# Patient Record
Sex: MALE | Race: WHITE | NOT HISPANIC OR LATINO | Employment: FULL TIME | URBAN - METROPOLITAN AREA
[De-identification: names, ages, dates, MRNs, and addresses within clinical notes are randomized per-mention and may not be internally consistent; named-entity substitution may affect disease eponyms.]

---

## 2017-04-04 ENCOUNTER — HOSPITAL ENCOUNTER (OUTPATIENT)
Dept: RADIOLOGY | Facility: MEDICAL CENTER | Age: 57
End: 2017-04-04
Attending: ORTHOPAEDIC SURGERY
Payer: COMMERCIAL

## 2017-04-04 ENCOUNTER — HOSPITAL ENCOUNTER (OUTPATIENT)
Dept: RADIOLOGY | Facility: MEDICAL CENTER | Age: 57
End: 2017-04-04

## 2017-04-04 DIAGNOSIS — M25.551 RIGHT HIP PAIN: ICD-10-CM

## 2017-04-04 DIAGNOSIS — Z96.641 PRESENCE OF RIGHT ARTIFICIAL HIP JOINT: ICD-10-CM

## 2017-04-04 PROCEDURE — A9503 TC99M MEDRONATE: HCPCS

## 2018-06-24 ENCOUNTER — OFFICE VISIT (OUTPATIENT)
Dept: URGENT CARE | Facility: PHYSICIAN GROUP | Age: 58
End: 2018-06-24
Payer: COMMERCIAL

## 2018-06-24 VITALS
SYSTOLIC BLOOD PRESSURE: 112 MMHG | RESPIRATION RATE: 16 BRPM | TEMPERATURE: 99.4 F | HEIGHT: 74 IN | OXYGEN SATURATION: 98 % | DIASTOLIC BLOOD PRESSURE: 74 MMHG | WEIGHT: 230 LBS | HEART RATE: 79 BPM | BODY MASS INDEX: 29.52 KG/M2

## 2018-06-24 DIAGNOSIS — B96.89 ACUTE BACTERIAL SINUSITIS: ICD-10-CM

## 2018-06-24 DIAGNOSIS — J20.9 ACUTE BRONCHITIS, UNSPECIFIED ORGANISM: ICD-10-CM

## 2018-06-24 DIAGNOSIS — J01.90 ACUTE BACTERIAL SINUSITIS: ICD-10-CM

## 2018-06-24 PROCEDURE — 99203 OFFICE O/P NEW LOW 30 MIN: CPT | Performed by: NURSE PRACTITIONER

## 2018-06-24 RX ORDER — DOXYCYCLINE HYCLATE 100 MG/1
100 CAPSULE ORAL 2 TIMES DAILY
Qty: 14 CAP | Refills: 0 | Status: SHIPPED | OUTPATIENT
Start: 2018-06-24 | End: 2018-07-01

## 2018-06-24 ASSESSMENT — ENCOUNTER SYMPTOMS
SPUTUM PRODUCTION: 1
CHILLS: 0
BACK PAIN: 0
HEMOPTYSIS: 0
SHORTNESS OF BREATH: 0
DIARRHEA: 0
COUGH: 1
MYALGIAS: 0
ORTHOPNEA: 0
NAUSEA: 0
FEVER: 0
WHEEZING: 0
EYE DISCHARGE: 0
SORE THROAT: 0
HEADACHES: 0

## 2018-06-24 NOTE — PROGRESS NOTES
Subjective:      Samir Godinez is a 58 y.o. male who presents with Cough (C/o cough, chest/nasal congestion, sore throat, intermittent body aches, labored breathing x2 wks.  Sore throat feels more esophageal pain)            HPI New problem. 58 year old male with cough and congestion for 2 weeks. Denies fever, chills, myalgia, headache. He denies chest pain or back pain, or hemoptysis. He does states chest feels tight with wheezing, worse at night. No history of asthma, never smoker. Taking OTC cough and cold for this.  Patient has no known allergies.  Current Outpatient Prescriptions on File Prior to Visit   Medication Sig Dispense Refill   • IBUPROFEN PO Take  by mouth.     • TRAMADOL HCL PO Take  by mouth.       No current facility-administered medications on file prior to visit.      Social History     Social History   • Marital status: Single     Spouse name: N/A   • Number of children: N/A   • Years of education: N/A     Occupational History   • Not on file.     Social History Main Topics   • Smoking status: Never Smoker   • Smokeless tobacco: Never Used   • Alcohol use No   • Drug use: No   • Sexual activity: Not on file     Other Topics Concern   • Not on file     Social History Narrative    ** Merged History Encounter **          family history is not on file.      Review of Systems   Constitutional: Positive for malaise/fatigue. Negative for chills and fever.   HENT: Positive for congestion. Negative for sore throat.    Eyes: Negative for discharge.   Respiratory: Positive for cough and sputum production. Negative for hemoptysis, shortness of breath and wheezing.    Cardiovascular: Negative for chest pain and orthopnea.   Gastrointestinal: Negative for diarrhea and nausea.   Musculoskeletal: Negative for back pain and myalgias.   Neurological: Negative for headaches.   Endo/Heme/Allergies: Negative for environmental allergies.          Objective:     /74   Pulse 79   Temp 37.4 °C (99.4 °F)    "Resp 16   Ht 1.88 m (6' 2\")   Wt 104.3 kg (230 lb)   SpO2 98%   BMI 29.53 kg/m²      Physical Exam   Constitutional: He is oriented to person, place, and time. He appears well-developed and well-nourished. No distress.   HENT:   Head: Normocephalic and atraumatic.   Right Ear: External ear and ear canal normal. Tympanic membrane is not injected and not perforated. No middle ear effusion.   Left Ear: External ear and ear canal normal. Tympanic membrane is not injected and not perforated.  No middle ear effusion.   Nose: Mucosal edema present.   Mouth/Throat: Posterior oropharyngeal erythema present. No oropharyngeal exudate.   Eyes: Conjunctivae are normal. Right eye exhibits no discharge. Left eye exhibits no discharge.   Neck: Normal range of motion. Neck supple.   Cardiovascular: Normal rate, regular rhythm and normal heart sounds.    No murmur heard.  Pulmonary/Chest: Effort normal and breath sounds normal. No respiratory distress. He has no wheezes.   Musculoskeletal: Normal range of motion.   Normal movement of all 4 extremities.   Lymphadenopathy:     He has no cervical adenopathy.        Right: No supraclavicular adenopathy present.        Left: No supraclavicular adenopathy present.   Neurological: He is alert and oriented to person, place, and time. Gait normal.   Skin: Skin is warm and dry.   Psychiatric: He has a normal mood and affect. His behavior is normal. Thought content normal.   Nursing note and vitals reviewed.              Assessment/Plan:     1. Acute bacterial sinusitis  doxycycline (VIBRAMYCIN) 100 MG Cap   2. Acute bronchitis, unspecified organism       Doxy.  Differential diagnosis, natural history, supportive care, and indications for immediate follow-up discussed at length.       "

## 2018-09-17 ENCOUNTER — APPOINTMENT (OUTPATIENT)
Dept: LAB | Facility: MEDICAL CENTER | Age: 58
End: 2018-09-17
Attending: FAMILY MEDICINE
Payer: COMMERCIAL

## 2018-10-23 ENCOUNTER — HOSPITAL ENCOUNTER (OUTPATIENT)
Dept: LAB | Facility: MEDICAL CENTER | Age: 58
End: 2018-10-23
Attending: ORTHOPAEDIC SURGERY
Payer: COMMERCIAL

## 2018-10-23 ENCOUNTER — HOSPITAL ENCOUNTER (OUTPATIENT)
Dept: LAB | Facility: MEDICAL CENTER | Age: 58
End: 2018-10-23
Attending: FAMILY MEDICINE
Payer: COMMERCIAL

## 2018-10-23 LAB
ALBUMIN SERPL BCP-MCNC: 3.9 G/DL (ref 3.2–4.9)
ALBUMIN/GLOB SERPL: 1.4 G/DL
ALP SERPL-CCNC: 78 U/L (ref 30–99)
ALT SERPL-CCNC: 34 U/L (ref 2–50)
ANION GAP SERPL CALC-SCNC: 7 MMOL/L (ref 0–11.9)
AST SERPL-CCNC: 34 U/L (ref 12–45)
BILIRUB SERPL-MCNC: 0.6 MG/DL (ref 0.1–1.5)
BUN SERPL-MCNC: 21 MG/DL (ref 8–22)
CALCIUM SERPL-MCNC: 9.7 MG/DL (ref 8.5–10.5)
CHLORIDE SERPL-SCNC: 103 MMOL/L (ref 96–112)
CHOLEST SERPL-MCNC: 184 MG/DL (ref 100–199)
CO2 SERPL-SCNC: 27 MMOL/L (ref 20–33)
CREAT SERPL-MCNC: 0.97 MG/DL (ref 0.5–1.4)
CRP SERPL HS-MCNC: 0.62 MG/DL (ref 0–0.75)
ERYTHROCYTE [DISTWIDTH] IN BLOOD BY AUTOMATED COUNT: 41.7 FL (ref 35.9–50)
ERYTHROCYTE [SEDIMENTATION RATE] IN BLOOD BY WESTERGREN METHOD: 16 MM/HOUR (ref 0–20)
FASTING STATUS PATIENT QL REPORTED: NORMAL
GLOBULIN SER CALC-MCNC: 2.8 G/DL (ref 1.9–3.5)
GLUCOSE SERPL-MCNC: 98 MG/DL (ref 65–99)
HCT VFR BLD AUTO: 43.3 % (ref 42–52)
HDLC SERPL-MCNC: 56 MG/DL
HGB BLD-MCNC: 14.5 G/DL (ref 14–18)
LDLC SERPL CALC-MCNC: 116 MG/DL
MCH RBC QN AUTO: 29.6 PG (ref 27–33)
MCHC RBC AUTO-ENTMCNC: 33.5 G/DL (ref 33.7–35.3)
MCV RBC AUTO: 88.4 FL (ref 81.4–97.8)
PLATELET # BLD AUTO: 234 K/UL (ref 164–446)
PMV BLD AUTO: 9.3 FL (ref 9–12.9)
POTASSIUM SERPL-SCNC: 4.1 MMOL/L (ref 3.6–5.5)
PROT SERPL-MCNC: 6.7 G/DL (ref 6–8.2)
PSA SERPL-MCNC: 1.75 NG/ML (ref 0–4)
RBC # BLD AUTO: 4.9 M/UL (ref 4.7–6.1)
SODIUM SERPL-SCNC: 137 MMOL/L (ref 135–145)
TRIGL SERPL-MCNC: 60 MG/DL (ref 0–149)
WBC # BLD AUTO: 6.1 K/UL (ref 4.8–10.8)

## 2018-10-23 PROCEDURE — 80061 LIPID PANEL: CPT

## 2018-10-23 PROCEDURE — 85652 RBC SED RATE AUTOMATED: CPT

## 2018-10-23 PROCEDURE — 85027 COMPLETE CBC AUTOMATED: CPT

## 2018-10-23 PROCEDURE — 36415 COLL VENOUS BLD VENIPUNCTURE: CPT

## 2018-10-23 PROCEDURE — 86140 C-REACTIVE PROTEIN: CPT

## 2018-10-23 PROCEDURE — 84153 ASSAY OF PSA TOTAL: CPT

## 2018-10-23 PROCEDURE — 80053 COMPREHEN METABOLIC PANEL: CPT

## 2018-12-19 ENCOUNTER — HOSPITAL ENCOUNTER (OUTPATIENT)
Dept: LAB | Facility: MEDICAL CENTER | Age: 58
End: 2018-12-19
Attending: PHYSICIAN ASSISTANT
Payer: COMMERCIAL

## 2018-12-19 LAB
ALBUMIN SERPL BCP-MCNC: 4.1 G/DL (ref 3.2–4.9)
ALBUMIN/GLOB SERPL: 1.6 G/DL
ALP SERPL-CCNC: 68 U/L (ref 30–99)
ALT SERPL-CCNC: 73 U/L (ref 2–50)
ANION GAP SERPL CALC-SCNC: 7 MMOL/L (ref 0–11.9)
AST SERPL-CCNC: 50 U/L (ref 12–45)
BILIRUB SERPL-MCNC: 0.6 MG/DL (ref 0.1–1.5)
BUN SERPL-MCNC: 15 MG/DL (ref 8–22)
CALCIUM SERPL-MCNC: 9.7 MG/DL (ref 8.5–10.5)
CHLORIDE SERPL-SCNC: 104 MMOL/L (ref 96–112)
CO2 SERPL-SCNC: 25 MMOL/L (ref 20–33)
CREAT SERPL-MCNC: 0.96 MG/DL (ref 0.5–1.4)
FASTING STATUS PATIENT QL REPORTED: NORMAL
GLOBULIN SER CALC-MCNC: 2.6 G/DL (ref 1.9–3.5)
GLUCOSE SERPL-MCNC: 136 MG/DL (ref 65–99)
POTASSIUM SERPL-SCNC: 3.7 MMOL/L (ref 3.6–5.5)
PROT SERPL-MCNC: 6.7 G/DL (ref 6–8.2)
SODIUM SERPL-SCNC: 136 MMOL/L (ref 135–145)

## 2018-12-19 PROCEDURE — 80053 COMPREHEN METABOLIC PANEL: CPT

## 2018-12-19 PROCEDURE — 36415 COLL VENOUS BLD VENIPUNCTURE: CPT

## 2019-09-12 DIAGNOSIS — Z01.812 PRE-OPERATIVE LABORATORY EXAMINATION: ICD-10-CM

## 2019-09-12 LAB
ANION GAP SERPL CALC-SCNC: 8 MMOL/L (ref 0–11.9)
BUN SERPL-MCNC: 17 MG/DL (ref 8–22)
CALCIUM SERPL-MCNC: 9.5 MG/DL (ref 8.5–10.5)
CHLORIDE SERPL-SCNC: 104 MMOL/L (ref 96–112)
CO2 SERPL-SCNC: 26 MMOL/L (ref 20–33)
CREAT SERPL-MCNC: 0.95 MG/DL (ref 0.5–1.4)
ERYTHROCYTE [DISTWIDTH] IN BLOOD BY AUTOMATED COUNT: 42.4 FL (ref 35.9–50)
GLUCOSE SERPL-MCNC: 104 MG/DL (ref 65–99)
HCT VFR BLD AUTO: 43.3 % (ref 42–52)
HGB BLD-MCNC: 14.5 G/DL (ref 14–18)
MCH RBC QN AUTO: 30 PG (ref 27–33)
MCHC RBC AUTO-ENTMCNC: 33.5 G/DL (ref 33.7–35.3)
MCV RBC AUTO: 89.5 FL (ref 81.4–97.8)
PLATELET # BLD AUTO: 194 K/UL (ref 164–446)
PMV BLD AUTO: 9.6 FL (ref 9–12.9)
POTASSIUM SERPL-SCNC: 4.1 MMOL/L (ref 3.6–5.5)
RBC # BLD AUTO: 4.84 M/UL (ref 4.7–6.1)
SCCMEC + MECA PNL NOSE NAA+PROBE: NEGATIVE
SCCMEC + MECA PNL NOSE NAA+PROBE: NEGATIVE
SODIUM SERPL-SCNC: 138 MMOL/L (ref 135–145)
WBC # BLD AUTO: 6.5 K/UL (ref 4.8–10.8)

## 2019-09-12 PROCEDURE — 85027 COMPLETE CBC AUTOMATED: CPT

## 2019-09-12 PROCEDURE — 80048 BASIC METABOLIC PNL TOTAL CA: CPT

## 2019-09-12 PROCEDURE — 87641 MR-STAPH DNA AMP PROBE: CPT

## 2019-09-12 PROCEDURE — 36415 COLL VENOUS BLD VENIPUNCTURE: CPT

## 2019-09-12 PROCEDURE — 87640 STAPH A DNA AMP PROBE: CPT

## 2019-09-12 RX ORDER — ALPRAZOLAM 0.5 MG/1
0.5 TABLET ORAL NIGHTLY PRN
Status: ON HOLD | COMMUNITY
End: 2019-09-25

## 2019-09-25 ENCOUNTER — APPOINTMENT (OUTPATIENT)
Dept: RADIOLOGY | Facility: MEDICAL CENTER | Age: 59
DRG: 483 | End: 2019-09-25
Attending: SURGERY
Payer: COMMERCIAL

## 2019-09-25 ENCOUNTER — ANESTHESIA EVENT (OUTPATIENT)
Dept: SURGERY | Facility: MEDICAL CENTER | Age: 59
DRG: 483 | End: 2019-09-25
Payer: COMMERCIAL

## 2019-09-25 ENCOUNTER — ANESTHESIA (OUTPATIENT)
Dept: SURGERY | Facility: MEDICAL CENTER | Age: 59
DRG: 483 | End: 2019-09-25
Payer: COMMERCIAL

## 2019-09-25 ENCOUNTER — HOSPITAL ENCOUNTER (INPATIENT)
Facility: MEDICAL CENTER | Age: 59
LOS: 2 days | DRG: 483 | End: 2019-09-27
Attending: SURGERY | Admitting: SURGERY
Payer: COMMERCIAL

## 2019-09-25 DIAGNOSIS — G89.18 ACUTE POST-OPERATIVE PAIN: ICD-10-CM

## 2019-09-25 DIAGNOSIS — Z96.612 STATUS POST TOTAL SHOULDER REPLACEMENT, LEFT: ICD-10-CM

## 2019-09-25 PROCEDURE — 160036 HCHG PACU - EA ADDL 30 MINS PHASE I: Performed by: SURGERY

## 2019-09-25 PROCEDURE — 160022 HCHG BLOCK: Performed by: SURGERY

## 2019-09-25 PROCEDURE — 160002 HCHG RECOVERY MINUTES (STAT): Performed by: SURGERY

## 2019-09-25 PROCEDURE — 700111 HCHG RX REV CODE 636 W/ 250 OVERRIDE (IP)

## 2019-09-25 PROCEDURE — 502578 HCHG PACK, TOTAL HIP: Performed by: SURGERY

## 2019-09-25 PROCEDURE — A9270 NON-COVERED ITEM OR SERVICE: HCPCS | Performed by: ANESTHESIOLOGY

## 2019-09-25 PROCEDURE — 501838 HCHG SUTURE GENERAL: Performed by: SURGERY

## 2019-09-25 PROCEDURE — 73020 X-RAY EXAM OF SHOULDER: CPT | Mod: LT

## 2019-09-25 PROCEDURE — 700101 HCHG RX REV CODE 250: Performed by: ANESTHESIOLOGY

## 2019-09-25 PROCEDURE — A6223 GAUZE >16<=48 NO W/SAL W/O B: HCPCS | Performed by: SURGERY

## 2019-09-25 PROCEDURE — A4565 SLINGS: HCPCS | Performed by: SURGERY

## 2019-09-25 PROCEDURE — 700102 HCHG RX REV CODE 250 W/ 637 OVERRIDE(OP): Performed by: ANESTHESIOLOGY

## 2019-09-25 PROCEDURE — 700111 HCHG RX REV CODE 636 W/ 250 OVERRIDE (IP): Performed by: ANESTHESIOLOGY

## 2019-09-25 PROCEDURE — 700111 HCHG RX REV CODE 636 W/ 250 OVERRIDE (IP): Performed by: SURGERY

## 2019-09-25 PROCEDURE — 700102 HCHG RX REV CODE 250 W/ 637 OVERRIDE(OP): Performed by: SURGERY

## 2019-09-25 PROCEDURE — 160009 HCHG ANES TIME/MIN: Performed by: SURGERY

## 2019-09-25 PROCEDURE — 160041 HCHG SURGERY MINUTES - EA ADDL 1 MIN LEVEL 4: Performed by: SURGERY

## 2019-09-25 PROCEDURE — A9270 NON-COVERED ITEM OR SERVICE: HCPCS | Performed by: SURGERY

## 2019-09-25 PROCEDURE — 160029 HCHG SURGERY MINUTES - 1ST 30 MINS LEVEL 4: Performed by: SURGERY

## 2019-09-25 PROCEDURE — 160048 HCHG OR STATISTICAL LEVEL 1-5: Performed by: SURGERY

## 2019-09-25 PROCEDURE — 700105 HCHG RX REV CODE 258: Performed by: SURGERY

## 2019-09-25 PROCEDURE — 3E0T3BZ INTRODUCTION OF ANESTHETIC AGENT INTO PERIPHERAL NERVES AND PLEXI, PERCUTANEOUS APPROACH: ICD-10-PCS | Performed by: ANESTHESIOLOGY

## 2019-09-25 PROCEDURE — 0LS40ZZ REPOSITION LEFT UPPER ARM TENDON, OPEN APPROACH: ICD-10-PCS | Performed by: SURGERY

## 2019-09-25 PROCEDURE — 500367 HCHG DRAIN KIT, HEMOVAC: Performed by: SURGERY

## 2019-09-25 PROCEDURE — 160035 HCHG PACU - 1ST 60 MINS PHASE I: Performed by: SURGERY

## 2019-09-25 PROCEDURE — 0RRK0JZ REPLACEMENT OF LEFT SHOULDER JOINT WITH SYNTHETIC SUBSTITUTE, OPEN APPROACH: ICD-10-PCS | Performed by: SURGERY

## 2019-09-25 PROCEDURE — L8699 PROSTHETIC IMPLANT NOS: HCPCS | Performed by: SURGERY

## 2019-09-25 PROCEDURE — 770001 HCHG ROOM/CARE - MED/SURG/GYN PRIV*

## 2019-09-25 PROCEDURE — 502000 HCHG MISC OR IMPLANTS RC 0278: Performed by: SURGERY

## 2019-09-25 PROCEDURE — 700105 HCHG RX REV CODE 258

## 2019-09-25 DEVICE — IMPLANTABLE DEVICE: Type: IMPLANTABLE DEVICE | Site: SHOULDER | Status: FUNCTIONAL

## 2019-09-25 DEVICE — BONE CEMENT SIMPLEX FULL DOSE - (10EA/PK): Type: IMPLANTABLE DEVICE | Site: SHOULDER | Status: FUNCTIONAL

## 2019-09-25 RX ORDER — VANCOMYCIN HYDROCHLORIDE 1 G/20ML
INJECTION, POWDER, LYOPHILIZED, FOR SOLUTION INTRAVENOUS
Status: COMPLETED | OUTPATIENT
Start: 2019-09-25 | End: 2019-09-25

## 2019-09-25 RX ORDER — SODIUM CHLORIDE 9 MG/ML
INJECTION, SOLUTION INTRAVENOUS
Status: COMPLETED
Start: 2019-09-25 | End: 2019-09-25

## 2019-09-25 RX ORDER — ENEMA 19; 7 G/133ML; G/133ML
1 ENEMA RECTAL
Status: DISCONTINUED | OUTPATIENT
Start: 2019-09-25 | End: 2019-09-27 | Stop reason: HOSPADM

## 2019-09-25 RX ORDER — HYDROMORPHONE HYDROCHLORIDE 1 MG/ML
0.2 INJECTION, SOLUTION INTRAMUSCULAR; INTRAVENOUS; SUBCUTANEOUS
Status: DISCONTINUED | OUTPATIENT
Start: 2019-09-25 | End: 2019-09-25 | Stop reason: HOSPADM

## 2019-09-25 RX ORDER — ONDANSETRON 2 MG/ML
4 INJECTION INTRAMUSCULAR; INTRAVENOUS
Status: DISCONTINUED | OUTPATIENT
Start: 2019-09-25 | End: 2019-09-25 | Stop reason: HOSPADM

## 2019-09-25 RX ORDER — ONDANSETRON 2 MG/ML
4 INJECTION INTRAMUSCULAR; INTRAVENOUS EVERY 4 HOURS PRN
Status: DISCONTINUED | OUTPATIENT
Start: 2019-09-25 | End: 2019-09-27 | Stop reason: HOSPADM

## 2019-09-25 RX ORDER — OXYCODONE HYDROCHLORIDE 10 MG/1
10 TABLET ORAL
Status: DISCONTINUED | OUTPATIENT
Start: 2019-09-25 | End: 2019-09-26

## 2019-09-25 RX ORDER — HYDROMORPHONE HYDROCHLORIDE 1 MG/ML
0.1 INJECTION, SOLUTION INTRAMUSCULAR; INTRAVENOUS; SUBCUTANEOUS
Status: DISCONTINUED | OUTPATIENT
Start: 2019-09-25 | End: 2019-09-25 | Stop reason: HOSPADM

## 2019-09-25 RX ORDER — MIDAZOLAM HYDROCHLORIDE 1 MG/ML
0.5 INJECTION INTRAMUSCULAR; INTRAVENOUS
Status: DISCONTINUED | OUTPATIENT
Start: 2019-09-25 | End: 2019-09-25 | Stop reason: HOSPADM

## 2019-09-25 RX ORDER — HYDROMORPHONE HYDROCHLORIDE 1 MG/ML
0.4 INJECTION, SOLUTION INTRAMUSCULAR; INTRAVENOUS; SUBCUTANEOUS
Status: DISCONTINUED | OUTPATIENT
Start: 2019-09-25 | End: 2019-09-25 | Stop reason: HOSPADM

## 2019-09-25 RX ORDER — CELECOXIB 200 MG/1
200 CAPSULE ORAL 2 TIMES DAILY
Status: DISCONTINUED | OUTPATIENT
Start: 2019-09-25 | End: 2019-09-27 | Stop reason: HOSPADM

## 2019-09-25 RX ORDER — ONDANSETRON 2 MG/ML
INJECTION INTRAMUSCULAR; INTRAVENOUS PRN
Status: DISCONTINUED | OUTPATIENT
Start: 2019-09-25 | End: 2019-09-25 | Stop reason: SURG

## 2019-09-25 RX ORDER — DOCUSATE SODIUM 100 MG/1
100 CAPSULE, LIQUID FILLED ORAL 2 TIMES DAILY
Status: DISCONTINUED | OUTPATIENT
Start: 2019-09-25 | End: 2019-09-27 | Stop reason: HOSPADM

## 2019-09-25 RX ORDER — SCOLOPAMINE TRANSDERMAL SYSTEM 1 MG/1
1 PATCH, EXTENDED RELEASE TRANSDERMAL
Status: DISCONTINUED | OUTPATIENT
Start: 2019-09-25 | End: 2019-09-27 | Stop reason: HOSPADM

## 2019-09-25 RX ORDER — AMOXICILLIN 250 MG
1 CAPSULE ORAL
Status: DISCONTINUED | OUTPATIENT
Start: 2019-09-25 | End: 2019-09-27 | Stop reason: HOSPADM

## 2019-09-25 RX ORDER — SODIUM CHLORIDE, SODIUM LACTATE, POTASSIUM CHLORIDE, CALCIUM CHLORIDE 600; 310; 30; 20 MG/100ML; MG/100ML; MG/100ML; MG/100ML
INJECTION, SOLUTION INTRAVENOUS CONTINUOUS
Status: DISCONTINUED | OUTPATIENT
Start: 2019-09-25 | End: 2019-09-25 | Stop reason: HOSPADM

## 2019-09-25 RX ORDER — CEFAZOLIN SODIUM 1 G/3ML
INJECTION, POWDER, FOR SOLUTION INTRAMUSCULAR; INTRAVENOUS PRN
Status: DISCONTINUED | OUTPATIENT
Start: 2019-09-25 | End: 2019-09-25 | Stop reason: SURG

## 2019-09-25 RX ORDER — SODIUM CHLORIDE, SODIUM LACTATE, POTASSIUM CHLORIDE, CALCIUM CHLORIDE 600; 310; 30; 20 MG/100ML; MG/100ML; MG/100ML; MG/100ML
INJECTION, SOLUTION INTRAVENOUS CONTINUOUS
Status: ACTIVE | OUTPATIENT
Start: 2019-09-25 | End: 2019-09-26

## 2019-09-25 RX ORDER — OXYCODONE HYDROCHLORIDE 5 MG/1
5 TABLET ORAL
Status: DISCONTINUED | OUTPATIENT
Start: 2019-09-25 | End: 2019-09-26

## 2019-09-25 RX ORDER — DEXAMETHASONE SODIUM PHOSPHATE 4 MG/ML
4 INJECTION, SOLUTION INTRA-ARTICULAR; INTRALESIONAL; INTRAMUSCULAR; INTRAVENOUS; SOFT TISSUE
Status: DISCONTINUED | OUTPATIENT
Start: 2019-09-25 | End: 2019-09-27 | Stop reason: HOSPADM

## 2019-09-25 RX ORDER — ACETAMINOPHEN 500 MG
1000 TABLET ORAL EVERY 6 HOURS
Status: DISCONTINUED | OUTPATIENT
Start: 2019-09-25 | End: 2019-09-27 | Stop reason: HOSPADM

## 2019-09-25 RX ORDER — POLYETHYLENE GLYCOL 3350 17 G/17G
1 POWDER, FOR SOLUTION ORAL 2 TIMES DAILY PRN
Status: DISCONTINUED | OUTPATIENT
Start: 2019-09-25 | End: 2019-09-27 | Stop reason: HOSPADM

## 2019-09-25 RX ORDER — DIPHENHYDRAMINE HYDROCHLORIDE 50 MG/ML
25 INJECTION INTRAMUSCULAR; INTRAVENOUS EVERY 6 HOURS PRN
Status: DISCONTINUED | OUTPATIENT
Start: 2019-09-25 | End: 2019-09-27 | Stop reason: HOSPADM

## 2019-09-25 RX ORDER — ROPIVACAINE HYDROCHLORIDE 5 MG/ML
INJECTION, SOLUTION EPIDURAL; INFILTRATION; PERINEURAL PRN
Status: DISCONTINUED | OUTPATIENT
Start: 2019-09-25 | End: 2019-09-25 | Stop reason: SURG

## 2019-09-25 RX ORDER — AMOXICILLIN 250 MG
1 CAPSULE ORAL NIGHTLY
Status: DISCONTINUED | OUTPATIENT
Start: 2019-09-25 | End: 2019-09-27 | Stop reason: HOSPADM

## 2019-09-25 RX ORDER — CEFAZOLIN SODIUM 2 G/100ML
2 INJECTION, SOLUTION INTRAVENOUS EVERY 8 HOURS
Status: COMPLETED | OUTPATIENT
Start: 2019-09-25 | End: 2019-09-26

## 2019-09-25 RX ORDER — TRANEXAMIC ACID 100 MG/ML
INJECTION, SOLUTION INTRAVENOUS PRN
Status: DISCONTINUED | OUTPATIENT
Start: 2019-09-25 | End: 2019-09-25 | Stop reason: SURG

## 2019-09-25 RX ORDER — BISACODYL 10 MG
10 SUPPOSITORY, RECTAL RECTAL
Status: DISCONTINUED | OUTPATIENT
Start: 2019-09-25 | End: 2019-09-27 | Stop reason: HOSPADM

## 2019-09-25 RX ORDER — MEPERIDINE HYDROCHLORIDE 25 MG/ML
25 INJECTION INTRAMUSCULAR; INTRAVENOUS; SUBCUTANEOUS
Status: DISCONTINUED | OUTPATIENT
Start: 2019-09-25 | End: 2019-09-25 | Stop reason: HOSPADM

## 2019-09-25 RX ORDER — OXYCODONE HCL 5 MG/5 ML
5 SOLUTION, ORAL ORAL
Status: COMPLETED | OUTPATIENT
Start: 2019-09-25 | End: 2019-09-25

## 2019-09-25 RX ORDER — DEXAMETHASONE SODIUM PHOSPHATE 4 MG/ML
INJECTION, SOLUTION INTRA-ARTICULAR; INTRALESIONAL; INTRAMUSCULAR; INTRAVENOUS; SOFT TISSUE PRN
Status: DISCONTINUED | OUTPATIENT
Start: 2019-09-25 | End: 2019-09-25 | Stop reason: SURG

## 2019-09-25 RX ORDER — OXYCODONE HCL 5 MG/5 ML
10 SOLUTION, ORAL ORAL
Status: COMPLETED | OUTPATIENT
Start: 2019-09-25 | End: 2019-09-25

## 2019-09-25 RX ORDER — LIDOCAINE HYDROCHLORIDE 10 MG/ML
INJECTION, SOLUTION EPIDURAL; INFILTRATION; INTRACAUDAL; PERINEURAL
Status: COMPLETED
Start: 2019-09-25 | End: 2019-09-25

## 2019-09-25 RX ORDER — HALOPERIDOL 5 MG/ML
1 INJECTION INTRAMUSCULAR
Status: DISCONTINUED | OUTPATIENT
Start: 2019-09-25 | End: 2019-09-25 | Stop reason: HOSPADM

## 2019-09-25 RX ORDER — HALOPERIDOL 5 MG/ML
1 INJECTION INTRAMUSCULAR EVERY 6 HOURS PRN
Status: DISCONTINUED | OUTPATIENT
Start: 2019-09-25 | End: 2019-09-27 | Stop reason: HOSPADM

## 2019-09-25 RX ORDER — LIDOCAINE HYDROCHLORIDE 40 MG/ML
SOLUTION TOPICAL PRN
Status: DISCONTINUED | OUTPATIENT
Start: 2019-09-25 | End: 2019-09-25 | Stop reason: SURG

## 2019-09-25 RX ADMIN — FENTANYL CITRATE 100 MCG: 50 INJECTION, SOLUTION INTRAMUSCULAR; INTRAVENOUS at 15:06

## 2019-09-25 RX ADMIN — VANCOMYCIN HYDROCHLORIDE 2 G: 1 INJECTION, POWDER, LYOPHILIZED, FOR SOLUTION INTRAVENOUS at 15:20

## 2019-09-25 RX ADMIN — SODIUM CHLORIDE: 9 INJECTION, SOLUTION INTRAVENOUS at 23:30

## 2019-09-25 RX ADMIN — SENNOSIDES, DOCUSATE SODIUM 1 TABLET: 50; 8.6 TABLET, FILM COATED ORAL at 21:35

## 2019-09-25 RX ADMIN — CEFAZOLIN SODIUM 2 G: 2 INJECTION, SOLUTION INTRAVENOUS at 23:32

## 2019-09-25 RX ADMIN — MIDAZOLAM HYDROCHLORIDE 1 MG: 1 INJECTION, SOLUTION INTRAMUSCULAR; INTRAVENOUS at 14:55

## 2019-09-25 RX ADMIN — SUCCINYLCHOLINE CHLORIDE 100 MG: 20 INJECTION, SOLUTION INTRAMUSCULAR; INTRAVENOUS at 15:08

## 2019-09-25 RX ADMIN — DEXAMETHASONE SODIUM PHOSPHATE 8 MG: 4 INJECTION, SOLUTION INTRA-ARTICULAR; INTRALESIONAL; INTRAMUSCULAR; INTRAVENOUS; SOFT TISSUE at 15:20

## 2019-09-25 RX ADMIN — OXYCODONE HYDROCHLORIDE 10 MG: 5 SOLUTION ORAL at 17:30

## 2019-09-25 RX ADMIN — EPHEDRINE SULFATE 50 MG: 50 INJECTION INTRAMUSCULAR; INTRAVENOUS; SUBCUTANEOUS at 15:30

## 2019-09-25 RX ADMIN — EPHEDRINE SULFATE 50 MG: 50 INJECTION INTRAMUSCULAR; INTRAVENOUS; SUBCUTANEOUS at 16:29

## 2019-09-25 RX ADMIN — ONDANSETRON 4 MG: 2 INJECTION INTRAMUSCULAR; INTRAVENOUS at 15:20

## 2019-09-25 RX ADMIN — LIDOCAINE HYDROCHLORIDE 0.5 ML: 10 INJECTION, SOLUTION EPIDURAL; INFILTRATION; INTRACAUDAL at 13:10

## 2019-09-25 RX ADMIN — FENTANYL CITRATE 50 MCG: 50 INJECTION INTRAMUSCULAR; INTRAVENOUS at 17:48

## 2019-09-25 RX ADMIN — SODIUM CHLORIDE, POTASSIUM CHLORIDE, SODIUM LACTATE AND CALCIUM CHLORIDE: 600; 310; 30; 20 INJECTION, SOLUTION INTRAVENOUS at 13:10

## 2019-09-25 RX ADMIN — OXYCODONE HYDROCHLORIDE 10 MG: 10 TABLET ORAL at 21:35

## 2019-09-25 RX ADMIN — Medication 0.5 ML: at 13:10

## 2019-09-25 RX ADMIN — TRANEXAMIC ACID 1000 MG: 100 INJECTION, SOLUTION INTRAVENOUS at 15:20

## 2019-09-25 RX ADMIN — LIDOCAINE HYDROCHLORIDE 4 ML: 40 SOLUTION TOPICAL at 15:09

## 2019-09-25 RX ADMIN — CELECOXIB 200 MG: 200 CAPSULE ORAL at 21:35

## 2019-09-25 RX ADMIN — FENTANYL CITRATE 50 MCG: 50 INJECTION, SOLUTION INTRAMUSCULAR; INTRAVENOUS at 14:55

## 2019-09-25 RX ADMIN — ROCURONIUM BROMIDE 10 MG: 10 INJECTION, SOLUTION INTRAVENOUS at 15:08

## 2019-09-25 RX ADMIN — ACETAMINOPHEN 1000 MG: 500 TABLET ORAL at 23:35

## 2019-09-25 RX ADMIN — ROPIVACAINE HYDROCHLORIDE 30 ML: 5 INJECTION, SOLUTION EPIDURAL; INFILTRATION; PERINEURAL at 15:05

## 2019-09-25 RX ADMIN — LIDOCAINE HYDROCHLORIDE 50 MG: 20 INJECTION, SOLUTION INTRAVENOUS at 15:08

## 2019-09-25 RX ADMIN — FENTANYL CITRATE 100 MCG: 50 INJECTION, SOLUTION INTRAMUSCULAR; INTRAVENOUS at 17:13

## 2019-09-25 RX ADMIN — CEFAZOLIN 2 G: 330 INJECTION, POWDER, FOR SOLUTION INTRAMUSCULAR; INTRAVENOUS at 15:20

## 2019-09-25 RX ADMIN — PROPOFOL 200 MG: 10 INJECTION, EMULSION INTRAVENOUS at 15:08

## 2019-09-25 RX ADMIN — FENTANYL CITRATE 50 MCG: 50 INJECTION INTRAMUSCULAR; INTRAVENOUS at 17:30

## 2019-09-25 ASSESSMENT — PAIN SCALES - GENERAL: PAIN_LEVEL: 0

## 2019-09-25 NOTE — PROGRESS NOTES
Med Rec Updated and Complete per Pt at bedside  Allergies Reviewed  No PO ABX last 14 days.    Pt denies RX or OTC medication at this time.

## 2019-09-25 NOTE — ANESTHESIA PREPROCEDURE EVALUATION
Relevant Problems   Other   (+) Synovitis of hip       Physical Exam    Airway   Mallampati: II  TM distance: >3 FB  Neck ROM: full       Cardiovascular - normal exam  Rhythm: regular  Rate: normal  (-) murmur     Dental - normal exam         Pulmonary - normal exam  Breath sounds clear to auscultation     Abdominal   (+) obese     Neurological - normal exam                 Anesthesia Plan    ASA 2       Plan - general and peripheral nerve block     Peripheral nerve block will be post-op pain control  Airway plan will be ETT        Induction: intravenous    Postoperative Plan: Postoperative administration of opioids is intended.    Pertinent diagnostic labs and testing reviewed    Informed Consent:    Anesthetic plan and risks discussed with patient.    Use of blood products discussed with: patient whom consented to blood products.

## 2019-09-25 NOTE — ANESTHESIA PROCEDURE NOTES
Peripheral Block  Date/Time: 9/25/2019 3:00 PM  Performed by: Dillon Nance M.D.  Authorized by: Dillon Nance M.D.     Patient Location:  OR  Start Time:  9/25/2019 3:00 PM  End Time:  9/25/2019 3:05 PM  Reason for Block: at surgeon's request, post-op pain management and procedure for pain    patient identified, IV checked, site marked, risks and benefits discussed, surgical consent, monitors and equipment checked, pre-op evaluation and timeout performed    Patient Position:  Supine  Prep: ChloraPrep    Monitoring:  Heart rate, continuous pulse ox and cardiac monitor  Block Region:  Upper Extremity  Upper Extremity - Block Type:  BRACHIAL PLEXUS block, Interscalene approach    Laterality:  Left  Procedures: ultrasound guided  Image captured, interpreted and electronically stored.  Local Infiltration:  Lidocaine  Strength:  2 %  Dose:  2 ml  Block Type:  Single-shot  Needle Length:  100mm  Needle Gauge:  21 G  Needle Localization:  Ultrasound guidance and anatomical landmarks  Injection Assessment:  Negative aspiration for heme, no paresthesia on injection, incremental injection and local visualized surrounding nerve on ultrasound  Evidence of intravascular injection: No

## 2019-09-25 NOTE — ANESTHESIA PROCEDURE NOTES
Airway  Date/Time: 9/25/2019 3:09 PM  Performed by: Dillon Nance M.D.  Authorized by: Dillon Nance M.D.     Location:  OR  Urgency:  Elective  Difficult Airway: No    Indications for Airway Management:  Anesthesia  Spontaneous Ventilation: absent    Sedation Level:  Deep  Preoxygenated: Yes    Patient Position:  Sniffing  Mask Difficulty Assessment:  0 - not attempted  Final Airway Type:  Endotracheal airway  Final Endotracheal Airway:  ETT  Cuffed: Yes    Technique Used for Successful ETT Placement:  Direct laryngoscopy  Insertion Site:  Oral  Blade Type:  Umberto  Laryngoscope Blade/Videolaryngoscope Blade Size:  4  ETT Size (mm):  6.5  Measured from:  Teeth  ETT to Teeth (cm):  21  Placement Verified by: auscultation and capnometry    Cormack-Lehane Classification:  Grade IIa - partial view of glottis  Number of Attempts at Approach:  1  Number of Other Approaches Attempted:  0

## 2019-09-26 PROCEDURE — A9270 NON-COVERED ITEM OR SERVICE: HCPCS | Performed by: SURGERY

## 2019-09-26 PROCEDURE — 700102 HCHG RX REV CODE 250 W/ 637 OVERRIDE(OP): Performed by: PHYSICIAN ASSISTANT

## 2019-09-26 PROCEDURE — 770001 HCHG ROOM/CARE - MED/SURG/GYN PRIV*

## 2019-09-26 PROCEDURE — 700112 HCHG RX REV CODE 229: Performed by: SURGERY

## 2019-09-26 PROCEDURE — A9270 NON-COVERED ITEM OR SERVICE: HCPCS | Performed by: PHYSICIAN ASSISTANT

## 2019-09-26 PROCEDURE — 700111 HCHG RX REV CODE 636 W/ 250 OVERRIDE (IP): Performed by: SURGERY

## 2019-09-26 PROCEDURE — 700102 HCHG RX REV CODE 250 W/ 637 OVERRIDE(OP): Performed by: SURGERY

## 2019-09-26 RX ORDER — OXYCODONE HYDROCHLORIDE 5 MG/1
5-15 TABLET ORAL
Status: DISCONTINUED | OUTPATIENT
Start: 2019-09-26 | End: 2019-09-27 | Stop reason: HOSPADM

## 2019-09-26 RX ADMIN — SENNOSIDES, DOCUSATE SODIUM 1 TABLET: 50; 8.6 TABLET, FILM COATED ORAL at 20:07

## 2019-09-26 RX ADMIN — OXYCODONE HYDROCHLORIDE 15 MG: 5 TABLET ORAL at 20:07

## 2019-09-26 RX ADMIN — ACETAMINOPHEN 1000 MG: 500 TABLET ORAL at 06:00

## 2019-09-26 RX ADMIN — CELECOXIB 200 MG: 200 CAPSULE ORAL at 17:31

## 2019-09-26 RX ADMIN — OXYCODONE HYDROCHLORIDE 10 MG: 10 TABLET ORAL at 12:45

## 2019-09-26 RX ADMIN — CELECOXIB 200 MG: 200 CAPSULE ORAL at 06:00

## 2019-09-26 RX ADMIN — DOCUSATE SODIUM 100 MG: 100 CAPSULE, LIQUID FILLED ORAL at 17:31

## 2019-09-26 RX ADMIN — ACETAMINOPHEN 1000 MG: 500 TABLET ORAL at 23:34

## 2019-09-26 RX ADMIN — ACETAMINOPHEN 1000 MG: 500 TABLET ORAL at 17:31

## 2019-09-26 RX ADMIN — CEFAZOLIN SODIUM 2 G: 2 INJECTION, SOLUTION INTRAVENOUS at 06:25

## 2019-09-26 RX ADMIN — OXYCODONE HYDROCHLORIDE 15 MG: 5 TABLET ORAL at 23:34

## 2019-09-26 RX ADMIN — ACETAMINOPHEN 1000 MG: 500 TABLET ORAL at 12:45

## 2019-09-26 RX ADMIN — OXYCODONE HYDROCHLORIDE 15 MG: 5 TABLET ORAL at 16:14

## 2019-09-26 RX ADMIN — OXYCODONE HYDROCHLORIDE 10 MG: 10 TABLET ORAL at 08:47

## 2019-09-26 ASSESSMENT — COGNITIVE AND FUNCTIONAL STATUS - GENERAL
TOILETING: A LOT
MOVING FROM LYING ON BACK TO SITTING ON SIDE OF FLAT BED: A LITTLE
WALKING IN HOSPITAL ROOM: A LITTLE
SUGGESTED CMS G CODE MODIFIER MOBILITY: CK
CLIMB 3 TO 5 STEPS WITH RAILING: A LITTLE
DAILY ACTIVITIY SCORE: 15
STANDING UP FROM CHAIR USING ARMS: A LITTLE
DRESSING REGULAR UPPER BODY CLOTHING: A LOT
MOVING TO AND FROM BED TO CHAIR: A LOT
TURNING FROM BACK TO SIDE WHILE IN FLAT BAD: A LOT
DRESSING REGULAR LOWER BODY CLOTHING: TOTAL
SUGGESTED CMS G CODE MODIFIER DAILY ACTIVITY: CK
MOBILITY SCORE: 16
HELP NEEDED FOR BATHING: A LOT

## 2019-09-26 ASSESSMENT — LIFESTYLE VARIABLES
CONSUMPTION TOTAL: NEGATIVE
ON A TYPICAL DAY WHEN YOU DRINK ALCOHOL HOW MANY DRINKS DO YOU HAVE: 0
TOTAL SCORE: 0
ALCOHOL_USE: NO
HOW MANY TIMES IN THE PAST YEAR HAVE YOU HAD 5 OR MORE DRINKS IN A DAY: 0
EVER HAD A DRINK FIRST THING IN THE MORNING TO STEADY YOUR NERVES TO GET RID OF A HANGOVER: NO
DOES PATIENT WANT TO STOP DRINKING: NO
HAVE YOU EVER FELT YOU SHOULD CUT DOWN ON YOUR DRINKING: NO
TOTAL SCORE: 0
EVER_SMOKED: NEVER
HAVE PEOPLE ANNOYED YOU BY CRITICIZING YOUR DRINKING: NO
TOTAL SCORE: 0
AVERAGE NUMBER OF DAYS PER WEEK YOU HAVE A DRINK CONTAINING ALCOHOL: 0
EVER FELT BAD OR GUILTY ABOUT YOUR DRINKING: NO

## 2019-09-26 ASSESSMENT — PATIENT HEALTH QUESTIONNAIRE - PHQ9
2. FEELING DOWN, DEPRESSED, IRRITABLE, OR HOPELESS: NOT AT ALL
1. LITTLE INTEREST OR PLEASURE IN DOING THINGS: NOT AT ALL
SUM OF ALL RESPONSES TO PHQ9 QUESTIONS 1 AND 2: 0

## 2019-09-26 NOTE — OP REPORT
DATE OF SERVICE:  09/25/2019    SURGEON:  Denzel Veliz MD    ASSISTANT:  Sean Reed PA-C    PREOPERATIVE DIAGNOSIS:  Left shoulder osteoarthritis.    POSTOPERATIVE DIAGNOSES:  1.  Left shoulder osteoarthritis.  2.  Left biceps tendinopathy.    PROCEDURES:  1.  Left anatomic total shoulder arthroplasty.  2.  Left open biceps tenodesis.    ANESTHESIOLOGIST:  Dillon Nance MD    ANESTHESIA:  General with upper extremity nerve block for pain control.    IMPLANTS:  Integra total shoulder arthroplasty system, size 12 pressfit stem,   standard body size 50x19 mm, eccentric head and a medium cemented   polyethylene.    COMPLICATIONS:  None noted.    DRAINS:  Hemovac x1.    SPECIMENS:  None.    ESTIMATED BLOOD LOSS:  150 mL.    INDICATIONS:  The patient is a 59-year-old gentleman well known to me through   my outpatient clinic for the above-mentioned diagnosis.  He believes and   agrees he has failed conservative treatment and is a candidate for the   above-mentioned procedure.  Prior to the procedure, he understood the risks,   benefits and alternatives to surgery.  He understood the risks to include, but   not limited to the risk of infection requiring repeat surgery, bleeding   requiring blood transfusion, nerve, blood vessel, tendon injury requiring   repair, chronic pain, periprosthetic fracture, subsidence, loosening or   dislocation, stiffness or dissatisfaction requiring revision surgery, DVT,   pulmonary embolism, heart attack, stroke and even death.  The patient states   despite these risks he wished to proceed with surgery.    DESCRIPTION OF PROCEDURE:  The patient was met in the preoperative holding   area.  His left shoulder was initialed as correct operative site.  He had an   opportunity to ask questions.  All questions were answered and informed   consent was obtained.  The patient was brought to the operating room and laid   supine on the operating table.  All bony and dependent prominences  were well   padded.  Upper extremity nerve block and general anesthesia were induced   without complication.  Ancef was administered for infection prophylaxis.  The   patient was placed in well-padded beachchair position.  Left upper extremity   was prepped and draped in the usual sterile fashion.  A formal timeout was   performed in which all parties agreed upon the correct patient, procedure and   operative site.  I began the procedure by while on the beachchair position   making an oblique incision in the anterior aspect of the shoulder for   deltopectoral approach.  I identified cephalic vein, retracted medially,   cauterized lateral branches, elevated the deltoid off the proximal humerus   where there was significant amount of adhesions, released the upper third   pectoralis major tendon to help mobilize the shoulder.  Deep to this, I   identified a flattened frayed and a very degenerative tendinopathic biceps   tendon to treat this pain and to get out of the prosthesis.  I performed an   open biceps tenodesis at resting tension, tenodesed into the upper half of   pectoralis major tendon with 0 Ethibond suture ____.  I then followed into the   rotator interval, released off the top of the glenoid while palpating and   protecting the axillary nerve and made a longitudinal tenotomy in   subscapularis tendon tagging the medial side for later repair.  I then   dislocated the shoulder, removed osteophytes and made an anatomic neck cut at   30 degrees retroversion, placed the cup protector, placed glenoid retractors   while palpating and protecting the axillary nerve and circumferentially   excised the labrum, removed osteophytes from the glenoid.  I then placed the   glenoid guide, reamed it just slightly preferentially anterior as he had very   slow just slight posterior wear.  Then, I drilled for the pegs for the glenoid   and cemented in a size medium polyethylene, which had excellent fixation and   good  bone-on-bone contact between the implant and the glenoid.  I turned my   attention back to the humerus, broached up to a size 12, which had excellent   pressfit, placed this with standard body, trialed multiple head.  The size   50x19 eccentric had the best coverage of the head with good range of motion   and 50% posterior translation.  I placed the final component, copiously   irrigated the joint, mobilized subscapularis tendon and repaired the tenotomy   with #5 Tycron suture.  I then copiously irrigated the wound with normal   saline, placed 1 deep Hemovac drain, closed the skin with 2-0 Monocryl suture,   skin stapler, all covered with Xeroform, 4x4s, Ioban and sling.  The patient   awoke from anesthesia without complication, was transferred in a stable   condition to the PACU where there were no immediate postoperative complaints.    POSTOPERATIVE PLAN:  Patient will be admitted overnight for pain control,   likely discharged home on postoperative day #1.       ____________________________________     MD RAHEEL Bull / CHRISTEN    DD:  09/25/2019 17:45:49  DT:  09/25/2019 21:49:59    D#:  [id number]  Job#:  [id number]

## 2019-09-26 NOTE — OR NURSING
Patient off unit with transport. Belongings bag on gurney with phone in bag. VSS. On room air. States all needs met at this time.

## 2019-09-26 NOTE — DISCHARGE INSTRUCTIONS
Discharge Instructions    Discharged to home by car with relative. Discharged via walking, hospital escort: Yes.  Special equipment needed: Sling    Be sure to schedule a follow-up appointment with your primary care doctor or any specialists as instructed.     Discharge Plan:   Influenza Vaccine Indication: Patient Refuses    I understand that a diet low in cholesterol, fat, and sodium is recommended for good health. Unless I have been given specific instructions below for another diet, I accept this instruction as my diet prescription.   Other diet: Regular    Special Instructions: Discharge instructions for the Orthopedic Patient    Follow up with Primary Care Physician within 2 weeks of discharge to home, regarding:  Review of medications and diagnostic testing.  Surveillance for medical complications.  Workup and treatment of osteoporosis, if appropriate.     -Is this a Joint Replacement patient? No    -Is this patient being discharged with medication to prevent blood clots?  No    · Is patient discharged on Warfarin / Coumadin?   No     Shoulder Joint Replacement, Care After  Refer to this sheet in the next few weeks. These instructions provide you with information on caring for yourself after your procedure. Your health care provider may also give you more specific instructions. Your treatment has been planned according to current medical practices, but problems sometimes occur. Call your health care provider if you have any problems or questions after your procedure.  WHAT TO EXPECT AFTER THE PROCEDURE  After your procedure, your arm and shoulder will typically be stiff and bruised. This will improve over time.  HOME CARE INSTRUCTIONS   · You may resume your normal diet and activities as directed by your surgeon.  · You should regain full use of your shoulder in 6 weeks.  · Your arm will be in a sling. You will need to wear this for 4-6 weeks after surgery.  · Wear the sling every night for at least the first  month, or as instructed by your surgeon.  · Do not use your arm to push yourself up in bed or from a chair. This requires too much muscle.  · Follow the program of home exercises suggested. Do the exercises 4-5 times a day for a month or as directed.  · Try not to overuse your shoulder. Overusing the shoulder is easy to do if this is the first time you have been pain free in a long time. Early overuse of the shoulder may result in later problems.  · Do not lift anything heavier than a cup of coffee for the first 6 weeks after surgery.  · Ask for help. Your health care provider may be able to suggest a clinic or agency for this if you do not have home support.  · Do not participate in contact sports or do any heavy lifting (more than 10 lb [4.5 kg]) for at least 6 months, or as directed.  · Apply ice to the injured area for the first 2 days after surgery:  ¨ Put ice in a plastic bag.  ¨ Place a towel between your skin and the bag.  ¨ Leave the ice on for 20 minutes, 2-3 times a day.  · Change dressings if necessary or as directed.  · Only take over-the-counter or prescription medicines for pain, discomfort, or fever as directed by your health care provider.  · Keep all follow-up appointments as directed.  SEEK MEDICAL CARE IF:  · You have redness, swelling, or increasing pain in the wound.  · You see pus coming from the wound.  · You have a fever.  · You notice a bad smell coming from the wound or dressing.  · The edges of the wound break open after sutures or staples have been removed.  · You have increasing pain with movement of the shoulder.  SEEK IMMEDIATE MEDICAL CARE IF:   · You develop a rash.  · You have chest pain or shortness of breath.  · You have any reaction or side effects to medicine given.  MAKE SURE YOU:  · Understand these instructions.  · Will watch your condition.  · Will get help right away if you are not doing well or get worse.     This information is not intended to replace advice given to you  by your health care provider. Make sure you discuss any questions you have with your health care provider.     Document Released: 07/07/2006 Document Revised: 12/23/2014 Document Reviewed: 07/17/2014  [employer] Interactive Patient Education ©2016 [employer]      Oxycodone tablets or capsules  What is this medicine?  OXYCODONE (ox i KOE done) is a pain reliever. It is used to treat moderate to severe pain.  This medicine may be used for other purposes; ask your health care provider or pharmacist if you have questions.  COMMON BRAND NAME(S): Dazidox, Endocodone, Oxaydo, OXECTA, OxyIR, Percolone, Roxicodone, ROXYBOND  What should I tell my health care provider before I take this medicine?  They need to know if you have any of these conditions:  -Coos's disease  -brain tumor  -head injury  -heart disease  -history of drug or alcohol abuse problem  -if you often drink alcohol  -kidney disease  -liver disease  -lung or breathing disease, like asthma  -mental illness  -pancreatic disease  -seizures  -thyroid disease  -an unusual or allergic reaction to oxycodone, codeine, hydrocodone, morphine, other medicines, foods, dyes, or preservatives  -pregnant or trying to get pregnant  -breast-feeding  How should I use this medicine?  Take this medicine by mouth with a glass of water. Follow the directions on the prescription label. You can take it with or without food. If it upsets your stomach, take it with food. Take your medicine at regular intervals. Do not take it more often than directed. Do not stop taking except on your doctor's advice.  Some brands of this medicine, like Oxecta, have special instructions. Ask your doctor or pharmacist if these directions are for you: Do not cut, crush or chew this medicine. Swallow only one tablet at a time. Do not wet, soak, or lick the tablet before you take it.  A special MedGuide will be given to you by the pharmacist with each prescription and refill. Be sure to read this  information carefully each time.  Talk to your pediatrician regarding the use of this medicine in children. Special care may be needed.  Overdosage: If you think you have taken too much of this medicine contact a poison control center or emergency room at once.  NOTE: This medicine is only for you. Do not share this medicine with others.  What if I miss a dose?  If you miss a dose, take it as soon as you can. If it is almost time for your next dose, take only that dose. Do not take double or extra doses.  What may interact with this medicine?  This medicine may interact with the following medications:  -alcohol  -antihistamines for allergy, cough and cold  -antiviral medicines for HIV or AIDS  -atropine  -certain antibiotics like clarithromycin, erythromycin, linezolid, rifampin  -certain medicines for anxiety or sleep  -certain medicines for bladder problems like oxybutynin, tolterodine  -certain medicines for depression like amitriptyline, fluoxetine, sertraline  -certain medicines for fungal infections like ketoconazole, itraconazole, voriconazole  -certain medicines for migraine headache like almotriptan, eletriptan, frovatriptan, naratriptan, rizatriptan, sumatriptan, zolmitriptan  -certain medicines for nausea or vomiting like dolasetron, ondansetron, palonosetron  -certain medicines for Parkinson's disease like benztropine, trihexyphenidyl  -certain medicines for seizures like phenobarbital, phenytoin, primidone  -certain medicines for stomach problems like dicyclomine, hyoscyamine  -certain medicines for travel sickness like scopolamine  -diuretics  -general anesthetics like halothane, isoflurane, methoxyflurane, propofol  -ipratropium  -local anesthetics like lidocaine, pramoxine, tetracaine  -MAOIs like Carbex, Eldepryl, Marplan, Nardil, and Parnate  -medicines that relax muscles for surgery  -methylene blue  -nilotinib  -other narcotic medicines for pain or cough  -phenothiazines like chlorpromazine,  mesoridazine, prochlorperazine, thioridazine  This list may not describe all possible interactions. Give your health care provider a list of all the medicines, herbs, non-prescription drugs, or dietary supplements you use. Also tell them if you smoke, drink alcohol, or use illegal drugs. Some items may interact with your medicine.  What should I watch for while using this medicine?  Tell your doctor or health care professional if your pain does not go away, if it gets worse, or if you have new or a different type of pain. You may develop tolerance to the medicine. Tolerance means that you will need a higher dose of the medicine for pain relief. Tolerance is normal and is expected if you take this medicine for a long time.  Do not suddenly stop taking your medicine because you may develop a severe reaction. Your body becomes used to the medicine. This does NOT mean you are addicted. Addiction is a behavior related to getting and using a drug for a non-medical reason. If you have pain, you have a medical reason to take pain medicine. Your doctor will tell you how much medicine to take. If your doctor wants you to stop the medicine, the dose will be slowly lowered over time to avoid any side effects.  There are different types of narcotic medicines (opiates). If you take more than one type at the same time or if you are taking another medicine that also causes drowsiness, you may have more side effects. Give your health care provider a list of all medicines you use. Your doctor will tell you how much medicine to take. Do not take more medicine than directed. Call emergency for help if you have problems breathing or unusual sleepiness.  You may get drowsy or dizzy. Do not drive, use machinery, or do anything that needs mental alertness until you know how the medicine affects you. Do not stand or sit up quickly, especially if you are an older patient. This reduces the risk of dizzy or fainting spells. Alcohol may  interfere with the effect of this medicine. Avoid alcoholic drinks.  This medicine will cause constipation. Try to have a bowel movement at least every 2 to 3 days. If you do not have a bowel movement for 3 days, call your doctor or health care professional.  Your mouth may get dry. Chewing sugarless gum or sucking hard candy, and drinking plenty of water may help. Contact your doctor if the problem does not go away or is severe.  What side effects may I notice from receiving this medicine?  Side effects that you should report to your doctor or health care professional as soon as possible:  -allergic reactions like skin rash, itching or hives, swelling of the face, lips, or tongue  -breathing problems  -confusion  -signs and symptoms of low blood pressure like dizziness; feeling faint or lightheaded, falls; unusually weak or tired  -trouble passing urine or change in the amount of urine  -trouble swallowing  Side effects that usually do not require medical attention (report to your doctor or health care professional if they continue or are bothersome):  -constipation  -dry mouth  -nausea, vomiting  -tiredness  This list may not describe all possible side effects. Call your doctor for medical advice about side effects. You may report side effects to FDA at 0-514-FDA-1675.  Where should I keep my medicine?  Keep out of the reach of children. This medicine can be abused. Keep your medicine in a safe place to protect it from theft. Do not share this medicine with anyone. Selling or giving away this medicine is dangerous and against the law.  Store at room temperature between 15 and 30 degrees C (59 and 86 degrees F). Protect from light. Keep container tightly closed.  This medicine may cause accidental overdose and death if it is taken by other adults, children, or pets. Flush any unused medicine down the toilet to reduce the chance of harm. Do not use the medicine after the expiration date.  NOTE: This sheet is a  summary. It may not cover all possible information. If you have questions about this medicine, talk to your doctor, pharmacist, or health care provider.  © 2018 Elsevier/Gold Standard (2016-11-01 16:55:57)        Depression / Suicide Risk    As you are discharged from this [facility] facility, it is important to learn how to keep safe from harming yourself.    Recognize the warning signs:  · Abrupt changes in personality, positive or negative- including increase in energy   · Giving away possessions  · Change in eating patterns- significant weight changes-  positive or negative  · Change in sleeping patterns- unable to sleep or sleeping all the time   · Unwillingness or inability to communicate  · Depression  · Unusual sadness, discouragement and loneliness  · Talk of wanting to die  · Neglect of personal appearance   · Rebelliousness- reckless behavior  · Withdrawal from people/activities they love  · Confusion- inability to concentrate     If you or a loved one observes any of these behaviors or has concerns about self-harm, here's what you can do:  · Talk about it- your feelings and reasons for harming yourself  · Remove any means that you might use to hurt yourself (examples: pills, rope, extension cords, firearm)  · Get professional help from the community (Mental Health, Substance Abuse, psychological counseling)  · Do not be alone:Call your Safe Contact- someone whom you trust who will be there for you.  · Call your local CRISIS HOTLINE 495-6528 or 266-482-2872  · Call your local Children's Mobile Crisis Response Team [place] (211) 673-9965 or www.Every1Mobile  · Call the toll free National Suicide Prevention Hotlines   · National Suicide Prevention Lifeline 746-597-WLMS (2149)  · National Hope Line Network 800-SUICIDE (990-3285)

## 2019-09-26 NOTE — PROGRESS NOTES
2 RN Skin Check    2 RN skin check complete.   Devices in place: SCDs.  Skin assessed under devices: yes.  Confirmed pressure ulcers found on: none.  New potential pressure ulcers noted on N/A. Wound consult placed No.  The following interventions in place Pillows.    Skin intact. Surgical dressing with hemovac to left shoulder CDI.

## 2019-09-26 NOTE — DIETARY
Nutrition Services: Day 1 of admit.  Samir Godinez is a 59 y.o. male with admitting DX of Left shoulder pain  Consult received for Wt Loss Unsure on Nutrition Admit Screen (MST 2)    Pt states his appetite is good and was good prior to admit as well. Pt denies wt loss and reports he wish he could lose wt. Pt states his UBW is 225 lbs (102.3 kg). Discussed snack options, added fruit once a day per pt preference.     Assessment:  Ht: 188 cm, Wt 9/25: 112.7 kg via stand up scale, BMI: 31.9 - Class I Obesity  Diet/Intake: Regular - Per chart pt PO % 1 meal documented      Evaluation:   1. Pt appears well nourished.   2. Per chart review pt's wt 6/24/18: 104.3 kg. Current wt increased from previous wt and pt's reported UBW. No wt loss noted.   3. Meds: ancef, celebrex, colace, pericolace  4. Last BM: 9/25    Malnutrition Risk: No criteria noted at this time.     Recommendations/Plan:  1. Encourage intake of meals  2. Document intake of all meals as % taken in ADL's to provide interdisciplinary communication across all shifts.   3. Monitor weight.  4. Nutrition rep will continue to see patient for ongoing meal and snack preferences.  5. Obtain supplement order per RD as needed.    RD available prn

## 2019-09-26 NOTE — ANESTHESIA TIME REPORT
Anesthesia Start and Stop Event Times     Date Time Event    9/25/2019 1337 Ready for Procedure     1446 Anesthesia Start        Responsible Staff  09/25/19    Name Role Begin End    Dillon Nance M.D. Anesth 1446         Preop Diagnosis (Free Text):  Pre-op Diagnosis     SHOULDER PAIN LEFT        Preop Diagnosis (Codes):    Post op Diagnosis  Arthropathy of left shoulder      Premium Reason  A. 3PM - 7AM    Comments:

## 2019-09-26 NOTE — OR NURSING
Report called to Dax MAYES. Plan of care discussed. Patient reports tolerable aching in left rib cage. Patient expresses desire to proceed to hospital room. VSS. On room air. Belongings bag at bedside.

## 2019-09-26 NOTE — ANESTHESIA POSTPROCEDURE EVALUATION
Patient: Samir Godinez    Procedure Summary     Date:  09/25/19 Room / Location:  [place] [address] / SURGERY [facility]    Anesthesia Start:  1446 Anesthesia Stop:      Procedure:  ARTHROPLASTY, SHOULDER, TOTAL- ANATOMIC (Left Shoulder) Diagnosis:  (SHOULDER PAIN LEFT)    Surgeon:  Denzel Veliz M.D. Responsible Provider:  Dillon Nance M.D.    Anesthesia Type:  general, peripheral nerve block ASA Status:  2          Final Anesthesia Type: general, peripheral nerve block  Last vitals  BP   Blood Pressure: 150/75    Temp   36.4 °C (97.5 °F)    Pulse   Pulse: 71   Resp   16    SpO2   97 %      Anesthesia Post Evaluation    Patient location during evaluation: PACU  Patient participation: complete - patient participated  Level of consciousness: awake and alert  Pain score: 0    Airway patency: patent  Anesthetic complications: no  Cardiovascular status: hemodynamically stable  Respiratory status: acceptable  Hydration status: euvolemic    PONV: none           Nurse Pain Score: 5 (NPRS)

## 2019-09-26 NOTE — PROGRESS NOTES
Received report from PACU. Arrived to floor without signs of distress. Ambulated from stretcher in hallway to bed in room. No dizziness or lightheadedness reported. VS stable. HR slightly elevated. Tolerating PO fluids without nausea.

## 2019-09-26 NOTE — PROGRESS NOTES
Patient sitting up in bed, sling in place. Reports tingling and numbness to left thumb with nerve block in place. Patient reports tolerable aching/soreness in left rib cage. Repositioned and medicated for comfort. Ice pack in place. Patient's s/o called and provided with update and room number. Patient states all other needs are met at this time. VSS

## 2019-09-27 VITALS
DIASTOLIC BLOOD PRESSURE: 72 MMHG | HEIGHT: 74 IN | WEIGHT: 248.46 LBS | TEMPERATURE: 97.7 F | HEART RATE: 77 BPM | OXYGEN SATURATION: 94 % | SYSTOLIC BLOOD PRESSURE: 137 MMHG | BODY MASS INDEX: 31.89 KG/M2 | RESPIRATION RATE: 18 BRPM

## 2019-09-27 PROCEDURE — A9270 NON-COVERED ITEM OR SERVICE: HCPCS | Performed by: SURGERY

## 2019-09-27 PROCEDURE — 700102 HCHG RX REV CODE 250 W/ 637 OVERRIDE(OP): Performed by: PHYSICIAN ASSISTANT

## 2019-09-27 PROCEDURE — 700112 HCHG RX REV CODE 229: Performed by: SURGERY

## 2019-09-27 PROCEDURE — A9270 NON-COVERED ITEM OR SERVICE: HCPCS | Performed by: PHYSICIAN ASSISTANT

## 2019-09-27 PROCEDURE — 700102 HCHG RX REV CODE 250 W/ 637 OVERRIDE(OP): Performed by: SURGERY

## 2019-09-27 RX ORDER — OXYCODONE AND ACETAMINOPHEN 10; 325 MG/1; MG/1
1 TABLET ORAL EVERY 4 HOURS PRN
Qty: 80 TAB | Refills: 0 | Status: SHIPPED | OUTPATIENT
Start: 2019-09-27 | End: 2019-10-12

## 2019-09-27 RX ORDER — DOCUSATE SODIUM 100 MG/1
100 CAPSULE, LIQUID FILLED ORAL 2 TIMES DAILY
Qty: 60 CAP | Refills: 0 | Status: SHIPPED | OUTPATIENT
Start: 2019-09-27 | End: 2019-10-13

## 2019-09-27 RX ADMIN — ACETAMINOPHEN 1000 MG: 500 TABLET ORAL at 05:54

## 2019-09-27 RX ADMIN — DOCUSATE SODIUM 100 MG: 100 CAPSULE, LIQUID FILLED ORAL at 05:54

## 2019-09-27 RX ADMIN — OXYCODONE HYDROCHLORIDE 15 MG: 5 TABLET ORAL at 03:04

## 2019-09-27 RX ADMIN — OXYCODONE HYDROCHLORIDE 15 MG: 5 TABLET ORAL at 08:48

## 2019-09-27 RX ADMIN — CELECOXIB 200 MG: 200 CAPSULE ORAL at 05:54

## 2019-09-27 NOTE — CARE PLAN
Problem: Communication  Goal: The ability to communicate needs accurately and effectively will improve  Outcome: PROGRESSING AS EXPECTED     Problem: Discharge Barriers/Planning  Goal: Patient's continuum of care needs will be met  Outcome: PROGRESSING AS EXPECTED

## 2019-09-27 NOTE — PROGRESS NOTES
"   Orthopaedic Progress Note    Interval changes:  Patient with some pain control issues -sharyn increased to 5-15mg po Q3  DC tomorrow am as long as pain controlled  Hv with 60cc output over last 24 hours    ROS - Patient denies any new issues except per above.       /63   Pulse 70   Temp 37.3 °C (99.1 °F) (Temporal)   Resp 18   Ht 1.88 m (6' 2\")   Wt 112.7 kg (248 lb 7.3 oz)   SpO2 93%       Patient seen and examined  No acute distress  Breathing non labored  RRR  LUE dressing in place HV with sanguinous exudate noted, DNVI, moves all fingers, cap refill <2 sec.        Assessment/Plan:  DC home tomorrow if pain controlled - sharyn increased to 15mg po Q3   POD#1 S/P:  1.  Left anatomic total shoulder arthroplasty.  2.  Left open biceps tenodesis  Wt bearing status - NWB LUE  Wound care/Drains - dressing left in place  Future Procedures - none planned  Sutures/Staples out- 10-14 days post operatively  PT/OT-initiated  Antibiotics: completed  DVT Prophylaxis- TEDS/SCDs/Foot pumps  [place]-none  Case Coordination for Discharge Planning - Disposition home tomorrow     "

## 2019-09-27 NOTE — PROGRESS NOTES
Charge RN reviewed discharge instructions and prescriptions with patient, patient discharged, refused escort.

## 2019-09-27 NOTE — DISCHARGE SUMMARY
DISCHARGE SUMMARY    PATIENTS NAME: Samir Godinez    MRN: [id number]    CSN: [id number]    ADMIT DATE:  9/25/2019    DISCHARGE DATE: 9/27/2019    ADMIT MD: Denzel Veliz M.D.    DISCHARGE MD: Denzel Veliz MD    REASON FOR ADMIT: Left shoulder failed conservative management    PRINCIPLE DIAGNOSIS: Left shoulder osteoarthritis.  Left biceps tendinopathy    SECONDARY DIAGNOSIS:none    PROCEDURES: 9/25/19  Denzel Veliz MD Left anatomic total shoulder arthroplasty with open biceps tenodesis     CONSULTATIONS: Denzel Veliz MD     HOSPITAL COURSE: Patient is a 59 year old male who has had failed conservative management of his left shoulder pain. Dr Veliz felt that the only option left was surgical intervention.  After explaining the indications, risks, benefits, and alternatives the patient wished to proceed with surgical intervention.  The patient was taken to the OR for the above mentioned procedure.  He had no complications and minimal blood loss. He has done well with mobilization and his pain has been well controlled with oral medications. He is now ready for DC at this time.     DISCHARGE LOCATION:home    DVT PROPHYLAXSIS:ambulatory    ANTIBIOTICS:completed    WEIGHT BEARING:non weight bearing left upper extremity    FOLLOW UP: 10-14 days post operatively with TERRENCE Bermudez.*    DISCHARGE DIAGNOSIS:status post left anatomic total shoulder arthroplasty with open biceps tenodesis     MEDICATIONS:   Current Outpatient Medications   Medication Sig Dispense Refill   • oxyCODONE-acetaminophen (PERCOCET-10)  MG Tab Take 1 Tab by mouth every four hours as needed for Severe Pain for up to 15 days. 80 Tab 0   • docusate sodium (COLACE) 100 MG Cap Take 1 Cap by mouth 2 times a day. 60 Cap 0

## 2019-10-06 ENCOUNTER — HOSPITAL ENCOUNTER (EMERGENCY)
Facility: MEDICAL CENTER | Age: 59
End: 2019-10-06
Payer: COMMERCIAL

## 2019-10-06 VITALS
WEIGHT: 250.66 LBS | OXYGEN SATURATION: 98 % | TEMPERATURE: 97.9 F | HEIGHT: 74 IN | DIASTOLIC BLOOD PRESSURE: 73 MMHG | RESPIRATION RATE: 14 BRPM | SYSTOLIC BLOOD PRESSURE: 124 MMHG | HEART RATE: 82 BPM | BODY MASS INDEX: 32.17 KG/M2

## 2019-10-06 PROCEDURE — 302449 STATCHG TRIAGE ONLY (STATISTIC)

## 2019-10-07 NOTE — ED TRIAGE NOTES
Pt stating shoulder replacement approx 1 week ago to his left arm and can not tolerate the pain. Pt also reporting dizziness

## 2019-10-13 ENCOUNTER — HOSPITAL ENCOUNTER (OUTPATIENT)
Facility: MEDICAL CENTER | Age: 59
End: 2019-10-14
Attending: EMERGENCY MEDICINE | Admitting: INTERNAL MEDICINE
Payer: COMMERCIAL

## 2019-10-13 ENCOUNTER — APPOINTMENT (OUTPATIENT)
Dept: RADIOLOGY | Facility: MEDICAL CENTER | Age: 59
End: 2019-10-13
Attending: EMERGENCY MEDICINE
Payer: COMMERCIAL

## 2019-10-13 DIAGNOSIS — G89.18 POST-OP PAIN: ICD-10-CM

## 2019-10-13 DIAGNOSIS — G93.41 ACUTE METABOLIC ENCEPHALOPATHY: ICD-10-CM

## 2019-10-13 DIAGNOSIS — S09.90XA CLOSED HEAD INJURY, INITIAL ENCOUNTER: ICD-10-CM

## 2019-10-13 DIAGNOSIS — S20.219A CONTUSION OF CHEST WALL, UNSPECIFIED LATERALITY, INITIAL ENCOUNTER: ICD-10-CM

## 2019-10-13 DIAGNOSIS — S22.31XA CLOSED FRACTURE OF ONE RIB OF RIGHT SIDE, INITIAL ENCOUNTER: ICD-10-CM

## 2019-10-13 DIAGNOSIS — S01.81XA FACIAL LACERATION, INITIAL ENCOUNTER: ICD-10-CM

## 2019-10-13 PROBLEM — G47.00 INSOMNIA: Status: ACTIVE | Noted: 2019-10-13

## 2019-10-13 PROBLEM — R07.81 RIB PAIN ON LEFT SIDE: Status: ACTIVE | Noted: 2019-10-13

## 2019-10-13 PROBLEM — Z96.612 STATUS POST TOTAL SHOULDER ARTHROPLASTY, LEFT: Status: ACTIVE | Noted: 2019-10-13

## 2019-10-13 PROBLEM — R91.8 PULMONARY NODULES: Status: ACTIVE | Noted: 2019-10-13

## 2019-10-13 LAB
ALBUMIN SERPL BCP-MCNC: 4 G/DL (ref 3.2–4.9)
ALBUMIN/GLOB SERPL: 1.4 G/DL
ALP SERPL-CCNC: 94 U/L (ref 30–99)
ALT SERPL-CCNC: 16 U/L (ref 2–50)
ANION GAP SERPL CALC-SCNC: 12 MMOL/L (ref 0–11.9)
APTT PPP: 26 SEC (ref 24.7–36)
AST SERPL-CCNC: 19 U/L (ref 12–45)
BASOPHILS # BLD AUTO: 0.6 % (ref 0–1.8)
BASOPHILS # BLD: 0.06 K/UL (ref 0–0.12)
BILIRUB SERPL-MCNC: 0.3 MG/DL (ref 0.1–1.5)
BUN SERPL-MCNC: 20 MG/DL (ref 8–22)
CALCIUM SERPL-MCNC: 9.3 MG/DL (ref 8.4–10.2)
CHLORIDE SERPL-SCNC: 105 MMOL/L (ref 96–112)
CO2 SERPL-SCNC: 20 MMOL/L (ref 20–33)
CREAT SERPL-MCNC: 0.8 MG/DL (ref 0.5–1.4)
EKG IMPRESSION: NORMAL
EOSINOPHIL # BLD AUTO: 0.01 K/UL (ref 0–0.51)
EOSINOPHIL NFR BLD: 0.1 % (ref 0–6.9)
ERYTHROCYTE [DISTWIDTH] IN BLOOD BY AUTOMATED COUNT: 40.9 FL (ref 35.9–50)
GLOBULIN SER CALC-MCNC: 2.8 G/DL (ref 1.9–3.5)
GLUCOSE SERPL-MCNC: 131 MG/DL (ref 65–99)
HCT VFR BLD AUTO: 37.2 % (ref 42–52)
HGB BLD-MCNC: 12.6 G/DL (ref 14–18)
IMM GRANULOCYTES # BLD AUTO: 0.05 K/UL (ref 0–0.11)
IMM GRANULOCYTES NFR BLD AUTO: 0.5 % (ref 0–0.9)
INR PPP: 0.97 (ref 0.87–1.13)
LIPASE SERPL-CCNC: 21 U/L (ref 7–58)
LYMPHOCYTES # BLD AUTO: 1.15 K/UL (ref 1–4.8)
LYMPHOCYTES NFR BLD: 12 % (ref 22–41)
MCH RBC QN AUTO: 30.1 PG (ref 27–33)
MCHC RBC AUTO-ENTMCNC: 33.9 G/DL (ref 33.7–35.3)
MCV RBC AUTO: 88.8 FL (ref 81.4–97.8)
MONOCYTES # BLD AUTO: 0.64 K/UL (ref 0–0.85)
MONOCYTES NFR BLD AUTO: 6.7 % (ref 0–13.4)
NEUTROPHILS # BLD AUTO: 7.65 K/UL (ref 1.82–7.42)
NEUTROPHILS NFR BLD: 80.1 % (ref 44–72)
NRBC # BLD AUTO: 0 K/UL
NRBC BLD-RTO: 0 /100 WBC
PLATELET # BLD AUTO: 372 K/UL (ref 164–446)
PMV BLD AUTO: 9 FL (ref 9–12.9)
POTASSIUM SERPL-SCNC: 4.1 MMOL/L (ref 3.6–5.5)
PROT SERPL-MCNC: 6.8 G/DL (ref 6–8.2)
PROTHROMBIN TIME: 13 SEC (ref 12–14.6)
RBC # BLD AUTO: 4.19 M/UL (ref 4.7–6.1)
SODIUM SERPL-SCNC: 137 MMOL/L (ref 135–145)
TSH SERPL DL<=0.005 MIU/L-ACNC: 1.34 UIU/ML (ref 0.38–5.33)
WBC # BLD AUTO: 9.6 K/UL (ref 4.8–10.8)

## 2019-10-13 PROCEDURE — A9270 NON-COVERED ITEM OR SERVICE: HCPCS | Performed by: INTERNAL MEDICINE

## 2019-10-13 PROCEDURE — 99285 EMERGENCY DEPT VISIT HI MDM: CPT

## 2019-10-13 PROCEDURE — 99220 PR INITIAL OBSERVATION CARE,LEVL III: CPT | Performed by: INTERNAL MEDICINE

## 2019-10-13 PROCEDURE — 36415 COLL VENOUS BLD VENIPUNCTURE: CPT

## 2019-10-13 PROCEDURE — 700105 HCHG RX REV CODE 258: Performed by: INTERNAL MEDICINE

## 2019-10-13 PROCEDURE — G0378 HOSPITAL OBSERVATION PER HR: HCPCS

## 2019-10-13 PROCEDURE — 72125 CT NECK SPINE W/O DYE: CPT

## 2019-10-13 PROCEDURE — 700102 HCHG RX REV CODE 250 W/ 637 OVERRIDE(OP): Performed by: INTERNAL MEDICINE

## 2019-10-13 PROCEDURE — 700111 HCHG RX REV CODE 636 W/ 250 OVERRIDE (IP): Performed by: INTERNAL MEDICINE

## 2019-10-13 PROCEDURE — 96374 THER/PROPH/DIAG INJ IV PUSH: CPT

## 2019-10-13 PROCEDURE — 71260 CT THORAX DX C+: CPT

## 2019-10-13 PROCEDURE — 84443 ASSAY THYROID STIM HORMONE: CPT

## 2019-10-13 PROCEDURE — 85025 COMPLETE CBC W/AUTO DIFF WBC: CPT

## 2019-10-13 PROCEDURE — 96375 TX/PRO/DX INJ NEW DRUG ADDON: CPT

## 2019-10-13 PROCEDURE — 700117 HCHG RX CONTRAST REV CODE 255: Performed by: EMERGENCY MEDICINE

## 2019-10-13 PROCEDURE — 93005 ELECTROCARDIOGRAM TRACING: CPT | Performed by: EMERGENCY MEDICINE

## 2019-10-13 PROCEDURE — 700111 HCHG RX REV CODE 636 W/ 250 OVERRIDE (IP): Performed by: EMERGENCY MEDICINE

## 2019-10-13 PROCEDURE — 80053 COMPREHEN METABOLIC PANEL: CPT

## 2019-10-13 PROCEDURE — 70450 CT HEAD/BRAIN W/O DYE: CPT

## 2019-10-13 PROCEDURE — 85730 THROMBOPLASTIN TIME PARTIAL: CPT

## 2019-10-13 PROCEDURE — 83690 ASSAY OF LIPASE: CPT

## 2019-10-13 PROCEDURE — 96372 THER/PROPH/DIAG INJ SC/IM: CPT

## 2019-10-13 PROCEDURE — 85610 PROTHROMBIN TIME: CPT

## 2019-10-13 PROCEDURE — 96376 TX/PRO/DX INJ SAME DRUG ADON: CPT

## 2019-10-13 RX ORDER — ACETAMINOPHEN 325 MG/1
650 TABLET ORAL EVERY 6 HOURS PRN
Status: DISCONTINUED | OUTPATIENT
Start: 2019-10-13 | End: 2019-10-14 | Stop reason: HOSPADM

## 2019-10-13 RX ORDER — PROMETHAZINE HYDROCHLORIDE 25 MG/1
12.5-25 SUPPOSITORY RECTAL EVERY 4 HOURS PRN
Status: DISCONTINUED | OUTPATIENT
Start: 2019-10-13 | End: 2019-10-14 | Stop reason: HOSPADM

## 2019-10-13 RX ORDER — AMOXICILLIN 250 MG
2 CAPSULE ORAL 2 TIMES DAILY
Status: DISCONTINUED | OUTPATIENT
Start: 2019-10-13 | End: 2019-10-14 | Stop reason: HOSPADM

## 2019-10-13 RX ORDER — MORPHINE SULFATE 4 MG/ML
2-5 INJECTION, SOLUTION INTRAMUSCULAR; INTRAVENOUS EVERY 4 HOURS PRN
Status: DISCONTINUED | OUTPATIENT
Start: 2019-10-13 | End: 2019-10-14

## 2019-10-13 RX ORDER — PROMETHAZINE HYDROCHLORIDE 25 MG/1
12.5-25 TABLET ORAL EVERY 4 HOURS PRN
Status: DISCONTINUED | OUTPATIENT
Start: 2019-10-13 | End: 2019-10-14 | Stop reason: HOSPADM

## 2019-10-13 RX ORDER — IBUPROFEN 600 MG/1
1200 TABLET ORAL EVERY 6 HOURS PRN
COMMUNITY
End: 2020-10-13

## 2019-10-13 RX ORDER — PROCHLORPERAZINE EDISYLATE 5 MG/ML
5-10 INJECTION INTRAMUSCULAR; INTRAVENOUS EVERY 4 HOURS PRN
Status: DISCONTINUED | OUTPATIENT
Start: 2019-10-13 | End: 2019-10-14 | Stop reason: HOSPADM

## 2019-10-13 RX ORDER — OXYCODONE HYDROCHLORIDE 5 MG/1
5 TABLET ORAL EVERY 4 HOURS PRN
Status: ON HOLD | COMMUNITY
End: 2019-10-14

## 2019-10-13 RX ORDER — ALPRAZOLAM 0.5 MG/1
0.5 TABLET ORAL
Status: ON HOLD | COMMUNITY
End: 2019-10-14 | Stop reason: SDUPTHER

## 2019-10-13 RX ORDER — SODIUM CHLORIDE 9 MG/ML
INJECTION, SOLUTION INTRAVENOUS CONTINUOUS
Status: DISCONTINUED | OUTPATIENT
Start: 2019-10-13 | End: 2019-10-14 | Stop reason: HOSPADM

## 2019-10-13 RX ORDER — ONDANSETRON 4 MG/1
4 TABLET, ORALLY DISINTEGRATING ORAL EVERY 4 HOURS PRN
Status: DISCONTINUED | OUTPATIENT
Start: 2019-10-13 | End: 2019-10-14 | Stop reason: HOSPADM

## 2019-10-13 RX ORDER — ONDANSETRON 2 MG/ML
4 INJECTION INTRAMUSCULAR; INTRAVENOUS EVERY 4 HOURS PRN
Status: DISCONTINUED | OUTPATIENT
Start: 2019-10-13 | End: 2019-10-14 | Stop reason: HOSPADM

## 2019-10-13 RX ORDER — OXYCODONE HYDROCHLORIDE 5 MG/1
5 TABLET ORAL EVERY 4 HOURS PRN
Status: DISCONTINUED | OUTPATIENT
Start: 2019-10-13 | End: 2019-10-14

## 2019-10-13 RX ORDER — ONDANSETRON 2 MG/ML
4 INJECTION INTRAMUSCULAR; INTRAVENOUS ONCE
Status: COMPLETED | OUTPATIENT
Start: 2019-10-13 | End: 2019-10-13

## 2019-10-13 RX ORDER — POLYETHYLENE GLYCOL 3350 17 G/17G
1 POWDER, FOR SOLUTION ORAL
Status: DISCONTINUED | OUTPATIENT
Start: 2019-10-13 | End: 2019-10-14 | Stop reason: HOSPADM

## 2019-10-13 RX ORDER — BISACODYL 10 MG
10 SUPPOSITORY, RECTAL RECTAL
Status: DISCONTINUED | OUTPATIENT
Start: 2019-10-13 | End: 2019-10-14 | Stop reason: HOSPADM

## 2019-10-13 RX ORDER — MORPHINE SULFATE 4 MG/ML
4 INJECTION, SOLUTION INTRAMUSCULAR; INTRAVENOUS ONCE
Status: COMPLETED | OUTPATIENT
Start: 2019-10-13 | End: 2019-10-13

## 2019-10-13 RX ORDER — OXYCODONE AND ACETAMINOPHEN 10; 325 MG/1; MG/1
1-2 TABLET ORAL EVERY 4 HOURS PRN
Status: ON HOLD | COMMUNITY
End: 2019-10-14

## 2019-10-13 RX ADMIN — ENOXAPARIN SODIUM 40 MG: 100 INJECTION SUBCUTANEOUS at 18:08

## 2019-10-13 RX ADMIN — OXYCODONE HYDROCHLORIDE 5 MG: 5 TABLET ORAL at 21:28

## 2019-10-13 RX ADMIN — IOHEXOL 100 ML: 350 INJECTION, SOLUTION INTRAVENOUS at 13:35

## 2019-10-13 RX ADMIN — MORPHINE SULFATE 4 MG: 4 INJECTION INTRAVENOUS at 22:11

## 2019-10-13 RX ADMIN — MORPHINE SULFATE 4 MG: 4 INJECTION INTRAVENOUS at 18:12

## 2019-10-13 RX ADMIN — ONDANSETRON 4 MG: 2 INJECTION INTRAMUSCULAR; INTRAVENOUS at 13:40

## 2019-10-13 RX ADMIN — SODIUM CHLORIDE: 9 INJECTION, SOLUTION INTRAVENOUS at 18:14

## 2019-10-13 RX ADMIN — MORPHINE SULFATE 4 MG: 4 INJECTION INTRAVENOUS at 13:40

## 2019-10-13 RX ADMIN — SENNOSIDES, DOCUSATE SODIUM 2 TABLET: 50; 8.6 TABLET, FILM COATED ORAL at 18:11

## 2019-10-13 ASSESSMENT — ENCOUNTER SYMPTOMS
FALLS: 1
HEADACHES: 0
FEVER: 0
DIARRHEA: 0
MEMORY LOSS: 1
BACK PAIN: 0
FOCAL WEAKNESS: 0
SPUTUM PRODUCTION: 0
HEARTBURN: 0
DIZZINESS: 0
COUGH: 0
CHILLS: 0
HALLUCINATIONS: 0
DEPRESSION: 0
ABDOMINAL PAIN: 0
SPEECH CHANGE: 1
SHORTNESS OF BREATH: 1
CLAUDICATION: 0
WEAKNESS: 1
NAUSEA: 0
BLOOD IN STOOL: 0
VOMITING: 0
MYALGIAS: 0
PALPITATIONS: 0
SORE THROAT: 0

## 2019-10-13 ASSESSMENT — LIFESTYLE VARIABLES
TOTAL SCORE: 0
EVER_SMOKED: NEVER
CONSUMPTION TOTAL: NEGATIVE
HAVE PEOPLE ANNOYED YOU BY CRITICIZING YOUR DRINKING: NO
ON A TYPICAL DAY WHEN YOU DRINK ALCOHOL HOW MANY DRINKS DO YOU HAVE: 0
EVER_SMOKED: NEVER
TOTAL SCORE: 0
AVERAGE NUMBER OF DAYS PER WEEK YOU HAVE A DRINK CONTAINING ALCOHOL: 0
HAVE YOU EVER FELT YOU SHOULD CUT DOWN ON YOUR DRINKING: NO
HOW MANY TIMES IN THE PAST YEAR HAVE YOU HAD 5 OR MORE DRINKS IN A DAY: 0
EVER HAD A DRINK FIRST THING IN THE MORNING TO STEADY YOUR NERVES TO GET RID OF A HANGOVER: NO
EVER FELT BAD OR GUILTY ABOUT YOUR DRINKING: NO
ALCOHOL_USE: NO
TOTAL SCORE: 0

## 2019-10-13 ASSESSMENT — COGNITIVE AND FUNCTIONAL STATUS - GENERAL
SUGGESTED CMS G CODE MODIFIER DAILY ACTIVITY: CI
SUGGESTED CMS G CODE MODIFIER MOBILITY: CI
CLIMB 3 TO 5 STEPS WITH RAILING: A LITTLE
DRESSING REGULAR UPPER BODY CLOTHING: A LITTLE
MOBILITY SCORE: 23
DAILY ACTIVITIY SCORE: 23

## 2019-10-13 ASSESSMENT — PATIENT HEALTH QUESTIONNAIRE - PHQ9
2. FEELING DOWN, DEPRESSED, IRRITABLE, OR HOPELESS: NOT AT ALL
SUM OF ALL RESPONSES TO PHQ9 QUESTIONS 1 AND 2: 0
1. LITTLE INTEREST OR PLEASURE IN DOING THINGS: NOT AT ALL

## 2019-10-13 ASSESSMENT — PAIN SCALES - WONG BAKER: WONGBAKER_NUMERICALRESPONSE: HURTS AS MUCH AS POSSIBLE

## 2019-10-13 NOTE — ED PROVIDER NOTES
ED Provider Note    CHIEF COMPLAINT  Chief Complaint   Patient presents with   • GLF     pt is recovering from L shoulder s/p 10 days. pt fell last night walking into the wall. pt sustained injury to L side of forehead, L chest wall and R flank pain. pt denies taking blood thinners. not sure if LOC.    • Head Laceration       HPI  Samir Godinez is a 59 y.o. male who presents to the emergency department for evaluation of frequent falls.  Patient about 2 weeks status post left total shoulder replacement.  He has been having a very difficult postoperative course he has not been taking pain medicines been taking Xanax at night.  He has been having hard time sleeping and gets up frequently to get up and walk around.  He has had this gradual and steady decline associate with not sleeping is not doing very well.  States last night he is out of bed and he fell multiple times.  He hit his head.  He injured his neck and he injured his left chest wall.  He has significant pain in his left chest wall with taking a deep breath.  He denies any other acute concerns or complaints.  Pain is severe.  States is been very unstable on his feet but denies any focal numbness tingling or weakness.  Unsure how long this is been going on.    REVIEW OF SYSTEMS  See HPI for further details. All other systems are negative.    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 No pertinent family history.    SOCIAL HISTORY  Social History     Socioeconomic History   • Marital status: Single     Spouse name: Not on file   • Number of children: Not on file   • Years of education: Not on file   • Highest education level: Not on file   Occupational History   • Not on file   Social Needs   • Financial resource strain: Not on file   • Food insecurity:      Worry: Not on file     Inability: Not on file   • Transportation needs:     Medical: Not on file     Non-medical: Not on file   Tobacco Use   • Smoking status: Never Smoker   • Smokeless tobacco: Never Used   Substance and Sexual Activity   • Alcohol use: No   • Drug use: No   • Sexual activity: Not on file   Lifestyle   • Physical activity:     Days per week: Not on file     Minutes per session: Not on file   • Stress: Not on file   Relationships   • Social connections:     Talks on phone: Not on file     Gets together: Not on file     Attends Yazidism service: Not on file     Active member of club or organization: Not on file     Attends meetings of clubs or organizations: Not on file     Relationship status: Not on file   • Intimate partner violence:     Fear of current or ex partner: Not on file     Emotionally abused: Not on file     Physically abused: Not on file     Forced sexual activity: Not on file   Other Topics Concern   • Not on file   Social History Narrative    ** Merged History Encounter **            SURGICAL HISTORY  Past Surgical History:   Procedure Laterality Date   • PB RECONSTR TOTAL SHOULDER IMPLANT Left 9/25/2019    Procedure: ARTHROPLASTY, SHOULDER, TOTAL- ANATOMIC;  Surgeon: Denzel Veliz M.D.;  Location: SURGERY [facility];  Service: Orthopedics   • HIP ARTHROPLASTY MIS TOTAL  11/2014    right   • HIP REPLACEMENT, TOTAL  2008   • OTHER ORTHOPEDIC SURGERY      knee surgery left       CURRENT MEDICATIONS  Home Medications     Reviewed by Deanna Cadena (Pharmacy Tech) on 10/13/19 at 1222  Med List Status: Complete   Medication Last Dose Status   ibuprofen (MOTRIN) 600 MG Tab 10/13/2019 Active   oxyCODONE immediate-release (ROXICODONE) 5 MG Tab > 3 days Active   oxyCODONE-acetaminophen (PERCOCET-10)  MG Tab > 4 days Active                ALLERGIES  No Known Allergies    PHYSICAL EXAM  VITAL SIGNS: /74   Pulse 74   Temp 36.5 °C (97.7 °F) (Temporal)   Resp  "18   Ht 1.88 m (6' 2\")   Wt 114 kg (251 lb 5.2 oz)   SpO2 98%   BMI 32.27 kg/m²    Constitutional: Awake mildly ill-appearing.  Somewhat slow to respond..   HENT: Normocephalic, large deep abrasion less of the forehead., Bilateral external ears normal, Oropharynx moist, No oral exudates, Nose normal.   Eyes: PERRL, EOMI, Conjunctiva normal,  Neck: Normal range of motion, No tenderness, Supple, No stridor.   Cardiovascular: Normal heart rate, Normal rhythm, No murmurs, No rubs, No gallops.   Thorax & Lungs: Normal breath sounds, No respiratory distress, No wheezing, left chest wall is tender and crepitant.  Abrasion on the the posterior aspect of the right chest.  This is tenderness as well  Abdomen: Bowel sounds normal, Soft, No tenderness  Skin: Warm, Dry, No erythema, No rash.   Back: No tenderness, No CVA tenderness.  Musculoskeletal: Good range of motion in all major joints.  Left shoulder has a well-healed surgical incision.  Is mildly hot to the touch his pain with range of motion.  States it is improving.  Normal neurovascular exam  Neurologic: Alert, No focal deficits noted.  Cranial nerves are intact.  He has normal strength and sensation both lower extremities normal coordination.  Normal strength and sensation in the right upper extremity.  No pronator drift.  Normal coordination.  Left upper extremity has normal grasp and sensation.  Unable to perform more detailed neurologic exam because of postsurgical pain from his shoulder replacement   Psychiatric: Affect normal,    Results for orders placed or performed during the hospital encounter of 10/13/19   CBC WITH DIFFERENTIAL   Result Value Ref Range    WBC 9.6 4.8 - 10.8 K/uL    RBC 4.19 (L) 4.70 - 6.10 M/uL    Hemoglobin 12.6 (L) 14.0 - 18.0 g/dL    Hematocrit 37.2 (L) 42.0 - 52.0 %    MCV 88.8 81.4 - 97.8 fL    MCH 30.1 27.0 - 33.0 pg    MCHC 33.9 33.7 - 35.3 g/dL    RDW 40.9 35.9 - 50.0 fL    Platelet Count 372 164 - 446 K/uL    MPV 9.0 9.0 - 12.9 " fL    Neutrophils-Polys 80.10 (H) 44.00 - 72.00 %    Lymphocytes 12.00 (L) 22.00 - 41.00 %    Monocytes 6.70 0.00 - 13.40 %    Eosinophils 0.10 0.00 - 6.90 %    Basophils 0.60 0.00 - 1.80 %    Immature Granulocytes 0.50 0.00 - 0.90 %    Nucleated RBC 0.00 /100 WBC    Neutrophils (Absolute) 7.65 (H) 1.82 - 7.42 K/uL    Lymphs (Absolute) 1.15 1.00 - 4.80 K/uL    Monos (Absolute) 0.64 0.00 - 0.85 K/uL    Eos (Absolute) 0.01 0.00 - 0.51 K/uL    Baso (Absolute) 0.06 0.00 - 0.12 K/uL    Immature Granulocytes (abs) 0.05 0.00 - 0.11 K/uL    NRBC (Absolute) 0.00 K/uL   COMP METABOLIC PANEL   Result Value Ref Range    Sodium 137 135 - 145 mmol/L    Potassium 4.1 3.6 - 5.5 mmol/L    Chloride 105 96 - 112 mmol/L    Co2 20 20 - 33 mmol/L    Anion Gap 12.0 (H) 0.0 - 11.9    Glucose 131 (H) 65 - 99 mg/dL    Bun 20 8 - 22 mg/dL    Creatinine 0.80 0.50 - 1.40 mg/dL    Calcium 9.3 8.4 - 10.2 mg/dL    AST(SGOT) 19 12 - 45 U/L    ALT(SGPT) 16 2 - 50 U/L    Alkaline Phosphatase 94 30 - 99 U/L    Total Bilirubin 0.3 0.1 - 1.5 mg/dL    Albumin 4.0 3.2 - 4.9 g/dL    Total Protein 6.8 6.0 - 8.2 g/dL    Globulin 2.8 1.9 - 3.5 g/dL    A-G Ratio 1.4 g/dL   LIPASE   Result Value Ref Range    Lipase 21 7 - 58 U/L   PROTHROMBIN TIME   Result Value Ref Range    PT 13.0 12.0 - 14.6 sec    INR 0.97 0.87 - 1.13   APTT   Result Value Ref Range    APTT 26.0 24.7 - 36.0 sec   ESTIMATED GFR   Result Value Ref Range    GFR If African American >60 >60 mL/min/1.73 m 2    GFR If Non African American >60 >60 mL/min/1.73 m 2   EKG (NOW)   Result Value Ref Range    Report       [facility] Emergency [facility].    Test Date:  2019-10-13  Pt Name:    AROLDO WRIGHT            Department: [facility]  MRN:        [id number]                      [facility]  Gender:     Male                         Technician: REEMA  :        1960                   Requested By:KARUNA LEON  Order #:    [id number]                    Reading MD: KARUNA JOHNSON  CAROLYN. AMD    Measurements  Intervals                                Axis  Rate:       72                           P:          44  GA:         160                          QRS:        -4  QRSD:       98                           T:          7  QT:         400  QTc:        438    Interpretive Statements  SINUS RHYTHM  Compared to ECG 10/18/2009 20:19:29  T-wave abnormality no longer present    Electronically Signed On 10- 14:10:03 PDT by KARUNA LEON. [facility]            RADIOLOGY/PROCEDURES  CT-CHEST,ABDOMEN,PELVIS WITH   Final Result      Fracture of the posterior 10th rib on the right.      No pulmonary contusion or pneumothorax is identified.      No solid organ or vascular injury is identified.      There may be an 8 mm cystic lesion within the pancreatic head. Follow-up pancreatic protocol MRI is recommended.      Colonic diverticulosis.      Small hiatal hernia.      Bilateral atelectasis.      Pulmonary nodules for which follow-up CT chest is recommended.      Low Risk: CT at 6-12 months, then consider CT at 18-24 months      High Risk: CT at 6-12 months, then CT at 18-24 months      Low Risk - Minimal or absent history of smoking and of other known risk factors.      High Risk - History of smoking or of other known risk factors.      Note: These recommendations do not apply to lung cancer screening, patients with immunosuppression, or patients with known primary cancer.      Fleischner Society 2017 Guidelines for Management of Incidentally Detected Pulmonary Nodules in Adults      CT-CSPINE WITHOUT PLUS RECONS   Final Result      No acute fracture or dislocation seen in the CT scan of the cervical spine.      CT-HEAD W/O   Final Result      No acute intracranial abnormality is identified.      Fat density suprasellar lesion containing calcifications is not significantly changed in size compared to the prior examination in 2009.      Small amount of fluid in the mastoid air cells on the right.                COURSE & MEDICAL DECISION MAKING  Pertinent Labs & Imaging studies reviewed. (See chart for details)    Patient presents emergency department after sustaining multiple falls last night.  He is about 2 weeks out from a shoulder procedure where to complete shoulder replacement.  He has been having increasing pain.  He has not been taking his pain medications.   Because his pain is poorly controlled he has not been sleeping well.  Last night he might have taken a little too much Xanax.  When he got up he multiple falls.  He injured his head, and injured his chest.    Patient seems confused on my initial evaluation is obvious signs of head neck injury and some tenderness throughout his torso as well as some obvious abrasions.  He has clicking in the chest wall with palpation, and auscultation.    CT of the head was obtained because of his head injury.  This is negative.  CT the neck was obtained because not clear by Nexus criteria.  CT of the torso was obtained because he has tenderness of the chest wall and abdomen concerning for intrathoracic intra-abdominal injury.      The vision is found to have a rib fracture and some chronic abnormalities in his brain CT.    Patient is reassessed he is able to walk with a slow shuffling broad wide-based gait.  I have considered stroke in the differential diagnosis and certainly this remains a differential diagnosis.  He would not be a candidate for alteplase because the time of onset is outside the window for alteplase ministration.  Does not have a stroke score was suggested benefit from clot retrieval.  Additionally, his symptoms would be quite atypical for stroke.    I suspect his frequent falls related to polypharmacy.  Certainly a broad official diagnosis remains but this is the most likely.  He is pain is poorly controlled even after IV morphine.  He will be admitted for pain control, observation and continued treatment.      These frequent falls and gait  instability related to polypharmacy no need to metabolize some of the benzodiazepines he has been taking.  The patient will be admitted to the hospital for further work-up and treatment.  Care is transferred to Dr. Barroso.            FINAL IMPRESSION  1. Closed head injury, initial encounter     2. Facial laceration, initial encounter     3. Contusion of chest wall, unspecified laterality, initial encounter     4. Closed fracture of one rib of right side, initial encounter     5. Post-op pain         2.   3.         Electronically signed by: Neeraj Brady, 10/13/2019 12:34 PM'

## 2019-10-13 NOTE — ED TRIAGE NOTES
Chief Complaint   Patient presents with   • GLF     pt is recovering from L shoulder s/p 10 days. pt fell last night walking into the wall. pt sustained injury to L side of forehead, L chest wall and R flank pain. pt denies taking blood thinners. not sure if LOC.    • Head Laceration

## 2019-10-14 ENCOUNTER — PATIENT OUTREACH (OUTPATIENT)
Dept: HEALTH INFORMATION MANAGEMENT | Facility: OTHER | Age: 59
End: 2019-10-14

## 2019-10-14 VITALS
WEIGHT: 251.32 LBS | SYSTOLIC BLOOD PRESSURE: 151 MMHG | HEIGHT: 74 IN | OXYGEN SATURATION: 91 % | DIASTOLIC BLOOD PRESSURE: 84 MMHG | BODY MASS INDEX: 32.25 KG/M2 | RESPIRATION RATE: 18 BRPM | TEMPERATURE: 98.7 F | HEART RATE: 65 BPM

## 2019-10-14 PROBLEM — G93.41 ACUTE METABOLIC ENCEPHALOPATHY: Status: RESOLVED | Noted: 2019-10-13 | Resolved: 2019-10-14

## 2019-10-14 LAB
ALBUMIN SERPL BCP-MCNC: 3.8 G/DL (ref 3.2–4.9)
BASOPHILS # BLD AUTO: 0.6 % (ref 0–1.8)
BASOPHILS # BLD: 0.05 K/UL (ref 0–0.12)
BUN SERPL-MCNC: 17 MG/DL (ref 8–22)
CALCIUM SERPL-MCNC: 9 MG/DL (ref 8.4–10.2)
CHLORIDE SERPL-SCNC: 103 MMOL/L (ref 96–112)
CO2 SERPL-SCNC: 18 MMOL/L (ref 20–33)
CREAT SERPL-MCNC: 0.74 MG/DL (ref 0.5–1.4)
EOSINOPHIL # BLD AUTO: 0.2 K/UL (ref 0–0.51)
EOSINOPHIL NFR BLD: 2.4 % (ref 0–6.9)
ERYTHROCYTE [DISTWIDTH] IN BLOOD BY AUTOMATED COUNT: 42.7 FL (ref 35.9–50)
GLUCOSE SERPL-MCNC: 117 MG/DL (ref 65–99)
HCT VFR BLD AUTO: 38.2 % (ref 42–52)
HGB BLD-MCNC: 12.6 G/DL (ref 14–18)
IMM GRANULOCYTES # BLD AUTO: 0.05 K/UL (ref 0–0.11)
IMM GRANULOCYTES NFR BLD AUTO: 0.6 % (ref 0–0.9)
LYMPHOCYTES # BLD AUTO: 1.7 K/UL (ref 1–4.8)
LYMPHOCYTES NFR BLD: 20.7 % (ref 22–41)
MCH RBC QN AUTO: 29.8 PG (ref 27–33)
MCHC RBC AUTO-ENTMCNC: 33 G/DL (ref 33.7–35.3)
MCV RBC AUTO: 90.3 FL (ref 81.4–97.8)
MONOCYTES # BLD AUTO: 0.73 K/UL (ref 0–0.85)
MONOCYTES NFR BLD AUTO: 8.9 % (ref 0–13.4)
NEUTROPHILS # BLD AUTO: 5.49 K/UL (ref 1.82–7.42)
NEUTROPHILS NFR BLD: 66.8 % (ref 44–72)
NRBC # BLD AUTO: 0 K/UL
NRBC BLD-RTO: 0 /100 WBC
PHOSPHATE SERPL-MCNC: 3.5 MG/DL (ref 2.5–4.5)
PLATELET # BLD AUTO: 387 K/UL (ref 164–446)
PMV BLD AUTO: 9.6 FL (ref 9–12.9)
POTASSIUM SERPL-SCNC: 4.1 MMOL/L (ref 3.6–5.5)
RBC # BLD AUTO: 4.23 M/UL (ref 4.7–6.1)
SODIUM SERPL-SCNC: 138 MMOL/L (ref 135–145)
WBC # BLD AUTO: 8.2 K/UL (ref 4.8–10.8)

## 2019-10-14 PROCEDURE — 700102 HCHG RX REV CODE 250 W/ 637 OVERRIDE(OP): Performed by: INTERNAL MEDICINE

## 2019-10-14 PROCEDURE — 80069 RENAL FUNCTION PANEL: CPT

## 2019-10-14 PROCEDURE — G0378 HOSPITAL OBSERVATION PER HR: HCPCS

## 2019-10-14 PROCEDURE — A9270 NON-COVERED ITEM OR SERVICE: HCPCS | Performed by: INTERNAL MEDICINE

## 2019-10-14 PROCEDURE — 36415 COLL VENOUS BLD VENIPUNCTURE: CPT

## 2019-10-14 PROCEDURE — 99217 PR OBSERVATION CARE DISCHARGE: CPT | Performed by: INTERNAL MEDICINE

## 2019-10-14 PROCEDURE — 97535 SELF CARE MNGMENT TRAINING: CPT

## 2019-10-14 PROCEDURE — 96376 TX/PRO/DX INJ SAME DRUG ADON: CPT

## 2019-10-14 PROCEDURE — 85025 COMPLETE CBC W/AUTO DIFF WBC: CPT

## 2019-10-14 PROCEDURE — 700111 HCHG RX REV CODE 636 W/ 250 OVERRIDE (IP): Performed by: INTERNAL MEDICINE

## 2019-10-14 PROCEDURE — 700105 HCHG RX REV CODE 258: Performed by: INTERNAL MEDICINE

## 2019-10-14 RX ORDER — ACETAMINOPHEN 325 MG/1
650 TABLET ORAL EVERY 6 HOURS PRN
Qty: 30 TAB | Refills: 0 | Status: SHIPPED | OUTPATIENT
Start: 2019-10-14 | End: 2020-10-13

## 2019-10-14 RX ORDER — ALPRAZOLAM 0.5 MG/1
0.5 TABLET ORAL NIGHTLY PRN
Qty: 2 TAB | Refills: 0
Start: 2019-10-14 | End: 2019-10-14

## 2019-10-14 RX ORDER — HYDROCODONE BITARTRATE AND ACETAMINOPHEN 7.5; 325 MG/1; MG/1
1 TABLET ORAL EVERY 6 HOURS PRN
Qty: 8 TAB | Refills: 0 | Status: SHIPPED | OUTPATIENT
Start: 2019-10-14 | End: 2019-10-16

## 2019-10-14 RX ORDER — HYDROCODONE BITARTRATE AND ACETAMINOPHEN 7.5; 325 MG/1; MG/1
1 TABLET ORAL EVERY 6 HOURS PRN
Status: DISCONTINUED | OUTPATIENT
Start: 2019-10-14 | End: 2019-10-14 | Stop reason: HOSPADM

## 2019-10-14 RX ADMIN — MORPHINE SULFATE 4 MG: 4 INJECTION INTRAVENOUS at 02:07

## 2019-10-14 RX ADMIN — MORPHINE SULFATE 4 MG: 4 INJECTION INTRAVENOUS at 06:12

## 2019-10-14 RX ADMIN — HYDROCODONE BITARTRATE AND ACETAMINOPHEN 1 TABLET: 7.5; 325 TABLET ORAL at 09:46

## 2019-10-14 RX ADMIN — SENNOSIDES, DOCUSATE SODIUM 2 TABLET: 50; 8.6 TABLET, FILM COATED ORAL at 06:13

## 2019-10-14 RX ADMIN — SODIUM CHLORIDE: 9 INJECTION, SOLUTION INTRAVENOUS at 06:11

## 2019-10-14 NOTE — PROGRESS NOTES
Received report from day shift nurse.   Assumed pt care    Pt is A&Ox4, resting comfortably in bed.   Pt on r.a.. No signs of SOB/respiratory distress.   Labs noted, VSS.   Pt c/o ribcage pain; prn Oxycodone 5mg  and Morphine 4mg given per MAR  S/p L total shoulder; however he does not wear any sling or immobilizer. Per pt, his ortho MD does not require him to wear any sling/immobilizer and no hyperextension exercise/external rotation .   Assessment completed.   Fall precautions in place.   Call light and personal belongings within reach.   Continue to monitor

## 2019-10-14 NOTE — ASSESSMENT & PLAN NOTE
He complains of pain in his left rib cage although his 10th rib fracture was on the right.  Repeat chest x-ray in the morning  Monitor O2 saturations

## 2019-10-14 NOTE — H&P
"Hospital Medicine History & Physical Note    Date of Service  10/13/2019    Primary Care Physician  Rafael Forman M.D.    Consultants  None    Code Status  Full    Chief Complaint  Confusion, falls    History of Presenting Illness  59 y.o. male with a history of osteoarthritis and a recent Left anatomic total shoulder arthroplasty who presented 10/13/2019 with confusion and falls.    Patient had a non-complicated left total shoulder arthroplasty done on 9/25/2019.  He was discharged on 9/27/2019 and was given a prescription for Percocet.  He has been taking this medication daily for pain relief however still states that he has had a very hard time sleeping due to discomfort.  He has had very inadequate sleep and has felt increasingly fatigued over the last few weeks.  He ran out of Percocet 3 days ago.  He has been taking Xanax nightly for sleep which he has a prescription for.  He has been having some gait instability and has had a few falls.  He lives at home with his son who reports that he had to \"send him back to bed\" multiple times throughout the night.  This morning, the patient woke up with a bump over his left eyebrow and with rib pain bilaterally.  Does not remember how he suffered these injuries however states it feels like \"someone came in his house and beat him up\".  He went to his significant other's house today and she felt he looked extremely unwell and confused therefore she brought him to the ER for evaluation.    He denies any fevers or chills.  He is having difficulty taking a deep breath due to rib pain but otherwise denies any shortness of breath.  He is having moderate discomfort in his left shoulder, pain in bilateral sides of his rib cage and he feels very tired.  He denies any changes in urinary habits.  Denies any diarrhea or constipation.  He has not had any leg swelling.    ER course: CT of the chest abdomen pelvis reveals a pancreatic cyst as well as pulmonary nodules.  He also " demonstrated a right posterior 10th rib fracture but no other acute abnormalities.  He will be admitted for supportive care and pain control.    Review of Systems  Review of Systems   Constitutional: Positive for malaise/fatigue. Negative for chills and fever.   HENT: Negative for sore throat.    Respiratory: Positive for shortness of breath. Negative for cough and sputum production.    Cardiovascular: Positive for chest pain (Chest wall). Negative for palpitations and claudication.   Gastrointestinal: Negative for abdominal pain, blood in stool, diarrhea, heartburn, nausea and vomiting.   Genitourinary: Negative for dysuria and frequency.   Musculoskeletal: Positive for falls. Negative for back pain and myalgias.   Neurological: Positive for speech change (For his significant other, this is a result) and weakness. Negative for dizziness, focal weakness and headaches.   Psychiatric/Behavioral: Positive for memory loss. Negative for depression and hallucinations.   All other systems reviewed and are negative.      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    Surgical History   has a past surgical history that includes hip replacement, total (2008); other orthopedic surgery; hip arthroplasty mis total (11/2014); and pr reconstr total shoulder implant (Left, 9/25/2019).     Family History  Denies family history of blood clotting disorders, heart attack or stroke that he is aware of    Social History   reports that he has never smoked. He has never used smokeless tobacco. He reports that he does not drink alcohol or use drugs.    Allergies  No Known Allergies    Medications  Prior to Admission Medications   Prescriptions Last Dose Informant Patient Reported? Taking?   ALPRAZolam (XANAX) 0.5 MG Tab 10/12/2019 at 2000 Patient Yes Yes   Sig: Take 0.5 mg by mouth every bedtime.   ibuprofen (MOTRIN) 600 MG  Tab 10/13/2019 at 1000 Patient Yes Yes   Sig: Take 1,800 mg by mouth every 6 hours as needed for Moderate Pain.   oxyCODONE immediate-release (ROXICODONE) 5 MG Tab > 3 days at Unknown Patient Yes Yes   Sig: Take 5 mg by mouth every four hours as needed for Severe Pain.   oxyCODONE-acetaminophen (PERCOCET-10)  MG Tab > 4 days at Unknown Patient Yes Yes   Sig: Take 1-2 Tabs by mouth every four hours as needed for Severe Pain.      Facility-Administered Medications: None       Physical Exam  Temp:  [36.5 °C (97.7 °F)-36.7 °C (98 °F)] 36.7 °C (98 °F)  Pulse:  [61-78] 61  Resp:  [13-20] 16  BP: (129-195)/(69-96) 150/69  SpO2:  [95 %-98 %] 97 %    Physical Exam   Constitutional: He is oriented to person, place, and time. He appears well-developed and well-nourished. No distress.   Ecchymosis and edema over left eyebrow.  Superficial laceration over his left temple   HENT:   Head: Normocephalic.   Mouth/Throat: No oropharyngeal exudate.   Eyes: Pupils are equal, round, and reactive to light. Conjunctivae are normal.   Neck: Normal range of motion. No tracheal deviation present.   Cardiovascular: Normal rate and regular rhythm.   No murmur heard.  Pulmonary/Chest: Effort normal. No respiratory distress. He has no wheezes. He exhibits no tenderness.   Abdominal: Soft. He exhibits distension. There is no tenderness. There is no guarding.   Musculoskeletal: He exhibits no edema or tenderness.   Decreased range of motion left shoulder   Lymphadenopathy:     He has no cervical adenopathy.   Neurological: He is alert and oriented to person, place, and time. No cranial nerve deficit.   Skin: Skin is warm and dry. No rash noted.   Well-healed and well approximated left shoulder surgical incision   Psychiatric: He has a normal mood and affect. His behavior is normal.   Nursing note and vitals reviewed.      Laboratory:  Recent Labs     10/13/19  1241   WBC 9.6   RBC 4.19*   HEMOGLOBIN 12.6*   HEMATOCRIT 37.2*   MCV 88.8   MCH  30.1   MCHC 33.9   RDW 40.9   PLATELETCT 372   MPV 9.0     Recent Labs     10/13/19  1241   SODIUM 137   POTASSIUM 4.1   CHLORIDE 105   CO2 20   GLUCOSE 131*   BUN 20   CREATININE 0.80   CALCIUM 9.3     Recent Labs     10/13/19  1241   ALTSGPT 16   ASTSGOT 19   ALKPHOSPHAT 94   TBILIRUBIN 0.3   LIPASE 21   GLUCOSE 131*     Recent Labs     10/13/19  1241   APTT 26.0   INR 0.97     No results for input(s): NTPROBNP in the last 72 hours.      No results for input(s): TROPONINT in the last 72 hours.    Urinalysis:    No results found     Imaging:  CT-CHEST,ABDOMEN,PELVIS WITH   Final Result      Fracture of the posterior 10th rib on the right.      No pulmonary contusion or pneumothorax is identified.      No solid organ or vascular injury is identified.      There may be an 8 mm cystic lesion within the pancreatic head. Follow-up pancreatic protocol MRI is recommended.      Colonic diverticulosis.      Small hiatal hernia.      Bilateral atelectasis.      Pulmonary nodules for which follow-up CT chest is recommended.      Low Risk: CT at 6-12 months, then consider CT at 18-24 months      High Risk: CT at 6-12 months, then CT at 18-24 months      Low Risk - Minimal or absent history of smoking and of other known risk factors.      High Risk - History of smoking or of other known risk factors.      Note: These recommendations do not apply to lung cancer screening, patients with immunosuppression, or patients with known primary cancer.      Fleischner Society 2017 Guidelines for Management of Incidentally Detected Pulmonary Nodules in Adults      CT-CSPINE WITHOUT PLUS RECONS   Final Result      No acute fracture or dislocation seen in the CT scan of the cervical spine.      CT-HEAD W/O   Final Result      No acute intracranial abnormality is identified.      Fat density suprasellar lesion containing calcifications is not significantly changed in size compared to the prior examination in 2009.      Small amount of fluid  in the mastoid air cells on the right.               Assessment/Plan:  I anticipate this patient is appropriate for observation status at this time.    * Status post total shoulder arthroplasty, left  Assessment & Plan  His surgery was done on 10/2.  Upon discharge home he was given Percocet which he has been taking however he is still not been able to get good sleep due to discomfort at night.  He has been out of Percocet for 3 days  Shoulder incision is well approximated, no erythema  Activity: No external rotation at the left shoulder joint  PT/OT  Pain control, oxy, morphine, tylenol    Pulmonary nodules  Assessment & Plan  I discussed this with the patient  He has no smoking history however he does need a repeat CT scan done in 6 months, April 2020 and in 18 months, April 2021    Right rib fracture  Assessment & Plan  Seen on CT scan, 10th posterior rib    Insomnia  Assessment & Plan  For now I will hold off on any benzodiazepines as I suspect they may have been contributing to confusion and gait instability  Pain control    Acute metabolic encephalopathy  Assessment & Plan  He was brought in by his significant other saying he had slurred speech and confusion.  This has improved with IV fluids.  Head CT is negative  Monitor on telemetry  I suspect this may be related to sleep deprivation and taking Xanax  If he should have persistent symptoms, consider MRI however I do not think that is indicated at this time  He has no evidence of infection  Repeat chest x-ray in the morning to ensure he is not developing pneumonia  Check UA  Check TSH    Rib pain on left side  Assessment & Plan  He complains of pain in his left rib cage although his 10th rib fracture was on the right.  Repeat chest x-ray in the morning  Monitor O2 saturations        VTE prophylaxis: Lovenox

## 2019-10-14 NOTE — CARE PLAN
Admitted to room 215 from ER status post Total Lt shoulder replacement 2 weeks ago with frequent falls since surgery at home, abrasion noted to Lt Forehead. Pt states he does not have a sling post op and he is to remain NWB to LUE, good neuro vasc status to LUE. Wife present at time of admission and she states she has not been assisting pt at home because she has flu and does not wish for him to catch flu. Will provide pt safety with bed alarm and assist when OOB and give Morphine prn .

## 2019-10-14 NOTE — ASSESSMENT & PLAN NOTE
I discussed this with the patient  He has no smoking history however he does need a repeat CT scan done in 6 months, April 2020 and in 18 months, April 2021

## 2019-10-14 NOTE — DISCHARGE SUMMARY
Discharge Summary    CHIEF COMPLAINT ON ADMISSION  Chief Complaint   Patient presents with   • GLF     pt is recovering from L shoulder s/p 10 days. pt fell last night walking into the wall. pt sustained injury to L side of forehead, L chest wall and R flank pain. pt denies taking blood thinners. not sure if LOC.    • Head Laceration       Reason for Admission  GLF,Head Injury     Admission Date  10/13/2019    CODE STATUS  Full Code    HPI & HOSPITAL COURSE  This is a 59 y.o. male here with history of chronic pain with multiple narcotic and anxiolytic prescriptions given over the last two years. He was previously seen by Dr. Leigh for his pain management but stopped seeing him so he could get pain medication from his surgeon for his shoulder surgery.  He has a history of osteoarthritis and a recent Left anatomic total shoulder arthroplasty who presented 10/13/2019 with confusion and falls after taking two xanax and amitriptylline for sleep. He took 80 percocet and 5 oxycodone in the last 17 days and ran out of his pain medication. CT of the chest abdomen pelvis reveals a pancreatic cyst as well as pulmonary nodules.  He also demonstrated a right posterior 10th rib fracture. He was admitted for pain control and his mental status improved to his baseline and he was ambulating steadily.  I spent extra time with the patient educating him about narcotic and anxiolytic use and the dangers of these medications.        Therefore, he is discharged in good and stable condition to home with close outpatient follow-up.        Discharge Date  10/14/2019    FOLLOW UP ITEMS POST DISCHARGE  Orthopedic surgery Dr. Veliz as scheduled Monday October 21, 2019  Primary care provider for repeat CT of the chest in 6-12 months for monitoring of his pulmonary nodules  Pain management Dr. Leigh as soon as possible for pain control    DISCHARGE DIAGNOSES  Principal Problem:    Status post total shoulder arthroplasty, left POA:  Yes  Active Problems:    S/P total hip arthroplasty POA: Yes    Rib pain on left side POA: Yes    Insomnia POA: Yes    Right rib fracture POA: Yes    Pulmonary nodules POA: Yes  Resolved Problems:    Acute metabolic encephalopathy POA: Yes      FOLLOW UP  No future appointments.  Rafael Forman M.D.  [address] [facility] [address]  [phone]          Denzel Veliz M.D.  [address]  [facility] [address]  [phone]            MEDICATIONS ON DISCHARGE     Medication List      START taking these medications      Instructions   acetaminophen 325 MG Tabs  Commonly known as:  TYLENOL   Take 2 Tabs by mouth every 6 hours as needed (Mild Pain; (Pain scale 1-3); Temp greater than 100.5 F).  Dose:  650 mg     HYDROcodone-acetaminophen 7.5-325 MG per tablet  Commonly known as:  NORCO   Take 1 Tab by mouth every 6 hours as needed for up to 2 days.  Dose:  1 Tab        CONTINUE taking these medications      Instructions   ibuprofen 600 MG Tabs  Commonly known as:  MOTRIN   Take 1,800 mg by mouth every 6 hours as needed for Moderate Pain.  Dose:  1,800 mg        STOP taking these medications    ALPRAZolam 0.5 MG Tabs  Commonly known as:  XANAX     oxyCODONE immediate-release 5 MG Tabs  Commonly known as:  ROXICODONE     oxyCODONE-acetaminophen  MG Tabs  Commonly known as:  PERCOCET-10            Allergies  No Known Allergies    DIET  Orders Placed This Encounter   Procedures   • Diet Order Regular     Standing Status:   Standing     Number of Occurrences:   1     Order Specific Question:   Diet:     Answer:   Regular [1]       ACTIVITY  As tolerated.  Weight bearing as tolerated    CONSULTATIONS  none    PROCEDURES  none    LABORATORY  Lab Results   Component Value Date    SODIUM 138 10/14/2019    POTASSIUM 4.1 10/14/2019    CHLORIDE 103 10/14/2019    CO2 18 (L) 10/14/2019    GLUCOSE 117 (H) 10/14/2019    BUN 17 10/14/2019    CREATININE 0.74 10/14/2019        Lab Results   Component Value Date     WBC 8.2 10/14/2019    HEMOGLOBIN 12.6 (L) 10/14/2019    HEMATOCRIT 38.2 (L) 10/14/2019    PLATELETCT 387 10/14/2019        Total time of the discharge process exceeds 38 minutes.

## 2019-10-14 NOTE — ASSESSMENT & PLAN NOTE
He was brought in by his significant other saying he had slurred speech and confusion.  This has improved with IV fluids.  Head CT is negative  Monitor on telemetry  I suspect this may be related to sleep deprivation and taking Xanax  If he should have persistent symptoms, consider MRI however I do not think that is indicated at this time  He has no evidence of infection  Repeat chest x-ray in the morning to ensure he is not developing pneumonia  Check UA  Check TSH

## 2019-10-14 NOTE — ASSESSMENT & PLAN NOTE
His surgery was done on 10/2.  Upon discharge home he was given Percocet which he has been taking however he is still not been able to get good sleep due to discomfort at night.  He has been out of Percocet for 3 days  Shoulder incision is well approximated, no erythema  Activity: No external rotation at the left shoulder joint  PT/OT  Pain control, oxy, morphine, tylenol

## 2019-10-14 NOTE — CARE PLAN
Problem: Safety  Goal: Will remain free from injury  Outcome: PROGRESSING AS EXPECTED  Goal: Will remain free from falls  Outcome: PROGRESSING AS EXPECTED     Problem: Communication  Goal: The ability to communicate needs accurately and effectively will improve  Outcome: MET     Problem: Infection  Goal: Will remain free from infection  Outcome: MET

## 2019-10-14 NOTE — DISCHARGE INSTRUCTIONS
"  Discharge Instructions per Tanja Miner M.D.    Follow up with primary care provider as needed  Follow up with orthopedic surgery as scheduled    DIET: regular    ACTIVITY:  No lifting with left arm    DIAGNOSIS: confusion and fall after taking oral pain medication    Return to ER if oversedation or increased confusion recur    Discharge Instructions    Discharged to home by car with relative. Discharged via wheelchair, hospital escort: Yes.  Special equipment needed: Not Applicable    Be sure to schedule a follow-up appointment with your primary care doctor or any specialists as instructed.     Discharge Plan:   Diet Plan: Discussed  Activity Level: Discussed  Confirmed Follow up Appointment: Appointment Scheduled  Confirmed Symptoms Management: Discussed  Medication Reconciliation Updated: Yes  Influenza Vaccine Indication: Not indicated: Previously immunized this influenza season and > 8 years of age    I understand that a diet low in cholesterol, fat, and sodium is recommended for good health. Unless I have been given specific instructions below for another diet, I accept this instruction as my diet prescription.   Other diet: regular    Special Instructions: Prevent Falls in Your Home    \"Falling once doubles your chance of falling again\"        -Center for Disease Control and Prevention    Falls in the home can lead to serious injury (fractures, brain injuries), hospitalizations, increased medical costs, and could even be fatal.  The good news is, there are many precautions you can take to avoid falls in your home and help keep you safe:     · If prescribed an assistive device (walker, crutches), use as instructed by the healthcare provider\"   · Remove any tripping hazards from your home, including loose cords, throw rugs and clutter  · Keep a nightlight on in dark (hallways, bathrooms, etc)   · Get up slowly, to make sure you feel okay before getting up  · Be aware of any side effects of your " medications: some medications may make you dizzy  · Place a non-skid rubber mat in your shower or tub-consider a shower bench or chair if unsteady on your feet  · Wear supportive shoes or non-skid socks when moving around  · Start an exercise program once approved by your provider.  If you are feeling weak following a hospital stay, talk to your doctor about home health or outpatient therapy programs designed to help rebuild your strength and endurance      · Is patient discharged on Warfarin / Coumadin?   No     Depression / Suicide Risk    As you are discharged from this [facility] facility, it is important to learn how to keep safe from harming yourself.    Recognize the warning signs:  · Abrupt changes in personality, positive or negative- including increase in energy   · Giving away possessions  · Change in eating patterns- significant weight changes-  positive or negative  · Change in sleeping patterns- unable to sleep or sleeping all the time   · Unwillingness or inability to communicate  · Depression  · Unusual sadness, discouragement and loneliness  · Talk of wanting to die  · Neglect of personal appearance   · Rebelliousness- reckless behavior  · Withdrawal from people/activities they love  · Confusion- inability to concentrate     If you or a loved one observes any of these behaviors or has concerns about self-harm, here's what you can do:  · Talk about it- your feelings and reasons for harming yourself  · Remove any means that you might use to hurt yourself (examples: pills, rope, extension cords, firearm)  · Get professional help from the community (Mental Health, Substance Abuse, psychological counseling)  · Do not be alone:Call your Safe Contact- someone whom you trust who will be there for you.  · Call your local CRISIS HOTLINE 866-1360 or 935-510-5893  · Call your local Children's Mobile Crisis Response Team [place] (336) 732-0577 or www.DNsolution  · Call the toll free MobileHandshake  Suicide Prevention Hotlines   · National Suicide Prevention Lifeline 469-164-NMDQ (6399)  · National Hope Line Network 800-SUICIDE (935-5169)

## 2019-10-14 NOTE — PROGRESS NOTES
Pt resting in bed. VSS. No signs of distress noted. Discussed POC for the day. C/O rib pain, medicated.

## 2019-10-14 NOTE — ASSESSMENT & PLAN NOTE
For now I will hold off on any benzodiazepines as I suspect they may have been contributing to confusion and gait instability  Pain control

## 2019-10-14 NOTE — PROGRESS NOTES
Pt discharged per order. Discussed discharge meds, activity, and follow up. Pt expressed understanding. Escorted to private transportation by staff.

## 2019-11-14 DIAGNOSIS — Z01.812 PRE-OPERATIVE LABORATORY EXAMINATION: ICD-10-CM

## 2019-11-14 LAB
ANION GAP SERPL CALC-SCNC: 7 MMOL/L (ref 0–11.9)
BUN SERPL-MCNC: 20 MG/DL (ref 8–22)
CALCIUM SERPL-MCNC: 9 MG/DL (ref 8.5–10.5)
CHLORIDE SERPL-SCNC: 107 MMOL/L (ref 96–112)
CO2 SERPL-SCNC: 25 MMOL/L (ref 20–33)
CREAT SERPL-MCNC: 0.83 MG/DL (ref 0.5–1.4)
ERYTHROCYTE [DISTWIDTH] IN BLOOD BY AUTOMATED COUNT: 43.6 FL (ref 35.9–50)
GLUCOSE SERPL-MCNC: 94 MG/DL (ref 65–99)
HCT VFR BLD AUTO: 40.8 % (ref 42–52)
HGB BLD-MCNC: 13.4 G/DL (ref 14–18)
MCH RBC QN AUTO: 30 PG (ref 27–33)
MCHC RBC AUTO-ENTMCNC: 32.8 G/DL (ref 33.7–35.3)
MCV RBC AUTO: 91.3 FL (ref 81.4–97.8)
PLATELET # BLD AUTO: 200 K/UL (ref 164–446)
PMV BLD AUTO: 9.9 FL (ref 9–12.9)
POTASSIUM SERPL-SCNC: 4 MMOL/L (ref 3.6–5.5)
RBC # BLD AUTO: 4.47 M/UL (ref 4.7–6.1)
SODIUM SERPL-SCNC: 139 MMOL/L (ref 135–145)
WBC # BLD AUTO: 5.5 K/UL (ref 4.8–10.8)

## 2019-11-14 PROCEDURE — 85027 COMPLETE CBC AUTOMATED: CPT

## 2019-11-14 PROCEDURE — 80048 BASIC METABOLIC PNL TOTAL CA: CPT

## 2019-11-14 PROCEDURE — 36415 COLL VENOUS BLD VENIPUNCTURE: CPT

## 2019-11-27 ENCOUNTER — ANESTHESIA (OUTPATIENT)
Dept: SURGERY | Facility: MEDICAL CENTER | Age: 59
DRG: 483 | End: 2019-11-27
Payer: COMMERCIAL

## 2019-11-27 ENCOUNTER — HOSPITAL ENCOUNTER (INPATIENT)
Facility: MEDICAL CENTER | Age: 59
LOS: 2 days | DRG: 483 | End: 2019-11-29
Attending: SURGERY | Admitting: SURGERY
Payer: COMMERCIAL

## 2019-11-27 ENCOUNTER — ANESTHESIA EVENT (OUTPATIENT)
Dept: SURGERY | Facility: MEDICAL CENTER | Age: 59
DRG: 483 | End: 2019-11-27
Payer: COMMERCIAL

## 2019-11-27 ENCOUNTER — APPOINTMENT (OUTPATIENT)
Dept: RADIOLOGY | Facility: MEDICAL CENTER | Age: 59
DRG: 483 | End: 2019-11-27
Attending: SURGERY
Payer: COMMERCIAL

## 2019-11-27 DIAGNOSIS — M19.011 ARTHRITIS OF RIGHT SHOULDER REGION: ICD-10-CM

## 2019-11-27 PROCEDURE — 700111 HCHG RX REV CODE 636 W/ 250 OVERRIDE (IP): Performed by: ANESTHESIOLOGY

## 2019-11-27 PROCEDURE — 160048 HCHG OR STATISTICAL LEVEL 1-5: Performed by: SURGERY

## 2019-11-27 PROCEDURE — 502000 HCHG MISC OR IMPLANTS RC 0278: Performed by: SURGERY

## 2019-11-27 PROCEDURE — 700111 HCHG RX REV CODE 636 W/ 250 OVERRIDE (IP): Performed by: SURGERY

## 2019-11-27 PROCEDURE — 0RRJ0JZ REPLACEMENT OF RIGHT SHOULDER JOINT WITH SYNTHETIC SUBSTITUTE, OPEN APPROACH: ICD-10-PCS | Performed by: SURGERY

## 2019-11-27 PROCEDURE — 500367 HCHG DRAIN KIT, HEMOVAC: Performed by: SURGERY

## 2019-11-27 PROCEDURE — 700102 HCHG RX REV CODE 250 W/ 637 OVERRIDE(OP): Performed by: ANESTHESIOLOGY

## 2019-11-27 PROCEDURE — 160029 HCHG SURGERY MINUTES - 1ST 30 MINS LEVEL 4: Performed by: SURGERY

## 2019-11-27 PROCEDURE — 160036 HCHG PACU - EA ADDL 30 MINS PHASE I: Performed by: SURGERY

## 2019-11-27 PROCEDURE — 502578 HCHG PACK, TOTAL HIP: Performed by: SURGERY

## 2019-11-27 PROCEDURE — 160009 HCHG ANES TIME/MIN: Performed by: SURGERY

## 2019-11-27 PROCEDURE — 73020 X-RAY EXAM OF SHOULDER: CPT | Mod: RT

## 2019-11-27 PROCEDURE — 700105 HCHG RX REV CODE 258: Performed by: SURGERY

## 2019-11-27 PROCEDURE — 501838 HCHG SUTURE GENERAL: Performed by: SURGERY

## 2019-11-27 PROCEDURE — A6223 GAUZE >16<=48 NO W/SAL W/O B: HCPCS | Performed by: SURGERY

## 2019-11-27 PROCEDURE — A9270 NON-COVERED ITEM OR SERVICE: HCPCS | Performed by: SURGERY

## 2019-11-27 PROCEDURE — 700102 HCHG RX REV CODE 250 W/ 637 OVERRIDE(OP): Performed by: SURGERY

## 2019-11-27 PROCEDURE — 0LS30ZZ REPOSITION RIGHT UPPER ARM TENDON, OPEN APPROACH: ICD-10-PCS | Performed by: SURGERY

## 2019-11-27 PROCEDURE — 3E0T3BZ INTRODUCTION OF ANESTHETIC AGENT INTO PERIPHERAL NERVES AND PLEXI, PERCUTANEOUS APPROACH: ICD-10-PCS | Performed by: ANESTHESIOLOGY

## 2019-11-27 PROCEDURE — 01N50ZZ RELEASE MEDIAN NERVE, OPEN APPROACH: ICD-10-PCS | Performed by: SURGERY

## 2019-11-27 PROCEDURE — 160041 HCHG SURGERY MINUTES - EA ADDL 1 MIN LEVEL 4: Performed by: SURGERY

## 2019-11-27 PROCEDURE — 700111 HCHG RX REV CODE 636 W/ 250 OVERRIDE (IP)

## 2019-11-27 PROCEDURE — 160035 HCHG PACU - 1ST 60 MINS PHASE I: Performed by: SURGERY

## 2019-11-27 PROCEDURE — 700101 HCHG RX REV CODE 250: Performed by: ANESTHESIOLOGY

## 2019-11-27 PROCEDURE — 700112 HCHG RX REV CODE 229: Performed by: SURGERY

## 2019-11-27 PROCEDURE — A4565 SLINGS: HCPCS | Performed by: SURGERY

## 2019-11-27 PROCEDURE — 501445 HCHG STAPLER, SKIN DISP: Performed by: SURGERY

## 2019-11-27 PROCEDURE — 770001 HCHG ROOM/CARE - MED/SURG/GYN PRIV*

## 2019-11-27 PROCEDURE — A6222 GAUZE <=16 IN NO W/SAL W/O B: HCPCS | Performed by: SURGERY

## 2019-11-27 PROCEDURE — 160022 HCHG BLOCK: Performed by: SURGERY

## 2019-11-27 PROCEDURE — A9270 NON-COVERED ITEM OR SERVICE: HCPCS | Performed by: ANESTHESIOLOGY

## 2019-11-27 PROCEDURE — 160002 HCHG RECOVERY MINUTES (STAT): Performed by: SURGERY

## 2019-11-27 DEVICE — IMPLANTABLE DEVICE: Type: IMPLANTABLE DEVICE | Site: SHOULDER | Status: FUNCTIONAL

## 2019-11-27 RX ORDER — GABAPENTIN 300 MG/1
300 CAPSULE ORAL ONCE
Status: COMPLETED | OUTPATIENT
Start: 2019-11-27 | End: 2019-11-27

## 2019-11-27 RX ORDER — BISACODYL 10 MG
10 SUPPOSITORY, RECTAL RECTAL
Status: DISCONTINUED | OUTPATIENT
Start: 2019-11-27 | End: 2019-11-29 | Stop reason: HOSPADM

## 2019-11-27 RX ORDER — TRANEXAMIC ACID 100 MG/ML
INJECTION, SOLUTION INTRAVENOUS PRN
Status: DISCONTINUED | OUTPATIENT
Start: 2019-11-27 | End: 2019-11-27 | Stop reason: SURG

## 2019-11-27 RX ORDER — SODIUM CHLORIDE, SODIUM LACTATE, POTASSIUM CHLORIDE, CALCIUM CHLORIDE 600; 310; 30; 20 MG/100ML; MG/100ML; MG/100ML; MG/100ML
INJECTION, SOLUTION INTRAVENOUS CONTINUOUS
Status: DISCONTINUED | OUTPATIENT
Start: 2019-11-27 | End: 2019-11-27 | Stop reason: HOSPADM

## 2019-11-27 RX ORDER — CEFAZOLIN SODIUM 2 G/100ML
2 INJECTION, SOLUTION INTRAVENOUS EVERY 8 HOURS
Status: COMPLETED | OUTPATIENT
Start: 2019-11-27 | End: 2019-11-28

## 2019-11-27 RX ORDER — HYDROMORPHONE HYDROCHLORIDE 2 MG/ML
INJECTION, SOLUTION INTRAMUSCULAR; INTRAVENOUS; SUBCUTANEOUS PRN
Status: DISCONTINUED | OUTPATIENT
Start: 2019-11-27 | End: 2019-11-27 | Stop reason: SURG

## 2019-11-27 RX ORDER — CEFAZOLIN SODIUM 1 G/3ML
INJECTION, POWDER, FOR SOLUTION INTRAMUSCULAR; INTRAVENOUS PRN
Status: DISCONTINUED | OUTPATIENT
Start: 2019-11-27 | End: 2019-11-27 | Stop reason: SURG

## 2019-11-27 RX ORDER — AMOXICILLIN 250 MG
1 CAPSULE ORAL
Status: DISCONTINUED | OUTPATIENT
Start: 2019-11-27 | End: 2019-11-29 | Stop reason: HOSPADM

## 2019-11-27 RX ORDER — CELECOXIB 200 MG/1
200 CAPSULE ORAL ONCE
Status: COMPLETED | OUTPATIENT
Start: 2019-11-27 | End: 2019-11-27

## 2019-11-27 RX ORDER — OXYCODONE HYDROCHLORIDE 5 MG/1
5 TABLET ORAL EVERY 4 HOURS PRN
Qty: 30 TAB | Refills: 0 | Status: SHIPPED | OUTPATIENT
Start: 2019-11-27 | End: 2019-12-04

## 2019-11-27 RX ORDER — MIDAZOLAM HYDROCHLORIDE 1 MG/ML
INJECTION INTRAMUSCULAR; INTRAVENOUS PRN
Status: DISCONTINUED | OUTPATIENT
Start: 2019-11-27 | End: 2019-11-27 | Stop reason: SURG

## 2019-11-27 RX ORDER — HYDRALAZINE HYDROCHLORIDE 20 MG/ML
5 INJECTION INTRAMUSCULAR; INTRAVENOUS
Status: DISCONTINUED | OUTPATIENT
Start: 2019-11-27 | End: 2019-11-27 | Stop reason: HOSPADM

## 2019-11-27 RX ORDER — HALOPERIDOL 5 MG/ML
1 INJECTION INTRAMUSCULAR
Status: DISCONTINUED | OUTPATIENT
Start: 2019-11-27 | End: 2019-11-27 | Stop reason: HOSPADM

## 2019-11-27 RX ORDER — HYDROMORPHONE HYDROCHLORIDE 1 MG/ML
0.5 INJECTION, SOLUTION INTRAMUSCULAR; INTRAVENOUS; SUBCUTANEOUS
Status: DISCONTINUED | OUTPATIENT
Start: 2019-11-27 | End: 2019-11-27 | Stop reason: HOSPADM

## 2019-11-27 RX ORDER — ROCURONIUM BROMIDE 10 MG/ML
INJECTION, SOLUTION INTRAVENOUS PRN
Status: DISCONTINUED | OUTPATIENT
Start: 2019-11-27 | End: 2019-11-27 | Stop reason: SURG

## 2019-11-27 RX ORDER — ACETAMINOPHEN 500 MG
1000 TABLET ORAL ONCE
Status: COMPLETED | OUTPATIENT
Start: 2019-11-27 | End: 2019-11-27

## 2019-11-27 RX ORDER — POLYETHYLENE GLYCOL 3350 17 G/17G
1 POWDER, FOR SOLUTION ORAL 2 TIMES DAILY PRN
Status: DISCONTINUED | OUTPATIENT
Start: 2019-11-27 | End: 2019-11-29 | Stop reason: HOSPADM

## 2019-11-27 RX ORDER — CLINDAMYCIN PHOSPHATE 150 MG/ML
INJECTION, SOLUTION INTRAVENOUS PRN
Status: DISCONTINUED | OUTPATIENT
Start: 2019-11-27 | End: 2019-11-27 | Stop reason: SURG

## 2019-11-27 RX ORDER — DIPHENHYDRAMINE HYDROCHLORIDE 50 MG/ML
12.5 INJECTION INTRAMUSCULAR; INTRAVENOUS
Status: DISCONTINUED | OUTPATIENT
Start: 2019-11-27 | End: 2019-11-27 | Stop reason: HOSPADM

## 2019-11-27 RX ORDER — MEPERIDINE HYDROCHLORIDE 25 MG/ML
12.5 INJECTION INTRAMUSCULAR; INTRAVENOUS; SUBCUTANEOUS
Status: DISCONTINUED | OUTPATIENT
Start: 2019-11-27 | End: 2019-11-27 | Stop reason: HOSPADM

## 2019-11-27 RX ORDER — OXYCODONE HCL 5 MG/5 ML
10 SOLUTION, ORAL ORAL
Status: COMPLETED | OUTPATIENT
Start: 2019-11-27 | End: 2019-11-27

## 2019-11-27 RX ORDER — HYDROMORPHONE HYDROCHLORIDE 1 MG/ML
0.4 INJECTION, SOLUTION INTRAMUSCULAR; INTRAVENOUS; SUBCUTANEOUS
Status: DISCONTINUED | OUTPATIENT
Start: 2019-11-27 | End: 2019-11-27 | Stop reason: HOSPADM

## 2019-11-27 RX ORDER — SCOLOPAMINE TRANSDERMAL SYSTEM 1 MG/1
1 PATCH, EXTENDED RELEASE TRANSDERMAL
Status: DISCONTINUED | OUTPATIENT
Start: 2019-11-27 | End: 2019-11-29 | Stop reason: HOSPADM

## 2019-11-27 RX ORDER — ONDANSETRON 2 MG/ML
INJECTION INTRAMUSCULAR; INTRAVENOUS PRN
Status: DISCONTINUED | OUTPATIENT
Start: 2019-11-27 | End: 2019-11-27 | Stop reason: SURG

## 2019-11-27 RX ORDER — LIDOCAINE HYDROCHLORIDE 10 MG/ML
INJECTION, SOLUTION EPIDURAL; INFILTRATION; INTRACAUDAL; PERINEURAL
Status: COMPLETED
Start: 2019-11-27 | End: 2019-11-27

## 2019-11-27 RX ORDER — DEXAMETHASONE SODIUM PHOSPHATE 4 MG/ML
4 INJECTION, SOLUTION INTRA-ARTICULAR; INTRALESIONAL; INTRAMUSCULAR; INTRAVENOUS; SOFT TISSUE
Status: DISCONTINUED | OUTPATIENT
Start: 2019-11-27 | End: 2019-11-29 | Stop reason: HOSPADM

## 2019-11-27 RX ORDER — OXYCODONE HYDROCHLORIDE 10 MG/1
10 TABLET ORAL
Status: DISCONTINUED | OUTPATIENT
Start: 2019-11-27 | End: 2019-11-29 | Stop reason: HOSPADM

## 2019-11-27 RX ORDER — LIDOCAINE HYDROCHLORIDE 20 MG/ML
INJECTION, SOLUTION EPIDURAL; INFILTRATION; INTRACAUDAL; PERINEURAL PRN
Status: DISCONTINUED | OUTPATIENT
Start: 2019-11-27 | End: 2019-11-27 | Stop reason: SURG

## 2019-11-27 RX ORDER — DEXAMETHASONE SODIUM PHOSPHATE 4 MG/ML
INJECTION, SOLUTION INTRA-ARTICULAR; INTRALESIONAL; INTRAMUSCULAR; INTRAVENOUS; SOFT TISSUE PRN
Status: DISCONTINUED | OUTPATIENT
Start: 2019-11-27 | End: 2019-11-27 | Stop reason: SURG

## 2019-11-27 RX ORDER — HALOPERIDOL 5 MG/ML
1 INJECTION INTRAMUSCULAR EVERY 6 HOURS PRN
Status: DISCONTINUED | OUTPATIENT
Start: 2019-11-27 | End: 2019-11-29 | Stop reason: HOSPADM

## 2019-11-27 RX ORDER — CELECOXIB 200 MG/1
200 CAPSULE ORAL 2 TIMES DAILY
Status: DISCONTINUED | OUTPATIENT
Start: 2019-11-27 | End: 2019-11-29 | Stop reason: HOSPADM

## 2019-11-27 RX ORDER — ENEMA 19; 7 G/133ML; G/133ML
1 ENEMA RECTAL
Status: DISCONTINUED | OUTPATIENT
Start: 2019-11-27 | End: 2019-11-29 | Stop reason: HOSPADM

## 2019-11-27 RX ORDER — OXYCODONE HCL 5 MG/5 ML
5 SOLUTION, ORAL ORAL
Status: COMPLETED | OUTPATIENT
Start: 2019-11-27 | End: 2019-11-27

## 2019-11-27 RX ORDER — OXYCODONE HYDROCHLORIDE 5 MG/1
5 TABLET ORAL
Status: DISCONTINUED | OUTPATIENT
Start: 2019-11-27 | End: 2019-11-29 | Stop reason: HOSPADM

## 2019-11-27 RX ORDER — DOCUSATE SODIUM 100 MG/1
100 CAPSULE, LIQUID FILLED ORAL 2 TIMES DAILY
Status: DISCONTINUED | OUTPATIENT
Start: 2019-11-27 | End: 2019-11-29 | Stop reason: HOSPADM

## 2019-11-27 RX ORDER — DIPHENHYDRAMINE HYDROCHLORIDE 50 MG/ML
25 INJECTION INTRAMUSCULAR; INTRAVENOUS EVERY 6 HOURS PRN
Status: DISCONTINUED | OUTPATIENT
Start: 2019-11-27 | End: 2019-11-29 | Stop reason: HOSPADM

## 2019-11-27 RX ORDER — AMOXICILLIN 250 MG
1 CAPSULE ORAL NIGHTLY
Status: DISCONTINUED | OUTPATIENT
Start: 2019-11-27 | End: 2019-11-29 | Stop reason: HOSPADM

## 2019-11-27 RX ORDER — ONDANSETRON 2 MG/ML
4 INJECTION INTRAMUSCULAR; INTRAVENOUS EVERY 4 HOURS PRN
Status: DISCONTINUED | OUTPATIENT
Start: 2019-11-27 | End: 2019-11-29 | Stop reason: HOSPADM

## 2019-11-27 RX ORDER — ACETAMINOPHEN 500 MG
1000 TABLET ORAL EVERY 6 HOURS
Status: DISCONTINUED | OUTPATIENT
Start: 2019-11-27 | End: 2019-11-29 | Stop reason: HOSPADM

## 2019-11-27 RX ORDER — ONDANSETRON 2 MG/ML
4 INJECTION INTRAMUSCULAR; INTRAVENOUS
Status: DISCONTINUED | OUTPATIENT
Start: 2019-11-27 | End: 2019-11-27 | Stop reason: HOSPADM

## 2019-11-27 RX ORDER — HYDROMORPHONE HYDROCHLORIDE 1 MG/ML
0.2 INJECTION, SOLUTION INTRAMUSCULAR; INTRAVENOUS; SUBCUTANEOUS
Status: DISCONTINUED | OUTPATIENT
Start: 2019-11-27 | End: 2019-11-27 | Stop reason: HOSPADM

## 2019-11-27 RX ORDER — SODIUM CHLORIDE, SODIUM LACTATE, POTASSIUM CHLORIDE, CALCIUM CHLORIDE 600; 310; 30; 20 MG/100ML; MG/100ML; MG/100ML; MG/100ML
INJECTION, SOLUTION INTRAVENOUS CONTINUOUS
Status: ACTIVE | OUTPATIENT
Start: 2019-11-27 | End: 2019-11-28

## 2019-11-27 RX ORDER — BUPIVACAINE HYDROCHLORIDE 5 MG/ML
INJECTION, SOLUTION EPIDURAL; INTRACAUDAL PRN
Status: DISCONTINUED | OUTPATIENT
Start: 2019-11-27 | End: 2019-11-27 | Stop reason: SURG

## 2019-11-27 RX ADMIN — SODIUM CHLORIDE, POTASSIUM CHLORIDE, SODIUM LACTATE AND CALCIUM CHLORIDE: 600; 310; 30; 20 INJECTION, SOLUTION INTRAVENOUS at 15:40

## 2019-11-27 RX ADMIN — CELECOXIB 200 MG: 200 CAPSULE ORAL at 18:36

## 2019-11-27 RX ADMIN — GABAPENTIN 300 MG: 300 CAPSULE ORAL at 12:13

## 2019-11-27 RX ADMIN — CEFAZOLIN 2 G: 330 INJECTION, POWDER, FOR SOLUTION INTRAMUSCULAR; INTRAVENOUS at 14:30

## 2019-11-27 RX ADMIN — EPHEDRINE SULFATE 10 MG: 50 INJECTION, SOLUTION INTRAVENOUS at 15:13

## 2019-11-27 RX ADMIN — TRANEXAMIC ACID 1000 MG: 100 INJECTION, SOLUTION INTRAVENOUS at 14:30

## 2019-11-27 RX ADMIN — SUGAMMADEX 200 MG: 100 INJECTION, SOLUTION INTRAVENOUS at 16:02

## 2019-11-27 RX ADMIN — DEXAMETHASONE SODIUM PHOSPHATE 2 MG: 4 INJECTION, SOLUTION INTRA-ARTICULAR; INTRALESIONAL; INTRAMUSCULAR; INTRAVENOUS; SOFT TISSUE at 14:14

## 2019-11-27 RX ADMIN — BUPIVACAINE HYDROCHLORIDE 18 ML: 5 INJECTION, SOLUTION EPIDURAL; INTRACAUDAL; PERINEURAL at 14:14

## 2019-11-27 RX ADMIN — OXYCODONE HYDROCHLORIDE 10 MG: 10 TABLET ORAL at 19:47

## 2019-11-27 RX ADMIN — SODIUM CHLORIDE, POTASSIUM CHLORIDE, SODIUM LACTATE AND CALCIUM CHLORIDE: 600; 310; 30; 20 INJECTION, SOLUTION INTRAVENOUS at 12:28

## 2019-11-27 RX ADMIN — DEXAMETHASONE SODIUM PHOSPHATE 8 MG: 4 INJECTION, SOLUTION INTRA-ARTICULAR; INTRALESIONAL; INTRAMUSCULAR; INTRAVENOUS; SOFT TISSUE at 14:35

## 2019-11-27 RX ADMIN — CLINDAMYCIN PHOSPHATE 900 MG: 150 INJECTION, SOLUTION INTRAMUSCULAR; INTRAVENOUS at 14:30

## 2019-11-27 RX ADMIN — HYDROMORPHONE HYDROCHLORIDE 1 MG: 2 INJECTION, SOLUTION INTRAMUSCULAR; INTRAVENOUS; SUBCUTANEOUS at 14:30

## 2019-11-27 RX ADMIN — ONDANSETRON 4 MG: 2 INJECTION INTRAMUSCULAR; INTRAVENOUS at 15:59

## 2019-11-27 RX ADMIN — EPHEDRINE SULFATE 10 MG: 50 INJECTION, SOLUTION INTRAVENOUS at 15:57

## 2019-11-27 RX ADMIN — FENTANYL CITRATE 50 MCG: 0.05 INJECTION, SOLUTION INTRAMUSCULAR; INTRAVENOUS at 17:13

## 2019-11-27 RX ADMIN — OXYCODONE HYDROCHLORIDE 10 MG: 5 SOLUTION ORAL at 16:39

## 2019-11-27 RX ADMIN — LIDOCAINE HYDROCHLORIDE 0.5 ML: 10 INJECTION, SOLUTION EPIDURAL; INFILTRATION; INTRACAUDAL at 12:14

## 2019-11-27 RX ADMIN — CEFAZOLIN SODIUM 2 G: 2 INJECTION, SOLUTION INTRAVENOUS at 21:26

## 2019-11-27 RX ADMIN — DOCUSATE SODIUM 100 MG: 100 CAPSULE, LIQUID FILLED ORAL at 18:36

## 2019-11-27 RX ADMIN — PROPOFOL 200 MG: 10 INJECTION, EMULSION INTRAVENOUS at 14:30

## 2019-11-27 RX ADMIN — ACETAMINOPHEN 1000 MG: 500 TABLET ORAL at 22:58

## 2019-11-27 RX ADMIN — OXYCODONE HYDROCHLORIDE 10 MG: 10 TABLET ORAL at 22:58

## 2019-11-27 RX ADMIN — HYDROMORPHONE HYDROCHLORIDE 1 MG: 2 INJECTION, SOLUTION INTRAMUSCULAR; INTRAVENOUS; SUBCUTANEOUS at 15:06

## 2019-11-27 RX ADMIN — CELECOXIB 200 MG: 200 CAPSULE ORAL at 12:14

## 2019-11-27 RX ADMIN — MIDAZOLAM 2 MG: 1 INJECTION INTRAMUSCULAR; INTRAVENOUS at 14:12

## 2019-11-27 RX ADMIN — ACETAMINOPHEN 1000 MG: 500 TABLET ORAL at 18:36

## 2019-11-27 RX ADMIN — FENTANYL CITRATE 50 MCG: 0.05 INJECTION, SOLUTION INTRAMUSCULAR; INTRAVENOUS at 17:08

## 2019-11-27 RX ADMIN — ROCURONIUM BROMIDE 80 MG: 10 INJECTION, SOLUTION INTRAVENOUS at 14:30

## 2019-11-27 RX ADMIN — ACETAMINOPHEN 1000 MG: 500 TABLET ORAL at 12:13

## 2019-11-27 RX ADMIN — EPHEDRINE SULFATE 10 MG: 50 INJECTION, SOLUTION INTRAVENOUS at 15:29

## 2019-11-27 RX ADMIN — LIDOCAINE HYDROCHLORIDE 80 MG: 20 INJECTION, SOLUTION EPIDURAL; INFILTRATION; INTRACAUDAL at 14:30

## 2019-11-27 ASSESSMENT — LIFESTYLE VARIABLES
HAVE YOU EVER FELT YOU SHOULD CUT DOWN ON YOUR DRINKING: NO
HOW MANY TIMES IN THE PAST YEAR HAVE YOU HAD 5 OR MORE DRINKS IN A DAY: 0
EVER_SMOKED: NEVER
TOTAL SCORE: 0
ALCOHOL_USE: YES
AVERAGE NUMBER OF DAYS PER WEEK YOU HAVE A DRINK CONTAINING ALCOHOL: 3
EVER HAD A DRINK FIRST THING IN THE MORNING TO STEADY YOUR NERVES TO GET RID OF A HANGOVER: NO
ON A TYPICAL DAY WHEN YOU DRINK ALCOHOL HOW MANY DRINKS DO YOU HAVE: 2
CONSUMPTION TOTAL: NEGATIVE
HAVE PEOPLE ANNOYED YOU BY CRITICIZING YOUR DRINKING: NO
TOTAL SCORE: 0
TOTAL SCORE: 0
EVER FELT BAD OR GUILTY ABOUT YOUR DRINKING: NO

## 2019-11-27 ASSESSMENT — PAIN SCALES - GENERAL: PAIN_LEVEL: 0

## 2019-11-27 ASSESSMENT — COGNITIVE AND FUNCTIONAL STATUS - GENERAL
PERSONAL GROOMING: A LITTLE
SUGGESTED CMS G CODE MODIFIER MOBILITY: CI
DAILY ACTIVITIY SCORE: 18
TOILETING: A LITTLE
DRESSING REGULAR UPPER BODY CLOTHING: A LITTLE
DRESSING REGULAR LOWER BODY CLOTHING: A LITTLE
HELP NEEDED FOR BATHING: A LITTLE
SUGGESTED CMS G CODE MODIFIER DAILY ACTIVITY: CK
MOBILITY SCORE: 23
EATING MEALS: A LITTLE
TURNING FROM BACK TO SIDE WHILE IN FLAT BAD: A LITTLE

## 2019-11-27 NOTE — ANESTHESIA PROCEDURE NOTES
Airway  Date/Time: 11/27/2019 2:31 PM  Performed by: Andrea Hyde M.D.  Authorized by: Andrea Hyde M.D.     Location:  OR  Urgency:  Elective  Difficult Airway: No    Indications for Airway Management:  Anesthesia  Spontaneous Ventilation: absent    Sedation Level:  Deep  Preoxygenated: Yes    Patient Position:  Sniffing  Mask Difficulty Assessment:  1 - vent by mask  Final Airway Type:  Endotracheal airway  Final Endotracheal Airway:  ETT  Cuffed: Yes    Technique Used for Successful ETT Placement:  Direct laryngoscopy  Insertion Site:  Oral  Blade Type:  Umberto  Laryngoscope Blade/Videolaryngoscope Blade Size:  4  ETT Size (mm):  7.0  Measured from:  Lips  ETT to Lips (cm):  24  Placement Verified by: auscultation and capnometry    Cormack-Lehane Classification:  Grade I - full view of glottis  Number of Attempts at Approach:  1

## 2019-11-27 NOTE — ANESTHESIA PROCEDURE NOTES
Peripheral Block  Date/Time: 11/27/2019 2:12 PM  Performed by: Andrea Hyde M.D.  Authorized by: Andrea Hyde M.D.     Patient Location:  Pre-op  Start Time:  11/27/2019 2:12 PM  End Time:  11/27/2019 2:15 PM  Reason for Block: at surgeon's request and post-op pain management    patient identified, IV checked, site marked, risks and benefits discussed, surgical consent, monitors and equipment checked, pre-op evaluation and timeout performed    Patient Position:  Supine  Prep: ChloraPrep    Monitoring:  Heart rate, continuous pulse ox and cardiac monitor  Block Region:  Upper Extremity  Upper Extremity - Block Type:  BRACHIAL PLEXUS block, Interscalene approach    Laterality:  Right  Procedures: ultrasound guided  Image captured, interpreted and electronically stored.  Local Infiltration:  Lidocaine  Strength:  1 %  Dose:  3 ml  Block Type:  Single-shot  Needle Length:  100mm  Needle Gauge:  21 G  Needle Localization:  Ultrasound guidance  Injection Assessment:  Negative aspiration for heme, no paresthesia on injection, incremental injection and local visualized surrounding nerve on ultrasound  Evidence of intravascular injection: No     US Guided Interscalene Brachial Plexus Block   US transducer placed on the neck in oblique plane approximately at the level of C6.  Anterior and Middle Scalene (MSM) muscles identified with nerve trunks identified between the muscles.  Needle inserted lateral to probe and advanced under direct visualization through the MSM into a perineural position.  After negative aspiration, LA injected with ease and visualized surrounding the nerve trunks.

## 2019-11-27 NOTE — ANESTHESIA PREPROCEDURE EVALUATION
AZEEM risk factors. Denies: MI/CHF/smoking/CVA/DM/CKD  Prior GA without issues    Relevant Problems   Other   (+) Synovitis of hip       Physical Exam    Airway   Mallampati: II  TM distance: >3 FB  Neck ROM: full       Cardiovascular - normal exam  Rhythm: regular  Rate: normal  (-) murmur     Dental - normal exam         Pulmonary - normal exam  Breath sounds clear to auscultation     Abdominal    Neurological - normal exam                 Anesthesia Plan    ASA 2       Plan - general and peripheral nerve block     Peripheral nerve block will be post-op pain control  Airway plan will be ETT        Induction: intravenous    Postoperative Plan: Postoperative administration of opioids is intended.    Pertinent diagnostic labs and testing reviewed    Informed Consent:    Anesthetic plan and risks discussed with patient.    Use of blood products discussed with: patient whom consented to blood products.

## 2019-11-28 ENCOUNTER — APPOINTMENT (OUTPATIENT)
Dept: RADIOLOGY | Facility: MEDICAL CENTER | Age: 59
DRG: 483 | End: 2019-11-28
Attending: PHYSICIAN ASSISTANT
Payer: COMMERCIAL

## 2019-11-28 PROCEDURE — 770001 HCHG ROOM/CARE - MED/SURG/GYN PRIV*

## 2019-11-28 PROCEDURE — A9270 NON-COVERED ITEM OR SERVICE: HCPCS | Performed by: SURGERY

## 2019-11-28 PROCEDURE — 700102 HCHG RX REV CODE 250 W/ 637 OVERRIDE(OP): Performed by: SURGERY

## 2019-11-28 PROCEDURE — 700111 HCHG RX REV CODE 636 W/ 250 OVERRIDE (IP): Performed by: SURGERY

## 2019-11-28 PROCEDURE — 71046 X-RAY EXAM CHEST 2 VIEWS: CPT

## 2019-11-28 RX ADMIN — OXYCODONE HYDROCHLORIDE 10 MG: 10 TABLET ORAL at 12:06

## 2019-11-28 RX ADMIN — ACETAMINOPHEN 1000 MG: 500 TABLET ORAL at 18:23

## 2019-11-28 RX ADMIN — OXYCODONE HYDROCHLORIDE 10 MG: 10 TABLET ORAL at 01:58

## 2019-11-28 RX ADMIN — OXYCODONE HYDROCHLORIDE 10 MG: 10 TABLET ORAL at 21:22

## 2019-11-28 RX ADMIN — ACETAMINOPHEN 1000 MG: 500 TABLET ORAL at 12:05

## 2019-11-28 RX ADMIN — CELECOXIB 200 MG: 200 CAPSULE ORAL at 05:33

## 2019-11-28 RX ADMIN — OXYCODONE HYDROCHLORIDE 10 MG: 10 TABLET ORAL at 18:23

## 2019-11-28 RX ADMIN — ACETAMINOPHEN 1000 MG: 500 TABLET ORAL at 05:33

## 2019-11-28 RX ADMIN — OXYCODONE HYDROCHLORIDE 10 MG: 10 TABLET ORAL at 05:33

## 2019-11-28 RX ADMIN — CELECOXIB 200 MG: 200 CAPSULE ORAL at 18:23

## 2019-11-28 RX ADMIN — CEFAZOLIN SODIUM 2 G: 2 INJECTION, SOLUTION INTRAVENOUS at 05:33

## 2019-11-28 RX ADMIN — OXYCODONE HYDROCHLORIDE 10 MG: 10 TABLET ORAL at 15:16

## 2019-11-28 RX ADMIN — OXYCODONE HYDROCHLORIDE 10 MG: 10 TABLET ORAL at 09:02

## 2019-11-28 NOTE — PROGRESS NOTES
2 RN skin check completed with SUGEY Colin  Devices in place Sling in RUE, SCD, oxymask.  Skin assessed under devices :Yes.  Confirmed pressure ulcers found on: N/A.  New potential pressure ulcers noted on : N/A.   Pt. Has old scar in LUE from a L total shoulder arthroplasty  Pt. Sacrum is pink and blanching  Surgical site has dressing that's CD & I   Pt. Is educated about repositioning by self in bed, using moisturizer

## 2019-11-28 NOTE — ANESTHESIA QCDR
2019 [facility] Clinical Data Registry (for Quality Improvement)     Postoperative nausea/vomiting risk protocol (Adult = 18 yrs and Pediatric 3-17 yrs)- (430 and 463)  General inhalation anesthetic (NOT TIVA) with PONV risk factors: Yes  Provision of anti-emetic therapy with at least 2 different classes of agents: Yes   Patient DID NOT receive anti-emetic therapy and reason is documented in Medical Record:  N/A    Multimodal Pain Management- (AQI59)  Patient undergoing Elective Surgery (i.e. Outpatient, or ASC, or Prescheduled Surgery prior to Hospital Admission): Yes  Use of Multimodal Pain Management, two or more drugs and/or interventions, NOT including systemic opioids: Yes   Exception: Documented allergy to multiple classes of analgesics:  N/A    PACU assessment of acute postoperative pain prior to Anesthesia Care End- Applies to Patients Age = 18- (ABG7)  Initial PACU pain score is which of the following: < 7/10  Patient unable to report pain score: N/A    Post-anesthetic transfer of care checklist/protocol to PACU/ICU- (426 and 427)  Upon conclusion of case, patient transferred to which of the following locations: PACU/Non-ICU  Use of transfer checklist/protocol: Yes  Exclusion: Service Performed in Patient Hospital Room (and thus did not require transfer): N/A    PACU Reintubation- (AQI31)  General anesthesia requiring endotracheal intubation (ETT) along with subsequent extubation in OR or PACU: Yes  Required reintubation in the PACU: No   Extubation was a planned trial documented in the medical record prior to removal of the original airway device:  N/A    Unplanned admission to ICU related to anesthesia service up through end of PACU care- (MD51)  Unplanned admission to ICU (not initially anticipated at anesthesia start time): No

## 2019-11-28 NOTE — CARE PLAN
Problem: Communication  Goal: The ability to communicate needs accurately and effectively will improve  Outcome: PROGRESSING AS EXPECTED  Intervention: Educate patient and significant other/support system about the plan of care, procedures, treatments, medications and allow for questions  Note:   Pt updated in regards to plan of care including medications to be administered. Answered patients questions accordingly, he verbalizes understanding.      Problem: Pain Management  Goal: Pain level will decrease to patient's comfort goal  Outcome: PROGRESSING AS EXPECTED  Intervention: Follow pain managment plan developed in collaboration with patient and Interdisciplinary Team  Note:   Patient aware of pharmacological interventions available, verbalizes understanding. Education about non-pharmacological interventions provided, patient verbalizes understanding.

## 2019-11-28 NOTE — ANESTHESIA TIME REPORT
Anesthesia Start and Stop Event Times     Date Time Event    11/27/2019 1406 Ready for Procedure     1426 Anesthesia Start     1627 Anesthesia Stop        Responsible Staff  11/27/19    Name Role Begin End    Andrea Hyde M.D. Anesth 1426 1627        Preop Diagnosis (Free Text):  Pre-op Diagnosis     PAIN IN RIGHT SHOULDER        Preop Diagnosis (Codes):    Post op Diagnosis  Osteoarthritis of right shoulder  carpal tunnel (right)    Premium Reason  A. 3PM - 7AM    Comments:

## 2019-11-28 NOTE — CARE PLAN
Problem: Communication  Goal: The ability to communicate needs accurately and effectively will improve  Outcome: PROGRESSING AS EXPECTED     Problem: Safety  Goal: Will remain free from injury  Outcome: PROGRESSING AS EXPECTED  Goal: Will remain free from falls  Outcome: PROGRESSING AS EXPECTED  Note:   Bed locked in lowest position with two side rails up     Problem: Discharge Barriers/Planning  Goal: Patient's continuum of care needs will be met  Outcome: PROGRESSING AS EXPECTED     Problem: Pain Management  Goal: Pain level will decrease to patient's comfort goal  Outcome: PROGRESSING AS EXPECTED

## 2019-11-28 NOTE — PROGRESS NOTES
"   Orthopaedic PA Progress Note    Interval changes:Doing well, plan to DC tomorrow    ROS - Patient denies any new issues. No chest pain, dyspnea, or fever.  Pain well controlled.    /72   Pulse 84   Temp 36.8 °C (98.2 °F) (Temporal)   Resp 16   Ht 1.88 m (6' 2\")   Wt 114.5 kg (252 lb 6.8 oz)   SpO2 93%     Patient seen and examined  No acute distress  Breathing non labored  RRR  Surgical dressing is clean, dry, and intact. Patient clearly fires forearm flexors, forearm extensors, and moves all five fingers without issue . Sensation is intact to light touch throughout median, ulnar, and radial nerve distributions. Strong and palpable radial pulses with capillary refill less than 2 seconds. No arm or hand discomfort.    Assessment/Plan:  POD#1 S/P Right TSA  Wt bearing status - NWB RUE  PT/OT-initiated  Wound care:dressing left in place  Drains - left in place, will check early this afternnon  Fontana-no  Sutures/Staples out- 10-14 days post operatively  Antibiotics: complete  DVT Prophylaxis- TEDS/SCDs/Foot pumps.   Case Coordination for Discharge Planning - Disposition Home tomorrow, no anticipated DME/HH needs        "

## 2019-11-28 NOTE — OR NURSING
Pt recovering well at this time. Having slight pain that is being managed with medication and nonpharmacologic intervention. Friend called and updated on plan of care and room assignment. X-ray present at bedside. Pt A&Ox4

## 2019-11-29 VITALS
SYSTOLIC BLOOD PRESSURE: 115 MMHG | TEMPERATURE: 98.5 F | WEIGHT: 252.43 LBS | HEART RATE: 79 BPM | OXYGEN SATURATION: 91 % | DIASTOLIC BLOOD PRESSURE: 56 MMHG | HEIGHT: 74 IN | RESPIRATION RATE: 16 BRPM | BODY MASS INDEX: 32.4 KG/M2

## 2019-11-29 PROCEDURE — 700102 HCHG RX REV CODE 250 W/ 637 OVERRIDE(OP): Performed by: SURGERY

## 2019-11-29 PROCEDURE — A9270 NON-COVERED ITEM OR SERVICE: HCPCS | Performed by: SURGERY

## 2019-11-29 RX ADMIN — CELECOXIB 200 MG: 200 CAPSULE ORAL at 04:58

## 2019-11-29 RX ADMIN — OXYCODONE HYDROCHLORIDE 10 MG: 10 TABLET ORAL at 15:52

## 2019-11-29 RX ADMIN — OXYCODONE HYDROCHLORIDE 10 MG: 10 TABLET ORAL at 12:43

## 2019-11-29 RX ADMIN — ACETAMINOPHEN 1000 MG: 500 TABLET ORAL at 04:58

## 2019-11-29 RX ADMIN — ACETAMINOPHEN 1000 MG: 500 TABLET ORAL at 12:42

## 2019-11-29 RX ADMIN — ACETAMINOPHEN 1000 MG: 500 TABLET ORAL at 00:24

## 2019-11-29 RX ADMIN — OXYCODONE HYDROCHLORIDE 10 MG: 10 TABLET ORAL at 00:24

## 2019-11-29 RX ADMIN — OXYCODONE HYDROCHLORIDE 10 MG: 10 TABLET ORAL at 09:49

## 2019-11-29 RX ADMIN — OXYCODONE HYDROCHLORIDE 10 MG: 10 TABLET ORAL at 03:57

## 2019-11-29 RX ADMIN — OXYCODONE HYDROCHLORIDE 10 MG: 10 TABLET ORAL at 06:44

## 2019-11-29 NOTE — OP REPORT
DATE OF SERVICE:  11/27/2019    SURGEON:  Denzel Veliz MD.    ASSISTANT:  Torrey Grullon PA-C.    PREOPERATIVE DIAGNOSES:  1.  Right carpal tunnel syndrome.  2.  Right shoulder osteoarthritis.  3.  Right biceps tendinopathy.    POSTOPERATIVE DIAGNOSES:    1.  Right carpal tunnel syndrome.  2.  Right shoulder osteoarthritis.    PROCEDURES:  1.  Right open carpal tunnel release.  2.  Right anatomic total shoulder arthroplasty.  3.  Right open biceps tenodesis.    IMPLANTS:  Integra total shoulder arthroplasty system, size 15 pressfit stem   standard body size 48x18 mm eccentric humeral head and size medium cemented   polyethylene.    ANESTHESIOLOGIST:  Andrea Hyde MD    ANESTHESIA:  General with upper extremity nerve block for pain control.    COMPLICATIONS:  None noted.    DRAINS:  Hemovac x1.    SPECIMENS:  None.    ESTIMATED BLOOD LOSS:  150 mL.    INDICATIONS:  The patient is a 59-year-old gentleman well-known to me through   my outpatient clinic for the above-mentioned diagnosis.  He believes and   agrees he has failed conservative treatment and is a candidate for the   above-mentioned procedure.  He has had a left total shoulder arthroplasty from   which he is very satisfied.  We discussed continued conservative versus   operative treatment of his right shoulder arthritis, labral tearing.  After   discussing the advantages and disadvantages of each, he elects to proceed with   the above-mentioned procedure.  Prior to the procedure, he understood the   risks, benefits and alternatives to surgery.  He understood the risks to   include, but not be limited to, the risk of infection requiring repeat   surgery, bleeding requiring blood transfusion, nerve, blood vessel, tendon   injury requiring repair, chronic pain, failure to alleviate or worsening of   symptoms requiring revision surgery, periprosthetic fracture subsidence,   loosening or dislocation requiring revision surgery, DVT,  pulmonary embolism,   heart attack, stroke, and even death.  The patient states despite these risks,   he wished to proceed with surgery.    DESCRIPTION OF PROCEDURE:  The patient was met in the preoperative holding   area.  His right upper extremity was initialed as a correct operative site.    He had an opportunity to ask questions, all questions were answered and   informed consent was obtained.      The patient was brought to the operating room and laid supine on the operating   room table.  All bony and dependent prominences were well padded.  Upper   extremity nerve block and general anesthesia were induced without   complication.  Ancef and clindamycin were administered for infection   prophylaxis and tranexamic acid for hemostasis.  He was placed in well-padded   beach chair position.  The right upper extremity was prepped and draped in the   usual sterile fashion.  A formal time-out was performed, in which all parties   agreed upon the correct patient, procedure, and operative site.  I began the   procedure by using Esmarch to exsanguinate the extremity and inflated the   tourniquet to 250 mmHg.  I then made approximately 4 cm longitudinal incision   in line with his ring finger over the carpal tunnel.  I dissected down through   the palmar aponeurosis, identified the transverse carpal ligament which I   released longitudinally in its entirety under direct visualization as well as   distal forearm antebrachial fascia with tenotomy scissors and after deflated   the tourniquet, copiously irrigated the wound with normal saline, closed the   carpal tunnel incision with 4-0 nylon suture, placed back into the sterile   stockinette and Coban.  I turned my attention to the shoulder, made an oblique   incision over the anterior aspect of the shoulder for deltopectoral approach,   identified cephalic vein, retracted medially, cauterized lateral branches.  I   then released the upper third pectoralis major tendon to  help mobilize the   shoulder.  Deep to this, I identified the long head of the biceps tendon,   which was tendinopathic to prevent pain from tendinopathy and subluxation of   the prosthesis.  I performed an open biceps tenodesis, tenodesing the tendon   at resting tension of the upper half of pectoralis major tendon with 0 Vicryl   suture and then released the tendon and excised the proximal tendon out of its   groove and off the superior aspect of the glenoid.  I palpated and protected   axillary nerve and performed a longitudinal tenotomy of subscapularis tendon,   tagging the medial side for later repair.  I mobilized the medial side.  I   then dislocated the shoulder, performed an anatomic neck cut at 30 degrees   retroversion.  He had medial and inferior approximately 2x3cm area of full   thickness cartilage loss and bone-on-bone arthritis with severe labral   degenerative changes. I placed the cup protector and glenoid retractors while   palpating and protecting the axillary nerve.  I circumferentially excised the   labrum and trialed polyethylene for size medium, which had excellent coverage.    Placed the central guide, drilled for the central peg and the peripheral   pegs.  I then reamed, I then cemented the size medium polyethylene into the   glenoid, which had excellent fixation and was well centered.  I turned my   attention back to the humerus, broached up to a size 15, which had excellent   pressfit, placed this with standard body trial heads 48x18 mm eccentric head   had the best coverage of the cut and good range of motion with good posterior   translation, placed the final component.  There was again excellent range of   motion, 50% posterior translation.  I then copiously irrigated the joint with   normal saline and dilute Betadine, repaired subscapularis with #5 Tycron   suture with the arm in 30 degrees of external rotation.  I then placed 1 deep   Hemovac drain and closed the skin with 2-0  Monocryl suture, skin stapler,   covered with sterile Xeroform, 4x4s, Ioban and sling.  I also covered the   carpal tunnel with a well-padded splint.  The patient awoke from anesthesia   without known complication, was transferred in a stable condition to PACU   where there were no immediate post-term complaints.      POSTOPERATIVE PLAN:  Patient will be admitted overnight for pain control,   likely discharge home on postoperative day #1.       ____________________________________     MD RAHEEL Bull / CHRISTEN    DD:  11/29/2019 12:12:11  DT:  11/29/2019 12:49:56    D#:  [id number]  Job#:  [id number]

## 2019-11-29 NOTE — CARE PLAN
Problem: Communication  Goal: The ability to communicate needs accurately and effectively will improve  Outcome: PROGRESSING AS EXPECTED   Call light and personal belongings within reach. Patient encouraged to use call light to alert staff of needs. Patient verbalized understanding. Hourly rounding in place.     Problem: Safety  Goal: Will remain free from falls  Outcome: PROGRESSING AS EXPECTED   Fall precautions in place.     Problem: Pain Management  Goal: Pain level will decrease to patient's comfort goal  Outcome: PROGRESSING AS EXPECTED   Patient rates pain using 0-10 pain rating scale. Patient requesting PRN pain medication be given to him every 3 hours, even if he is sleeping.

## 2019-11-29 NOTE — DISCHARGE INSTRUCTIONS
Dressing can be removed on Monday. And you can shower then.    Discharge Instructions    Discharged to home by car with relative. Discharged via walking, hospital escort: Refused.  Special equipment needed: sling    Be sure to schedule a follow-up appointment with your primary care doctor or any specialists as instructed.     Discharge Plan:   Influenza Vaccine Indication: Patient Refuses    I understand that a diet low in cholesterol, fat, and sodium is recommended for good health. Unless I have been given specific instructions below for another diet, I accept this instruction as my diet prescription.   Other diet: regular    Special Instructions: Discharge instructions for the Orthopedic Patient    Follow up with Primary Care Physician within 2 weeks of discharge to home, regarding:  Review of medications and diagnostic testing.  Surveillance for medical complications.  Workup and treatment of osteoporosis, if appropriate.     -Is this a Joint Replacement patient? No    -Is this patient being discharged with medication to prevent blood clots?  No    · Is patient discharged on Warfarin / Coumadin?   No     Depression / Suicide Risk    As you are discharged from this [facility] facility, it is important to learn how to keep safe from harming yourself.    Recognize the warning signs:  · Abrupt changes in personality, positive or negative- including increase in energy   · Giving away possessions  · Change in eating patterns- significant weight changes-  positive or negative  · Change in sleeping patterns- unable to sleep or sleeping all the time   · Unwillingness or inability to communicate  · Depression  · Unusual sadness, discouragement and loneliness  · Talk of wanting to die  · Neglect of personal appearance   · Rebelliousness- reckless behavior  · Withdrawal from people/activities they love  · Confusion- inability to concentrate     If you or a loved one observes any of these behaviors or has concerns about  self-harm, here's what you can do:  · Talk about it- your feelings and reasons for harming yourself  · Remove any means that you might use to hurt yourself (examples: pills, rope, extension cords, firearm)  · Get professional help from the community (Mental Health, Substance Abuse, psychological counseling)  · Do not be alone:Call your Safe Contact- someone whom you trust who will be there for you.  · Call your local CRISIS HOTLINE 006-0833 or 886-234-5735  · Call your local Children's Mobile Crisis Response Team [place] (654) 751-1035 or www.Ifbyphone  · Call the toll free National Suicide Prevention Hotlines   · National Suicide Prevention Lifeline 447-038-VBYS (1128)  · National Hope Line Network 800-SUICIDE (595-1612)

## 2019-11-29 NOTE — PROGRESS NOTES
Patient left in good condition. AVS printed. Copy given to patient and signed copy placed in chart. All questions answered. IV removed. Patient walked out to car with relative. Refused escort.

## 2019-11-29 NOTE — DISCHARGE SUMMARY
DISCHARGE SUMMARY    PATIENTS NAME: Samir Godinez    MRN: [id number]  CSN: [id number]    ADMIT DATE:  11/27/2019  ADMIT MD: Denzel Veliz M.D.    DISCHARGE DATE: 11/29/2019  DISCHARGE DIAGNOSIS: Status post right total shoulder arthroplasty  DISCHARGE MD: Denzel Veliz M.D.    REASON FOR ADMISSION:elective Right shoulder arthroplasty    PRINCIPAL DIAGNOSIS:severe osteoarthritis right shoulder    SECONDARY DIAGNOSIS:none    PROCEDURES: Right Total shoulder Arthroplasty  Denzel Veliz M.D.    CONSULTATIONS: Denzel Veliz M.D.     HOSPITAL COURSE: Patient is a pleasant 59 year old male admitted for elective shoulder replacement for severe arthritis not responding to conservative medical care. He  Underwent contralateral shoulder arthroplasty several months ago and is now ready for treatment of the other shoulder. Course of care was discussed with Dr. Veliz who recommended surgery. After explaining the indications, risks, benefits, and alternatives the patient wished to proceed with surgery. The patient was admitted and taken to the OR for the above mentioned procedure.  There were no complications and minimal blood loss. Samir Godinez has done well with mobilization and pain has been well controlled with oral medications. Wound care instructions were given, patient's questions answered, and Samir Godinez is ready for discharge to home at this time.     DISCHARGE LOCATION: [place], [place]    DVT PROPHYLAXIS:completed  ANTIBIOTICS:completed  MEDICATIONS:   Current Outpatient Medications   Medication Sig Dispense Refill   • oxyCODONE immediate-release (ROXICODONE) 5 MG Tab Take 1 Tab by mouth every four hours as needed for up to 7 days. 30 Tab 0     WEIGHT BEARING STATUS:No weight bearing right upper extremity    FOLLOW UP: 10-14 days post operatively with Dr. Denzel Veliz M.D.

## 2019-11-29 NOTE — DISCHARGE PLANNING
Anticipated Discharge Disposition: Home    Action: Pt to discharge home, no needs.    Barriers to Discharge: Medical Clearance    Plan: F/U with medical team, pt      Care Transition Team Assessment    Information Source  Orientation : Oriented x 4  Information Given By: Patient  Who is responsible for making decisions for patient? : Patient    Readmission Evaluation  Is this a readmission?: No    Elopement Risk  Legal Hold: No  Ambulatory or Self Mobile in Wheelchair: Yes  Disoriented: No  Psychiatric Symptoms: None  History of Wandering: No  Elopement this Admit: No  Vocalizing Wanting to Leave: No  Displays Behaviors, Body Language Wanting to Leave: No-Not at Risk for Elopement  Elopement Risk: Not at Risk for Elopement    Interdisciplinary Discharge Planning  Patient or legal guardian wants to designate a caregiver (see row info): No    Vision / Hearing Impairment  Right Eye Vision: Impaired, Wears Glasses  Left Eye Vision: Impaired, Wears Glasses    Advance Directive  Advance Directive?: None    Domestic Abuse  Have you ever been the victim of abuse or violence?: No  Physical Abuse or Sexual Abuse: No  Verbal Abuse or Emotional Abuse: No  Possible Abuse Reported to:: Not Applicable    Discharge Risks or Barriers  Discharge risks or barriers?: No    Anticipated Discharge Information  Anticipated discharge disposition: Home  Discharge Address: On File  Discharge Contact Phone Number: On File

## 2019-11-29 NOTE — CARE PLAN
Problem: Communication  Goal: The ability to communicate needs accurately and effectively will improve  Outcome: PROGRESSING AS EXPECTED     Problem: Safety  Goal: Will remain free from injury  Outcome: PROGRESSING AS EXPECTED  Goal: Will remain free from falls  Outcome: PROGRESSING AS EXPECTED  Note:   Bed locked in lowest position with two side rails up     Problem: Discharge Barriers/Planning  Goal: Patient's continuum of care needs will be met  Outcome: PROGRESSING AS EXPECTED  Note:   Waiting for discharge order     Problem: Pain Management  Goal: Pain level will decrease to patient's comfort goal  Outcome: PROGRESSING AS EXPECTED  Note:   PRN pain medication available every 3 hours

## 2020-01-03 ENCOUNTER — HOSPITAL ENCOUNTER (OUTPATIENT)
Dept: LAB | Facility: MEDICAL CENTER | Age: 60
End: 2020-01-03
Attending: EMERGENCY MEDICINE
Payer: COMMERCIAL

## 2020-01-03 LAB
25(OH)D3 SERPL-MCNC: 34 NG/ML (ref 30–100)
ALBUMIN SERPL BCP-MCNC: 4.3 G/DL (ref 3.2–4.9)
ALBUMIN/GLOB SERPL: 1.8 G/DL
ALP SERPL-CCNC: 72 U/L (ref 30–99)
ALT SERPL-CCNC: 19 U/L (ref 2–50)
ANION GAP SERPL CALC-SCNC: 9 MMOL/L (ref 0–11.9)
APPEARANCE UR: CLEAR
AST SERPL-CCNC: 20 U/L (ref 12–45)
BASOPHILS # BLD AUTO: 0.8 % (ref 0–1.8)
BASOPHILS # BLD: 0.05 K/UL (ref 0–0.12)
BILIRUB SERPL-MCNC: 0.5 MG/DL (ref 0.1–1.5)
BILIRUB UR QL STRIP.AUTO: NEGATIVE
BUN SERPL-MCNC: 20 MG/DL (ref 8–22)
CALCIUM SERPL-MCNC: 9.2 MG/DL (ref 8.5–10.5)
CHLORIDE SERPL-SCNC: 107 MMOL/L (ref 96–112)
CHOLEST SERPL-MCNC: 177 MG/DL (ref 100–199)
CO2 SERPL-SCNC: 24 MMOL/L (ref 20–33)
COLOR UR: YELLOW
CORTIS SERPL-MCNC: 6.5 UG/DL (ref 0–23)
CREAT SERPL-MCNC: 0.83 MG/DL (ref 0.5–1.4)
CRP SERPL HS-MCNC: 2.5 MG/L (ref 0–7.5)
DHEA-S SERPL-MCNC: 51.6 UG/DL (ref 51.7–295)
EOSINOPHIL # BLD AUTO: 0.07 K/UL (ref 0–0.51)
EOSINOPHIL NFR BLD: 1.1 % (ref 0–6.9)
ERYTHROCYTE [DISTWIDTH] IN BLOOD BY AUTOMATED COUNT: 42.5 FL (ref 35.9–50)
ESTRADIOL SERPL-MCNC: 43 PG/ML
FASTING STATUS PATIENT QL REPORTED: NORMAL
GGT SERPL-CCNC: 47 U/L (ref 7–51)
GLOBULIN SER CALC-MCNC: 2.4 G/DL (ref 1.9–3.5)
GLUCOSE SERPL-MCNC: 108 MG/DL (ref 65–99)
GLUCOSE UR STRIP.AUTO-MCNC: NEGATIVE MG/DL
HCT VFR BLD AUTO: 42.8 % (ref 42–52)
HCYS SERPL-SCNC: 11.78 UMOL/L
HDLC SERPL-MCNC: 49 MG/DL
HGB BLD-MCNC: 14.2 G/DL (ref 14–18)
IMM GRANULOCYTES # BLD AUTO: 0.04 K/UL (ref 0–0.11)
IMM GRANULOCYTES NFR BLD AUTO: 0.6 % (ref 0–0.9)
KETONES UR STRIP.AUTO-MCNC: NEGATIVE MG/DL
LDH SERPL L TO P-CCNC: 154 U/L (ref 107–266)
LDLC SERPL CALC-MCNC: 114 MG/DL
LEUKOCYTE ESTERASE UR QL STRIP.AUTO: NEGATIVE
LYMPHOCYTES # BLD AUTO: 1.66 K/UL (ref 1–4.8)
LYMPHOCYTES NFR BLD: 26.2 % (ref 22–41)
MCH RBC QN AUTO: 28.3 PG (ref 27–33)
MCHC RBC AUTO-ENTMCNC: 33.2 G/DL (ref 33.7–35.3)
MCV RBC AUTO: 85.4 FL (ref 81.4–97.8)
MICRO URNS: NORMAL
MONOCYTES # BLD AUTO: 0.52 K/UL (ref 0–0.85)
MONOCYTES NFR BLD AUTO: 8.2 % (ref 0–13.4)
NEUTROPHILS # BLD AUTO: 3.99 K/UL (ref 1.82–7.42)
NEUTROPHILS NFR BLD: 63.1 % (ref 44–72)
NITRITE UR QL STRIP.AUTO: NEGATIVE
NRBC # BLD AUTO: 0 K/UL
NRBC BLD-RTO: 0 /100 WBC
PH UR STRIP.AUTO: 6.5 [PH] (ref 5–8)
PHOSPHATE SERPL-MCNC: 3.5 MG/DL (ref 2.5–4.5)
PLATELET # BLD AUTO: 214 K/UL (ref 164–446)
PMV BLD AUTO: 9.9 FL (ref 9–12.9)
POTASSIUM SERPL-SCNC: 4 MMOL/L (ref 3.6–5.5)
PROT SERPL-MCNC: 6.7 G/DL (ref 6–8.2)
PROT UR QL STRIP: NEGATIVE MG/DL
PSA SERPL-MCNC: 1.68 NG/ML (ref 0–4)
RBC # BLD AUTO: 5.01 M/UL (ref 4.7–6.1)
RBC UR QL AUTO: NEGATIVE
SODIUM SERPL-SCNC: 140 MMOL/L (ref 135–145)
SP GR UR STRIP.AUTO: 1.02
T3FREE SERPL-MCNC: 3.39 PG/ML (ref 2.4–4.2)
T4 FREE SERPL-MCNC: 0.68 NG/DL (ref 0.53–1.43)
TRIGL SERPL-MCNC: 72 MG/DL (ref 0–149)
TSH SERPL DL<=0.005 MIU/L-ACNC: 3.02 UIU/ML (ref 0.38–5.33)
URATE SERPL-MCNC: 4.7 MG/DL (ref 2.5–8.3)
UROBILINOGEN UR STRIP.AUTO-MCNC: 0.2 MG/DL
WBC # BLD AUTO: 6.3 K/UL (ref 4.8–10.8)

## 2020-01-03 PROCEDURE — 84100 ASSAY OF PHOSPHORUS: CPT

## 2020-01-03 PROCEDURE — 86141 C-REACTIVE PROTEIN HS: CPT

## 2020-01-03 PROCEDURE — 84403 ASSAY OF TOTAL TESTOSTERONE: CPT

## 2020-01-03 PROCEDURE — 84443 ASSAY THYROID STIM HORMONE: CPT

## 2020-01-03 PROCEDURE — 84550 ASSAY OF BLOOD/URIC ACID: CPT

## 2020-01-03 PROCEDURE — 82306 VITAMIN D 25 HYDROXY: CPT

## 2020-01-03 PROCEDURE — 82533 TOTAL CORTISOL: CPT

## 2020-01-03 PROCEDURE — 80061 LIPID PANEL: CPT

## 2020-01-03 PROCEDURE — 83036 HEMOGLOBIN GLYCOSYLATED A1C: CPT

## 2020-01-03 PROCEDURE — 85025 COMPLETE CBC W/AUTO DIFF WBC: CPT

## 2020-01-03 PROCEDURE — 84481 FREE ASSAY (FT-3): CPT

## 2020-01-03 PROCEDURE — 82670 ASSAY OF TOTAL ESTRADIOL: CPT

## 2020-01-03 PROCEDURE — 82627 DEHYDROEPIANDROSTERONE: CPT

## 2020-01-03 PROCEDURE — 80053 COMPREHEN METABOLIC PANEL: CPT

## 2020-01-03 PROCEDURE — 84402 ASSAY OF FREE TESTOSTERONE: CPT

## 2020-01-03 PROCEDURE — 84439 ASSAY OF FREE THYROXINE: CPT

## 2020-01-03 PROCEDURE — 36415 COLL VENOUS BLD VENIPUNCTURE: CPT

## 2020-01-03 PROCEDURE — 83615 LACTATE (LD) (LDH) ENZYME: CPT

## 2020-01-03 PROCEDURE — 82977 ASSAY OF GGT: CPT

## 2020-01-03 PROCEDURE — 83090 ASSAY OF HOMOCYSTEINE: CPT

## 2020-01-03 PROCEDURE — 83525 ASSAY OF INSULIN: CPT

## 2020-01-03 PROCEDURE — 84305 ASSAY OF SOMATOMEDIN: CPT

## 2020-01-03 PROCEDURE — 84270 ASSAY OF SEX HORMONE GLOBUL: CPT

## 2020-01-03 PROCEDURE — 81003 URINALYSIS AUTO W/O SCOPE: CPT

## 2020-01-03 PROCEDURE — 84153 ASSAY OF PSA TOTAL: CPT

## 2020-01-04 LAB
EST. AVERAGE GLUCOSE BLD GHB EST-MCNC: 117 MG/DL
HBA1C MFR BLD: 5.7 % (ref 0–5.6)
INSULIN P FAST SERPL-ACNC: 9 UIU/ML (ref 3–19)
SHBG SERPL-SCNC: 48 NMOL/L (ref 11–80)
TESTOST FREE MFR SERPL: 1.5 % (ref 1.6–2.9)
TESTOST FREE SERPL-MCNC: 66 PG/ML (ref 47–244)
TESTOST SERPL-MCNC: 440 NG/DL (ref 300–890)

## 2020-01-05 LAB
IGF-I SERPL-MCNC: 240 NG/ML (ref 55–203)
IGF-I Z-SCORE SERPL: 2.3

## 2020-05-11 ENCOUNTER — HOSPITAL ENCOUNTER (OUTPATIENT)
Dept: LAB | Facility: MEDICAL CENTER | Age: 60
End: 2020-05-11
Attending: EMERGENCY MEDICINE
Payer: COMMERCIAL

## 2020-05-11 ENCOUNTER — HOSPITAL ENCOUNTER (OUTPATIENT)
Dept: LAB | Facility: MEDICAL CENTER | Age: 60
End: 2020-05-11
Attending: FAMILY MEDICINE
Payer: COMMERCIAL

## 2020-05-11 LAB
BASOPHILS # BLD AUTO: 1.3 % (ref 0–1.8)
BASOPHILS # BLD: 0.1 K/UL (ref 0–0.12)
EOSINOPHIL # BLD AUTO: 0.5 K/UL (ref 0–0.51)
EOSINOPHIL NFR BLD: 6.4 % (ref 0–6.9)
ERYTHROCYTE [DISTWIDTH] IN BLOOD BY AUTOMATED COUNT: 50.7 FL (ref 35.9–50)
ESTRADIOL SERPL-MCNC: 90 PG/ML
HCT VFR BLD AUTO: 46.9 % (ref 42–52)
HGB BLD-MCNC: 15.8 G/DL (ref 14–18)
IMM GRANULOCYTES # BLD AUTO: 0.07 K/UL (ref 0–0.11)
IMM GRANULOCYTES NFR BLD AUTO: 0.9 % (ref 0–0.9)
LYMPHOCYTES # BLD AUTO: 1.62 K/UL (ref 1–4.8)
LYMPHOCYTES NFR BLD: 20.9 % (ref 22–41)
MCH RBC QN AUTO: 29.4 PG (ref 27–33)
MCHC RBC AUTO-ENTMCNC: 33.7 G/DL (ref 33.7–35.3)
MCV RBC AUTO: 87.2 FL (ref 81.4–97.8)
MONOCYTES # BLD AUTO: 0.76 K/UL (ref 0–0.85)
MONOCYTES NFR BLD AUTO: 9.8 % (ref 0–13.4)
NEUTROPHILS # BLD AUTO: 4.71 K/UL (ref 1.82–7.42)
NEUTROPHILS NFR BLD: 60.7 % (ref 44–72)
NRBC # BLD AUTO: 0 K/UL
NRBC BLD-RTO: 0 /100 WBC
PLATELET # BLD AUTO: 249 K/UL (ref 164–446)
PMV BLD AUTO: 10.4 FL (ref 9–12.9)
RBC # BLD AUTO: 5.38 M/UL (ref 4.7–6.1)
WBC # BLD AUTO: 7.8 K/UL (ref 4.8–10.8)

## 2020-05-11 PROCEDURE — 36415 COLL VENOUS BLD VENIPUNCTURE: CPT

## 2020-05-11 PROCEDURE — 85025 COMPLETE CBC W/AUTO DIFF WBC: CPT

## 2020-05-11 PROCEDURE — 84403 ASSAY OF TOTAL TESTOSTERONE: CPT

## 2020-05-11 PROCEDURE — 84270 ASSAY OF SEX HORMONE GLOBUL: CPT

## 2020-05-11 PROCEDURE — 82670 ASSAY OF TOTAL ESTRADIOL: CPT

## 2020-05-11 PROCEDURE — 86769 SARS-COV-2 COVID-19 ANTIBODY: CPT

## 2020-05-11 PROCEDURE — 84402 ASSAY OF FREE TESTOSTERONE: CPT

## 2020-05-13 LAB
SHBG SERPL-SCNC: 21 NMOL/L (ref 11–80)
TEST NAME 95000: ABNORMAL
TESTOST FREE MFR SERPL: ABNORMAL % (ref 1.6–2.9)
TESTOST FREE SERPL-MCNC: ABNORMAL PG/ML (ref 47–244)
TESTOST SERPL-MCNC: >1500 NG/DL (ref 300–720)

## 2020-09-08 ENCOUNTER — HOSPITAL ENCOUNTER (OUTPATIENT)
Dept: RADIOLOGY | Facility: MEDICAL CENTER | Age: 60
End: 2020-09-08
Attending: PHYSICIAN ASSISTANT
Payer: COMMERCIAL

## 2020-09-08 DIAGNOSIS — G89.29 CHRONIC RIGHT SHOULDER PAIN: ICD-10-CM

## 2020-09-08 DIAGNOSIS — M25.511 CHRONIC RIGHT SHOULDER PAIN: ICD-10-CM

## 2020-09-08 PROCEDURE — 73200 CT UPPER EXTREMITY W/O DYE: CPT | Mod: RT

## 2020-10-13 ENCOUNTER — PRE-ADMISSION TESTING (OUTPATIENT)
Dept: ADMISSIONS | Facility: MEDICAL CENTER | Age: 60
DRG: 468 | End: 2020-10-13
Attending: ORTHOPAEDIC SURGERY
Payer: COMMERCIAL

## 2020-10-13 DIAGNOSIS — Z01.810 PRE-OPERATIVE CARDIOVASCULAR EXAMINATION: ICD-10-CM

## 2020-10-13 DIAGNOSIS — Z01.812 PRE-OPERATIVE LABORATORY EXAMINATION: ICD-10-CM

## 2020-10-13 LAB
ANION GAP SERPL CALC-SCNC: 8 MMOL/L (ref 7–16)
APTT PPP: 27.2 SEC (ref 24.7–36)
BUN SERPL-MCNC: 18 MG/DL (ref 8–22)
CALCIUM SERPL-MCNC: 8.8 MG/DL (ref 8.4–10.2)
CHLORIDE SERPL-SCNC: 109 MMOL/L (ref 96–112)
CO2 SERPL-SCNC: 24 MMOL/L (ref 20–33)
CREAT SERPL-MCNC: 0.94 MG/DL (ref 0.5–1.4)
EKG IMPRESSION: NORMAL
ERYTHROCYTE [DISTWIDTH] IN BLOOD BY AUTOMATED COUNT: 45.2 FL (ref 35.9–50)
GLUCOSE SERPL-MCNC: 90 MG/DL (ref 65–99)
HCT VFR BLD AUTO: 46.6 % (ref 42–52)
HGB BLD-MCNC: 15.8 G/DL (ref 14–18)
INR PPP: 1.05 (ref 0.87–1.13)
MCH RBC QN AUTO: 29.5 PG (ref 27–33)
MCHC RBC AUTO-ENTMCNC: 33.9 G/DL (ref 33.7–35.3)
MCV RBC AUTO: 87.1 FL (ref 81.4–97.8)
PLATELET # BLD AUTO: 197 K/UL (ref 164–446)
PMV BLD AUTO: 10.1 FL (ref 9–12.9)
POTASSIUM SERPL-SCNC: 3.8 MMOL/L (ref 3.6–5.5)
PROTHROMBIN TIME: 13.4 SEC (ref 12–14.6)
RBC # BLD AUTO: 5.35 M/UL (ref 4.7–6.1)
SODIUM SERPL-SCNC: 141 MMOL/L (ref 135–145)
WBC # BLD AUTO: 5.9 K/UL (ref 4.8–10.8)

## 2020-10-13 PROCEDURE — 36415 COLL VENOUS BLD VENIPUNCTURE: CPT

## 2020-10-13 PROCEDURE — 93005 ELECTROCARDIOGRAM TRACING: CPT

## 2020-10-13 PROCEDURE — 85730 THROMBOPLASTIN TIME PARTIAL: CPT

## 2020-10-13 PROCEDURE — 85027 COMPLETE CBC AUTOMATED: CPT

## 2020-10-13 PROCEDURE — 80048 BASIC METABOLIC PNL TOTAL CA: CPT

## 2020-10-13 PROCEDURE — 93010 ELECTROCARDIOGRAM REPORT: CPT | Performed by: INTERNAL MEDICINE

## 2020-10-13 PROCEDURE — 87640 STAPH A DNA AMP PROBE: CPT

## 2020-10-13 PROCEDURE — 85610 PROTHROMBIN TIME: CPT

## 2020-10-13 PROCEDURE — 87641 MR-STAPH DNA AMP PROBE: CPT

## 2020-10-13 RX ORDER — IBUPROFEN 800 MG/1
2400 TABLET ORAL EVERY 8 HOURS PRN
COMMUNITY
End: 2022-05-17

## 2020-10-13 RX ORDER — ACETAMINOPHEN 500 MG
500-1000 TABLET ORAL EVERY 6 HOURS PRN
COMMUNITY
End: 2021-07-14

## 2020-10-13 ASSESSMENT — FIBROSIS 4 INDEX: FIB4 SCORE: 1.11

## 2020-10-13 NOTE — OR NURSING
"Preadmit appointment: \" Preparing for your Procedure information\" sheet given to patient with verbal and written instructions. Patient instructed to continue prescribed medications through the day before surgery, instructed to take the following medications the day of surgery per anesthesia protocol: none. Pt denies anesthesia issues. Pt reports taking 3 800mg tablets of ibuprofen at one time, advised to discuss this dose with his MD or pharmacist and recommended to follow the directions on the medication bottle. Pt to have covid done 10/17 and given verbal and written instructions to self isolate until surgery and given covid symptoms to watch for and to notify MD if any symptoms develop.  Pt denies burning, urgency, frequency, pain with urination  "

## 2020-10-13 NOTE — DISCHARGE PLANNING
DISCHARGE PLANNING NOTE - TOTAL JOINT     Procedure: Procedure(s):  REVISION, TOTAL ARTHROPLASTY, HIP  Procedure Date: 10/19/2020  Insurance:  Payor: [employer] / [facility] / Product Type: *No Product type* /   Equipment currently available at home? cane and crutches  Steps into the home? 2  Steps within the home? 0  Toilet height? Standard  Type of shower? walk-in shower  Who will be with you during your recovery? Friends.  Is Outpatient Physical Therapy set up after surgery? Yes  Did you take the Total Joint Class and where? Yes, at [facility] book given to pt.   Planning on same day discharge? No.     This writer met with pt during his preadmission appointment. Pt states he has all needed equipment due to previous surgery. He will bring his own crutches. Pt states he had home health after previous surgery but doesn't remember which agency. Home safety checklist reviewed, pt declined taking a copy. No further dc needs identified at this time. Anticipate dc to home without barriers.

## 2020-10-14 LAB
SCCMEC + MECA PNL NOSE NAA+PROBE: NEGATIVE
SCCMEC + MECA PNL NOSE NAA+PROBE: NEGATIVE

## 2020-10-17 ENCOUNTER — PRE-ADMISSION TESTING (OUTPATIENT)
Dept: ADMISSIONS | Facility: MEDICAL CENTER | Age: 60
DRG: 468 | End: 2020-10-17
Attending: ORTHOPAEDIC SURGERY
Payer: COMMERCIAL

## 2020-10-17 DIAGNOSIS — Z01.812 PRE-OPERATIVE LABORATORY EXAMINATION: ICD-10-CM

## 2020-10-17 LAB — COVID ORDER STATUS COVID19: NORMAL

## 2020-10-17 PROCEDURE — C9803 HOPD COVID-19 SPEC COLLECT: HCPCS

## 2020-10-17 PROCEDURE — U0003 INFECTIOUS AGENT DETECTION BY NUCLEIC ACID (DNA OR RNA); SEVERE ACUTE RESPIRATORY SYNDROME CORONAVIRUS 2 (SARS-COV-2) (CORONAVIRUS DISEASE [COVID-19]), AMPLIFIED PROBE TECHNIQUE, MAKING USE OF HIGH THROUGHPUT TECHNOLOGIES AS DESCRIBED BY CMS-2020-01-R: HCPCS

## 2020-10-17 NOTE — OR NURSING
"1701: Pt was called and asked COVID screening questions. Answered \"No\" to all questions. COVID test not done yet, pt to have it done tomorrow. Pt reminded to bring a mask and that only one visitor is allowed.  "

## 2020-10-18 LAB
SARS-COV-2 RNA RESP QL NAA+PROBE: NOTDETECTED
SPECIMEN SOURCE: NORMAL

## 2020-10-19 ENCOUNTER — APPOINTMENT (OUTPATIENT)
Dept: RADIOLOGY | Facility: MEDICAL CENTER | Age: 60
DRG: 468 | End: 2020-10-19
Attending: ORTHOPAEDIC SURGERY
Payer: COMMERCIAL

## 2020-10-19 ENCOUNTER — HOSPITAL ENCOUNTER (INPATIENT)
Facility: MEDICAL CENTER | Age: 60
LOS: 3 days | DRG: 468 | End: 2020-10-22
Attending: ORTHOPAEDIC SURGERY | Admitting: ORTHOPAEDIC SURGERY
Payer: COMMERCIAL

## 2020-10-19 ENCOUNTER — ANESTHESIA (OUTPATIENT)
Dept: SURGERY | Facility: MEDICAL CENTER | Age: 60
DRG: 468 | End: 2020-10-19
Payer: COMMERCIAL

## 2020-10-19 ENCOUNTER — ANESTHESIA EVENT (OUTPATIENT)
Dept: SURGERY | Facility: MEDICAL CENTER | Age: 60
DRG: 468 | End: 2020-10-19
Payer: COMMERCIAL

## 2020-10-19 DIAGNOSIS — Z96.641 STATUS POST TOTAL REPLACEMENT OF RIGHT HIP: ICD-10-CM

## 2020-10-19 PROCEDURE — 160031 HCHG SURGERY MINUTES - 1ST 30 MINS LEVEL 5: Performed by: ORTHOPAEDIC SURGERY

## 2020-10-19 PROCEDURE — 160042 HCHG SURGERY MINUTES - EA ADDL 1 MIN LEVEL 5: Performed by: ORTHOPAEDIC SURGERY

## 2020-10-19 PROCEDURE — 500367 HCHG DRAIN KIT, HEMOVAC: Performed by: ORTHOPAEDIC SURGERY

## 2020-10-19 PROCEDURE — 770001 HCHG ROOM/CARE - MED/SURG/GYN PRIV*

## 2020-10-19 PROCEDURE — 502000 HCHG MISC OR IMPLANTS RC 0278: Performed by: ORTHOPAEDIC SURGERY

## 2020-10-19 PROCEDURE — 160036 HCHG PACU - EA ADDL 30 MINS PHASE I: Performed by: ORTHOPAEDIC SURGERY

## 2020-10-19 PROCEDURE — 700102 HCHG RX REV CODE 250 W/ 637 OVERRIDE(OP): Performed by: ANESTHESIOLOGY

## 2020-10-19 PROCEDURE — 72170 X-RAY EXAM OF PELVIS: CPT

## 2020-10-19 PROCEDURE — 700101 HCHG RX REV CODE 250: Performed by: ORTHOPAEDIC SURGERY

## 2020-10-19 PROCEDURE — 501838 HCHG SUTURE GENERAL: Performed by: ORTHOPAEDIC SURGERY

## 2020-10-19 PROCEDURE — 160048 HCHG OR STATISTICAL LEVEL 1-5: Performed by: ORTHOPAEDIC SURGERY

## 2020-10-19 PROCEDURE — 302135 SEQUENTIAL COMPRESSION MACHINE: Performed by: ORTHOPAEDIC SURGERY

## 2020-10-19 PROCEDURE — 700111 HCHG RX REV CODE 636 W/ 250 OVERRIDE (IP): Performed by: ANESTHESIOLOGY

## 2020-10-19 PROCEDURE — 160002 HCHG RECOVERY MINUTES (STAT): Performed by: ORTHOPAEDIC SURGERY

## 2020-10-19 PROCEDURE — G0378 HOSPITAL OBSERVATION PER HR: HCPCS

## 2020-10-19 PROCEDURE — 160035 HCHG PACU - 1ST 60 MINS PHASE I: Performed by: ORTHOPAEDIC SURGERY

## 2020-10-19 PROCEDURE — 700105 HCHG RX REV CODE 258: Performed by: ORTHOPAEDIC SURGERY

## 2020-10-19 PROCEDURE — A9270 NON-COVERED ITEM OR SERVICE: HCPCS | Performed by: ORTHOPAEDIC SURGERY

## 2020-10-19 PROCEDURE — 700102 HCHG RX REV CODE 250 W/ 637 OVERRIDE(OP): Performed by: ORTHOPAEDIC SURGERY

## 2020-10-19 PROCEDURE — 0SP90JZ REMOVAL OF SYNTHETIC SUBSTITUTE FROM RIGHT HIP JOINT, OPEN APPROACH: ICD-10-PCS | Performed by: ORTHOPAEDIC SURGERY

## 2020-10-19 PROCEDURE — C1776 JOINT DEVICE (IMPLANTABLE): HCPCS | Performed by: ORTHOPAEDIC SURGERY

## 2020-10-19 PROCEDURE — 700111 HCHG RX REV CODE 636 W/ 250 OVERRIDE (IP): Performed by: ORTHOPAEDIC SURGERY

## 2020-10-19 PROCEDURE — 0SR90JA REPLACEMENT OF RIGHT HIP JOINT WITH SYNTHETIC SUBSTITUTE, UNCEMENTED, OPEN APPROACH: ICD-10-PCS | Performed by: ORTHOPAEDIC SURGERY

## 2020-10-19 PROCEDURE — 160009 HCHG ANES TIME/MIN: Performed by: ORTHOPAEDIC SURGERY

## 2020-10-19 PROCEDURE — A9270 NON-COVERED ITEM OR SERVICE: HCPCS | Performed by: ANESTHESIOLOGY

## 2020-10-19 PROCEDURE — C1713 ANCHOR/SCREW BN/BN,TIS/BN: HCPCS | Performed by: ORTHOPAEDIC SURGERY

## 2020-10-19 PROCEDURE — 700101 HCHG RX REV CODE 250: Performed by: ANESTHESIOLOGY

## 2020-10-19 PROCEDURE — 502578 HCHG PACK, TOTAL HIP: Performed by: ORTHOPAEDIC SURGERY

## 2020-10-19 DEVICE — IMPLANTABLE DEVICE: Type: IMPLANTABLE DEVICE | Site: HIP | Status: FUNCTIONAL

## 2020-10-19 DEVICE — GRAFT BONE VIABLE BONE MATRIX BIO4 5 CC: Type: IMPLANTABLE DEVICE | Site: HIP | Status: FUNCTIONAL

## 2020-10-19 DEVICE — GRAFT BONE VIABLE BONE MATRIX BIO4 10 CC: Type: IMPLANTABLE DEVICE | Site: HIP | Status: FUNCTIONAL

## 2020-10-19 RX ORDER — ONDANSETRON 4 MG/1
4 TABLET, ORALLY DISINTEGRATING ORAL EVERY 4 HOURS PRN
Status: DISCONTINUED | OUTPATIENT
Start: 2020-10-19 | End: 2020-10-22 | Stop reason: HOSPADM

## 2020-10-19 RX ORDER — SODIUM CHLORIDE, SODIUM LACTATE, POTASSIUM CHLORIDE, CALCIUM CHLORIDE 600; 310; 30; 20 MG/100ML; MG/100ML; MG/100ML; MG/100ML
INJECTION, SOLUTION INTRAVENOUS CONTINUOUS
Status: DISCONTINUED | OUTPATIENT
Start: 2020-10-19 | End: 2020-10-19 | Stop reason: HOSPADM

## 2020-10-19 RX ORDER — OXYCODONE HYDROCHLORIDE 5 MG/1
5 TABLET ORAL EVERY 4 HOURS PRN
Qty: 42 TAB | Refills: 0 | Status: SHIPPED | OUTPATIENT
Start: 2020-10-19 | End: 2020-10-26

## 2020-10-19 RX ORDER — DEXAMETHASONE SODIUM PHOSPHATE 4 MG/ML
INJECTION, SOLUTION INTRA-ARTICULAR; INTRALESIONAL; INTRAMUSCULAR; INTRAVENOUS; SOFT TISSUE PRN
Status: DISCONTINUED | OUTPATIENT
Start: 2020-10-19 | End: 2020-10-19 | Stop reason: SURG

## 2020-10-19 RX ORDER — ROCURONIUM BROMIDE 10 MG/ML
INJECTION, SOLUTION INTRAVENOUS PRN
Status: DISCONTINUED | OUTPATIENT
Start: 2020-10-19 | End: 2020-10-19 | Stop reason: SURG

## 2020-10-19 RX ORDER — HALOPERIDOL 5 MG/ML
1 INJECTION INTRAMUSCULAR
Status: DISCONTINUED | OUTPATIENT
Start: 2020-10-19 | End: 2020-10-19 | Stop reason: HOSPADM

## 2020-10-19 RX ORDER — HYDROMORPHONE HYDROCHLORIDE 2 MG/ML
INJECTION, SOLUTION INTRAMUSCULAR; INTRAVENOUS; SUBCUTANEOUS PRN
Status: DISCONTINUED | OUTPATIENT
Start: 2020-10-19 | End: 2020-10-19 | Stop reason: SURG

## 2020-10-19 RX ORDER — BISACODYL 10 MG
10 SUPPOSITORY, RECTAL RECTAL
Status: DISCONTINUED | OUTPATIENT
Start: 2020-10-19 | End: 2020-10-22 | Stop reason: HOSPADM

## 2020-10-19 RX ORDER — HYDRALAZINE HYDROCHLORIDE 20 MG/ML
5 INJECTION INTRAMUSCULAR; INTRAVENOUS
Status: DISCONTINUED | OUTPATIENT
Start: 2020-10-19 | End: 2020-10-19 | Stop reason: HOSPADM

## 2020-10-19 RX ORDER — HYDROMORPHONE HYDROCHLORIDE 1 MG/ML
0.4 INJECTION, SOLUTION INTRAMUSCULAR; INTRAVENOUS; SUBCUTANEOUS
Status: DISCONTINUED | OUTPATIENT
Start: 2020-10-19 | End: 2020-10-19 | Stop reason: HOSPADM

## 2020-10-19 RX ORDER — AMOXICILLIN 250 MG
1 CAPSULE ORAL NIGHTLY
Status: DISCONTINUED | OUTPATIENT
Start: 2020-10-19 | End: 2020-10-22 | Stop reason: HOSPADM

## 2020-10-19 RX ORDER — CEFAZOLIN SODIUM 1 G/3ML
INJECTION, POWDER, FOR SOLUTION INTRAMUSCULAR; INTRAVENOUS PRN
Status: DISCONTINUED | OUTPATIENT
Start: 2020-10-19 | End: 2020-10-19 | Stop reason: SURG

## 2020-10-19 RX ORDER — DOCUSATE SODIUM 100 MG/1
100 CAPSULE, LIQUID FILLED ORAL 2 TIMES DAILY
Status: DISCONTINUED | OUTPATIENT
Start: 2020-10-19 | End: 2020-10-22 | Stop reason: HOSPADM

## 2020-10-19 RX ORDER — DIPHENHYDRAMINE HYDROCHLORIDE 50 MG/ML
25 INJECTION INTRAMUSCULAR; INTRAVENOUS EVERY 6 HOURS PRN
Status: DISCONTINUED | OUTPATIENT
Start: 2020-10-19 | End: 2020-10-22 | Stop reason: HOSPADM

## 2020-10-19 RX ORDER — CEFAZOLIN SODIUM IN 0.9 % NACL 2 G/100 ML
PLASTIC BAG, INJECTION (ML) INTRAVENOUS
Status: ACTIVE
Start: 2020-10-19 | End: 2020-10-20

## 2020-10-19 RX ORDER — HYDROMORPHONE HYDROCHLORIDE 1 MG/ML
0.5 INJECTION, SOLUTION INTRAMUSCULAR; INTRAVENOUS; SUBCUTANEOUS
Status: DISCONTINUED | OUTPATIENT
Start: 2020-10-19 | End: 2020-10-22 | Stop reason: HOSPADM

## 2020-10-19 RX ORDER — TRAMADOL HYDROCHLORIDE 50 MG/1
50 TABLET ORAL EVERY 4 HOURS PRN
Status: DISCONTINUED | OUTPATIENT
Start: 2020-10-19 | End: 2020-10-22 | Stop reason: HOSPADM

## 2020-10-19 RX ORDER — OXYCODONE HYDROCHLORIDE 5 MG/1
5 TABLET ORAL
Status: DISCONTINUED | OUTPATIENT
Start: 2020-10-19 | End: 2020-10-22 | Stop reason: HOSPADM

## 2020-10-19 RX ORDER — NEOSTIGMINE METHYLSULFATE 1 MG/ML
INJECTION, SOLUTION INTRAVENOUS PRN
Status: DISCONTINUED | OUTPATIENT
Start: 2020-10-19 | End: 2020-10-19 | Stop reason: SURG

## 2020-10-19 RX ORDER — LIDOCAINE HYDROCHLORIDE 20 MG/ML
INJECTION, SOLUTION EPIDURAL; INFILTRATION; INTRACAUDAL; PERINEURAL PRN
Status: DISCONTINUED | OUTPATIENT
Start: 2020-10-19 | End: 2020-10-19 | Stop reason: SURG

## 2020-10-19 RX ORDER — OXYCODONE HCL 5 MG/5 ML
5 SOLUTION, ORAL ORAL
Status: COMPLETED | OUTPATIENT
Start: 2020-10-19 | End: 2020-10-19

## 2020-10-19 RX ORDER — DEXAMETHASONE SODIUM PHOSPHATE 4 MG/ML
4 INJECTION, SOLUTION INTRA-ARTICULAR; INTRALESIONAL; INTRAMUSCULAR; INTRAVENOUS; SOFT TISSUE
Status: DISCONTINUED | OUTPATIENT
Start: 2020-10-19 | End: 2020-10-22 | Stop reason: HOSPADM

## 2020-10-19 RX ORDER — KETAMINE HYDROCHLORIDE 50 MG/ML
INJECTION, SOLUTION INTRAMUSCULAR; INTRAVENOUS PRN
Status: DISCONTINUED | OUTPATIENT
Start: 2020-10-19 | End: 2020-10-19 | Stop reason: SURG

## 2020-10-19 RX ORDER — CELECOXIB 200 MG/1
200 CAPSULE ORAL ONCE
Status: COMPLETED | OUTPATIENT
Start: 2020-10-19 | End: 2020-10-19

## 2020-10-19 RX ORDER — DEXMEDETOMIDINE HYDROCHLORIDE 100 UG/ML
INJECTION, SOLUTION INTRAVENOUS PRN
Status: DISCONTINUED | OUTPATIENT
Start: 2020-10-19 | End: 2020-10-19 | Stop reason: SURG

## 2020-10-19 RX ORDER — ONDANSETRON 2 MG/ML
INJECTION INTRAMUSCULAR; INTRAVENOUS PRN
Status: DISCONTINUED | OUTPATIENT
Start: 2020-10-19 | End: 2020-10-19 | Stop reason: SURG

## 2020-10-19 RX ORDER — ACETAMINOPHEN 500 MG
1000 TABLET ORAL ONCE
Status: COMPLETED | OUTPATIENT
Start: 2020-10-19 | End: 2020-10-19

## 2020-10-19 RX ORDER — GLYCOPYRROLATE 0.2 MG/ML
INJECTION INTRAMUSCULAR; INTRAVENOUS PRN
Status: DISCONTINUED | OUTPATIENT
Start: 2020-10-19 | End: 2020-10-19 | Stop reason: SURG

## 2020-10-19 RX ORDER — KETOROLAC TROMETHAMINE 30 MG/ML
30 INJECTION, SOLUTION INTRAMUSCULAR; INTRAVENOUS EVERY 6 HOURS
Status: COMPLETED | OUTPATIENT
Start: 2020-10-19 | End: 2020-10-20

## 2020-10-19 RX ORDER — GABAPENTIN 300 MG/1
300 CAPSULE ORAL ONCE
Status: COMPLETED | OUTPATIENT
Start: 2020-10-19 | End: 2020-10-19

## 2020-10-19 RX ORDER — ONDANSETRON 2 MG/ML
4 INJECTION INTRAMUSCULAR; INTRAVENOUS EVERY 4 HOURS PRN
Status: DISCONTINUED | OUTPATIENT
Start: 2020-10-19 | End: 2020-10-22 | Stop reason: HOSPADM

## 2020-10-19 RX ORDER — LORAZEPAM 2 MG/ML
0.5 INJECTION INTRAMUSCULAR
Status: DISCONTINUED | OUTPATIENT
Start: 2020-10-19 | End: 2020-10-19 | Stop reason: HOSPADM

## 2020-10-19 RX ORDER — AMOXICILLIN 250 MG
1 CAPSULE ORAL
Status: DISCONTINUED | OUTPATIENT
Start: 2020-10-19 | End: 2020-10-22 | Stop reason: HOSPADM

## 2020-10-19 RX ORDER — MEPERIDINE HYDROCHLORIDE 25 MG/ML
12.5 INJECTION INTRAMUSCULAR; INTRAVENOUS; SUBCUTANEOUS
Status: DISCONTINUED | OUTPATIENT
Start: 2020-10-19 | End: 2020-10-19 | Stop reason: HOSPADM

## 2020-10-19 RX ORDER — OXYCODONE HCL 5 MG/5 ML
10 SOLUTION, ORAL ORAL
Status: COMPLETED | OUTPATIENT
Start: 2020-10-19 | End: 2020-10-19

## 2020-10-19 RX ORDER — HYDROMORPHONE HYDROCHLORIDE 1 MG/ML
0.5 INJECTION, SOLUTION INTRAMUSCULAR; INTRAVENOUS; SUBCUTANEOUS
Status: DISCONTINUED | OUTPATIENT
Start: 2020-10-19 | End: 2020-10-19 | Stop reason: HOSPADM

## 2020-10-19 RX ORDER — SODIUM CHLORIDE, SODIUM LACTATE, POTASSIUM CHLORIDE, CALCIUM CHLORIDE 600; 310; 30; 20 MG/100ML; MG/100ML; MG/100ML; MG/100ML
INJECTION, SOLUTION INTRAVENOUS CONTINUOUS
Status: ACTIVE | OUTPATIENT
Start: 2020-10-19 | End: 2020-10-19

## 2020-10-19 RX ORDER — SODIUM CHLORIDE, SODIUM LACTATE, POTASSIUM CHLORIDE, CALCIUM CHLORIDE 600; 310; 30; 20 MG/100ML; MG/100ML; MG/100ML; MG/100ML
INJECTION, SOLUTION INTRAVENOUS CONTINUOUS
Status: CANCELLED | OUTPATIENT
Start: 2020-10-19 | End: 2020-10-19

## 2020-10-19 RX ORDER — HYDROMORPHONE HYDROCHLORIDE 1 MG/ML
0.2 INJECTION, SOLUTION INTRAMUSCULAR; INTRAVENOUS; SUBCUTANEOUS
Status: DISCONTINUED | OUTPATIENT
Start: 2020-10-19 | End: 2020-10-19 | Stop reason: HOSPADM

## 2020-10-19 RX ORDER — TRANEXAMIC ACID 100 MG/ML
INJECTION, SOLUTION INTRAVENOUS PRN
Status: DISCONTINUED | OUTPATIENT
Start: 2020-10-19 | End: 2020-10-19 | Stop reason: SURG

## 2020-10-19 RX ORDER — SCOLOPAMINE TRANSDERMAL SYSTEM 1 MG/1
1 PATCH, EXTENDED RELEASE TRANSDERMAL
Status: DISCONTINUED | OUTPATIENT
Start: 2020-10-19 | End: 2020-10-22 | Stop reason: HOSPADM

## 2020-10-19 RX ORDER — ONDANSETRON 2 MG/ML
4 INJECTION INTRAMUSCULAR; INTRAVENOUS
Status: DISCONTINUED | OUTPATIENT
Start: 2020-10-19 | End: 2020-10-19 | Stop reason: HOSPADM

## 2020-10-19 RX ORDER — CELECOXIB 200 MG/1
200 CAPSULE ORAL 2 TIMES DAILY
Status: DISCONTINUED | OUTPATIENT
Start: 2020-10-20 | End: 2020-10-22 | Stop reason: HOSPADM

## 2020-10-19 RX ORDER — POLYETHYLENE GLYCOL 3350 17 G/17G
1 POWDER, FOR SOLUTION ORAL 2 TIMES DAILY PRN
Status: DISCONTINUED | OUTPATIENT
Start: 2020-10-19 | End: 2020-10-22 | Stop reason: HOSPADM

## 2020-10-19 RX ORDER — OXYCODONE HYDROCHLORIDE 10 MG/1
10 TABLET ORAL
Status: DISCONTINUED | OUTPATIENT
Start: 2020-10-19 | End: 2020-10-22 | Stop reason: HOSPADM

## 2020-10-19 RX ORDER — MAGNESIUM SULFATE HEPTAHYDRATE 40 MG/ML
INJECTION, SOLUTION INTRAVENOUS PRN
Status: DISCONTINUED | OUTPATIENT
Start: 2020-10-19 | End: 2020-10-19 | Stop reason: SURG

## 2020-10-19 RX ORDER — MIDAZOLAM HYDROCHLORIDE 1 MG/ML
INJECTION INTRAMUSCULAR; INTRAVENOUS PRN
Status: DISCONTINUED | OUTPATIENT
Start: 2020-10-19 | End: 2020-10-19 | Stop reason: SURG

## 2020-10-19 RX ORDER — DIPHENHYDRAMINE HYDROCHLORIDE 50 MG/ML
12.5 INJECTION INTRAMUSCULAR; INTRAVENOUS
Status: DISCONTINUED | OUTPATIENT
Start: 2020-10-19 | End: 2020-10-19 | Stop reason: HOSPADM

## 2020-10-19 RX ORDER — ACETAMINOPHEN 500 MG
1000 TABLET ORAL EVERY 6 HOURS
Status: DISCONTINUED | OUTPATIENT
Start: 2020-10-19 | End: 2020-10-22 | Stop reason: HOSPADM

## 2020-10-19 RX ORDER — ENEMA 19; 7 G/133ML; G/133ML
1 ENEMA RECTAL
Status: DISCONTINUED | OUTPATIENT
Start: 2020-10-19 | End: 2020-10-22 | Stop reason: HOSPADM

## 2020-10-19 RX ADMIN — ACETAMINOPHEN 1000 MG: 500 TABLET, FILM COATED ORAL at 11:14

## 2020-10-19 RX ADMIN — ONDANSETRON 4 MG: 2 INJECTION INTRAMUSCULAR; INTRAVENOUS at 15:12

## 2020-10-19 RX ADMIN — HYDROMORPHONE HYDROCHLORIDE 0.4 MG: 1 INJECTION, SOLUTION INTRAMUSCULAR; INTRAVENOUS; SUBCUTANEOUS at 16:42

## 2020-10-19 RX ADMIN — EPHEDRINE SULFATE 10 MG: 50 INJECTION INTRAMUSCULAR; INTRAVENOUS; SUBCUTANEOUS at 14:40

## 2020-10-19 RX ADMIN — PROPOFOL 200 MG: 10 INJECTION, EMULSION INTRAVENOUS at 13:31

## 2020-10-19 RX ADMIN — GABAPENTIN 300 MG: 300 CAPSULE ORAL at 11:14

## 2020-10-19 RX ADMIN — GLYCOPYRROLATE 0.6 MG: 0.2 INJECTION, SOLUTION INTRAMUSCULAR; INTRAVENOUS at 15:12

## 2020-10-19 RX ADMIN — OXYCODONE 5 MG: 5 TABLET ORAL at 22:23

## 2020-10-19 RX ADMIN — ROCURONIUM BROMIDE 50 MG: 10 INJECTION, SOLUTION INTRAVENOUS at 13:31

## 2020-10-19 RX ADMIN — HYDROMORPHONE HYDROCHLORIDE 0.4 MG: 1 INJECTION, SOLUTION INTRAMUSCULAR; INTRAVENOUS; SUBCUTANEOUS at 16:32

## 2020-10-19 RX ADMIN — FENTANYL CITRATE 50 MCG: 50 INJECTION, SOLUTION INTRAMUSCULAR; INTRAVENOUS at 16:09

## 2020-10-19 RX ADMIN — ROCURONIUM BROMIDE 15 MG: 10 INJECTION, SOLUTION INTRAVENOUS at 14:36

## 2020-10-19 RX ADMIN — HYDROMORPHONE HYDROCHLORIDE 0.2 MG: 1 INJECTION, SOLUTION INTRAMUSCULAR; INTRAVENOUS; SUBCUTANEOUS at 17:01

## 2020-10-19 RX ADMIN — SODIUM CHLORIDE, POTASSIUM CHLORIDE, SODIUM LACTATE AND CALCIUM CHLORIDE: 600; 310; 30; 20 INJECTION, SOLUTION INTRAVENOUS at 14:38

## 2020-10-19 RX ADMIN — FENTANYL CITRATE 50 MCG: 50 INJECTION, SOLUTION INTRAMUSCULAR; INTRAVENOUS at 15:15

## 2020-10-19 RX ADMIN — DEXMEDETOMIDINE HYDROCHLORIDE 70 MCG: 100 INJECTION, SOLUTION INTRAVENOUS at 13:31

## 2020-10-19 RX ADMIN — ACETAMINOPHEN 1000 MG: 500 TABLET, FILM COATED ORAL at 20:38

## 2020-10-19 RX ADMIN — KETAMINE HYDROCHLORIDE 25 MG: 50 INJECTION INTRAMUSCULAR; INTRAVENOUS at 14:14

## 2020-10-19 RX ADMIN — SODIUM CHLORIDE, POTASSIUM CHLORIDE, SODIUM LACTATE AND CALCIUM CHLORIDE: 600; 310; 30; 20 INJECTION, SOLUTION INTRAVENOUS at 11:15

## 2020-10-19 RX ADMIN — EPHEDRINE SULFATE 10 MG: 50 INJECTION INTRAMUSCULAR; INTRAVENOUS; SUBCUTANEOUS at 14:08

## 2020-10-19 RX ADMIN — FENTANYL CITRATE 50 MCG: 50 INJECTION, SOLUTION INTRAMUSCULAR; INTRAVENOUS at 15:18

## 2020-10-19 RX ADMIN — LORAZEPAM 0.5 MG: 2 INJECTION INTRAMUSCULAR; INTRAVENOUS at 17:25

## 2020-10-19 RX ADMIN — CEFAZOLIN 2 G: 1 INJECTION, POWDER, FOR SOLUTION INTRAVENOUS at 13:28

## 2020-10-19 RX ADMIN — KETAMINE HYDROCHLORIDE 75 MG: 50 INJECTION INTRAMUSCULAR; INTRAVENOUS at 13:31

## 2020-10-19 RX ADMIN — MAGNESIUM SULFATE IN WATER 4 G: 40 INJECTION, SOLUTION INTRAVENOUS at 13:56

## 2020-10-19 RX ADMIN — LORAZEPAM 0.5 MG: 2 INJECTION INTRAMUSCULAR; INTRAVENOUS at 17:07

## 2020-10-19 RX ADMIN — CELECOXIB 200 MG: 200 CAPSULE ORAL at 11:14

## 2020-10-19 RX ADMIN — LIDOCAINE HYDROCHLORIDE 0.5 ML: 10 INJECTION, SOLUTION INFILTRATION; PERINEURAL at 11:15

## 2020-10-19 RX ADMIN — HYDROMORPHONE HYDROCHLORIDE 0.5 MG: 1 INJECTION, SOLUTION INTRAMUSCULAR; INTRAVENOUS; SUBCUTANEOUS at 17:09

## 2020-10-19 RX ADMIN — MIDAZOLAM HYDROCHLORIDE 2 MG: 1 INJECTION, SOLUTION INTRAMUSCULAR; INTRAVENOUS at 13:28

## 2020-10-19 RX ADMIN — KETOROLAC TROMETHAMINE 30 MG: 30 INJECTION, SOLUTION INTRAMUSCULAR at 20:39

## 2020-10-19 RX ADMIN — NEOSTIGMINE METHYLSULFATE 3 MG: 1 INJECTION INTRAVENOUS at 15:12

## 2020-10-19 RX ADMIN — EPHEDRINE SULFATE 10 MG: 50 INJECTION INTRAMUSCULAR; INTRAVENOUS; SUBCUTANEOUS at 14:05

## 2020-10-19 RX ADMIN — LIDOCAINE HYDROCHLORIDE 100 MG: 20 INJECTION, SOLUTION EPIDURAL; INFILTRATION; INTRACAUDAL; PERINEURAL at 13:31

## 2020-10-19 RX ADMIN — DOCUSATE SODIUM 100 MG: 100 CAPSULE, LIQUID FILLED ORAL at 20:39

## 2020-10-19 RX ADMIN — FENTANYL CITRATE 50 MCG: 50 INJECTION, SOLUTION INTRAMUSCULAR; INTRAVENOUS at 16:15

## 2020-10-19 RX ADMIN — SODIUM CHLORIDE, POTASSIUM CHLORIDE, SODIUM LACTATE AND CALCIUM CHLORIDE: 600; 310; 30; 20 INJECTION, SOLUTION INTRAVENOUS at 20:45

## 2020-10-19 RX ADMIN — HYDROMORPHONE HYDROCHLORIDE 1 MG: 2 INJECTION, SOLUTION INTRAMUSCULAR; INTRAVENOUS; SUBCUTANEOUS at 13:30

## 2020-10-19 RX ADMIN — TRANEXAMIC ACID 1000 MG: 100 INJECTION, SOLUTION INTRAVENOUS at 13:31

## 2020-10-19 RX ADMIN — OXYCODONE HYDROCHLORIDE 10 MG: 5 SOLUTION ORAL at 16:07

## 2020-10-19 RX ADMIN — DEXAMETHASONE SODIUM PHOSPHATE 8 MG: 4 INJECTION, SOLUTION INTRAMUSCULAR; INTRAVENOUS at 13:37

## 2020-10-19 RX ADMIN — HYDROMORPHONE HYDROCHLORIDE 1 MG: 2 INJECTION, SOLUTION INTRAMUSCULAR; INTRAVENOUS; SUBCUTANEOUS at 14:14

## 2020-10-19 ASSESSMENT — LIFESTYLE VARIABLES
TOTAL SCORE: 0
HOW MANY TIMES IN THE PAST YEAR HAVE YOU HAD 5 OR MORE DRINKS IN A DAY: 0
HAVE PEOPLE ANNOYED YOU BY CRITICIZING YOUR DRINKING: NO
ALCOHOL_USE: NO
AVERAGE NUMBER OF DAYS PER WEEK YOU HAVE A DRINK CONTAINING ALCOHOL: 0
HAVE YOU EVER FELT YOU SHOULD CUT DOWN ON YOUR DRINKING: NO
ON A TYPICAL DAY WHEN YOU DRINK ALCOHOL HOW MANY DRINKS DO YOU HAVE: 0
TOTAL SCORE: 0
CONSUMPTION TOTAL: NEGATIVE
TOTAL SCORE: 0
EVER HAD A DRINK FIRST THING IN THE MORNING TO STEADY YOUR NERVES TO GET RID OF A HANGOVER: NO
EVER FELT BAD OR GUILTY ABOUT YOUR DRINKING: NO

## 2020-10-19 ASSESSMENT — PAIN DESCRIPTION - PAIN TYPE
TYPE: ACUTE PAIN;SURGICAL PAIN
TYPE: ACUTE PAIN;SURGICAL PAIN
TYPE: SURGICAL PAIN
TYPE: ACUTE PAIN;SURGICAL PAIN
TYPE: ACUTE PAIN;SURGICAL PAIN
TYPE: CHRONIC PAIN
TYPE: ACUTE PAIN;SURGICAL PAIN
TYPE: ACUTE PAIN;SURGICAL PAIN
TYPE: SURGICAL PAIN
TYPE: ACUTE PAIN;SURGICAL PAIN
TYPE: SURGICAL PAIN;ACUTE PAIN
TYPE: ACUTE PAIN;SURGICAL PAIN
TYPE: SURGICAL PAIN
TYPE: ACUTE PAIN;SURGICAL PAIN
TYPE: ACUTE PAIN;SURGICAL PAIN
TYPE: SURGICAL PAIN
TYPE: SURGICAL PAIN
TYPE: ACUTE PAIN;SURGICAL PAIN

## 2020-10-19 ASSESSMENT — PATIENT HEALTH QUESTIONNAIRE - PHQ9
1. LITTLE INTEREST OR PLEASURE IN DOING THINGS: NOT AT ALL
SUM OF ALL RESPONSES TO PHQ9 QUESTIONS 1 AND 2: 0
2. FEELING DOWN, DEPRESSED, IRRITABLE, OR HOPELESS: NOT AT ALL

## 2020-10-19 ASSESSMENT — COPD QUESTIONNAIRES
DO YOU EVER COUGH UP ANY MUCUS OR PHLEGM?: NO/ONLY WITH OCCASIONAL COLDS OR INFECTIONS
HAVE YOU SMOKED AT LEAST 100 CIGARETTES IN YOUR ENTIRE LIFE: NO/DON'T KNOW
DURING THE PAST 4 WEEKS HOW MUCH DID YOU FEEL SHORT OF BREATH: NONE/LITTLE OF THE TIME
COPD SCREENING SCORE: 2

## 2020-10-19 ASSESSMENT — PAIN SCALES - GENERAL: PAIN_LEVEL: 8

## 2020-10-19 NOTE — ANESTHESIA PREPROCEDURE EVALUATION
Anxiety, AZEEM risk factors, multiple prior surgeries with no anesthesia issues. Denies: MI/CHF/smoking/CVA/DM/CKD      Relevant Problems   Other   (+) Synovitis of hip       Physical Exam    Airway   Mallampati: II  TM distance: >3 FB  Neck ROM: full       Cardiovascular - normal exam  Rhythm: regular  Rate: normal  (-) murmur     Dental - normal exam           Pulmonary - normal exam  Breath sounds clear to auscultation     Abdominal    Neurological - normal exam                 Anesthesia Plan    ASA 2       Plan - general       Airway plan will be ETT        Induction: intravenous    Postoperative Plan: Postoperative administration of opioids is intended.    Pertinent diagnostic labs and testing reviewed    Informed Consent:    Anesthetic plan and risks discussed with patient.    Use of blood products discussed with: patient whom consented to blood products.

## 2020-10-19 NOTE — ANESTHESIA PROCEDURE NOTES
Airway    Date/Time: 10/19/2020 1:33 PM  Performed by: Andrea Hyde M.D.  Authorized by: Andrea Hyde M.D.     Location:  OR  Urgency:  Elective  Difficult Airway: No    Indications for Airway Management:  Anesthesia      Spontaneous Ventilation: absent    Sedation Level:  Deep  Preoxygenated: Yes    Patient Position:  Sniffing  Mask Difficulty Assessment:  1 - vent by mask  Final Airway Type:  Endotracheal airway  Final Endotracheal Airway:  ETT  Cuffed: Yes    Technique Used for Successful ETT Placement:  Direct laryngoscopy    Insertion Site:  Oral  Blade Type:  Umberto  Laryngoscope Blade/Videolaryngoscope Blade Size:  4  ETT Size (mm):  7.0  Measured from:  Lips  ETT to Lips (cm):  23  Placement Verified by: auscultation and capnometry    Cormack-Lehane Classification:  Grade IIa - partial view of glottis  Number of Attempts at Approach:  1

## 2020-10-19 NOTE — ANESTHESIA POSTPROCEDURE EVALUATION
Patient: Samir Godinez    Procedure Summary     Date: 10/19/20 Room / Location:  OR 06 / SURGERY [facility]    Anesthesia Start: 1328 Anesthesia Stop: 1544    Procedure: REVISION, TOTAL ARTHROPLASTY, HIP (Right Hip) Diagnosis: (HIP PAIN RIGHT, PAIN DUE TO OTHER INTERNAL PROSTHETIC DEVICES, HX OF HIP REPLACEMENT RIGHT)    Surgeon: Emigdio Sales M.D. Responsible Provider: Andrea Hyde M.D.    Anesthesia Type: general ASA Status: 2          Final Anesthesia Type: general  Last vitals  BP   Blood Pressure: 125/62    Temp   36.2 °C (97.2 °F)    Pulse   Pulse: (!) 53   Resp   12    SpO2   100 %      Anesthesia Post Evaluation    Patient location during evaluation: PACU  Patient participation: complete - patient participated  Level of consciousness: awake and alert  Pain score: 8  Patient has history of chronic opioid use due to multiple orthopedic surgeries. This has altered his pain tolerance and made his pain and pain control expectations hard to control. He reports severe pain despite dozing off to sleep intermittently and requiring supplemental oxygen.  Airway patency: patent  Anesthetic complications: no  Cardiovascular status: hemodynamically stable  Respiratory status: acceptable  Hydration status: euvolemic    PONV: none

## 2020-10-19 NOTE — ANESTHESIA TIME REPORT
Anesthesia Start and Stop Event Times     Date Time Event    10/19/2020 1238 Ready for Procedure     1328 Anesthesia Start     1544 Anesthesia Stop        Responsible Staff  10/19/20    Name Role Begin End    Andrea Hyde M.D. Anesth 1328 1544        Preop Diagnosis (Free Text):  Pre-op Diagnosis     HIP PAIN RIGHT, PAIN DUE TO OTHER INTERNAL PROSTHETIC DEVICES, HX OF HIP REPLACEMENT RIGHT        Preop Diagnosis (Codes):    Post op Diagnosis  Pain due to right hip joint prosthesis (HCC)  right hip pain with history of previous right total hip arthroplasty     Premium Reason  A. 3PM - 7AM    Comments:

## 2020-10-19 NOTE — OR NURSING
"1655: Received Patient Care Report from SUGEY Sweet, and assumed care.     1658: Patient is having pain at 8/10 described as burning. Patient has a very aggressive demeanor and the plan is to manage his pain to a tolerable level.     1705: Patient is rating his pain at 7/10 and described as burning pain. Plan to medicate per MAR.    1720: Patient rates pain at a, \"High four\". 4/10 but also states, \"I don't think your numbers thing works for me!\" Patient says he isn't sure he will get to a, \"Tolerable level of pain\". Lots of verbal explanation and redirection to keep him on track is required, otherwise he gets himself to where he is angry and non-cooperative. Plan is to treat per MAR and get patient to a level of comfort that is acceptable and or tolerable as he is not wanting to participate in rating pain appropriately. Patient refused to change over to a nasal cannula. Patient moves mask away and has to be guided to replace his mask to his face.     1725: Patient rated pain at 5/10, Patient is starting to relax and dose off.    1730: Patient is sleeping but rouses easily, when awakened the first words out of his mouth are, \"The pain is going up!\" The patient is currently grimace free and is snoring while sleeping, his SpO2 level is 100% on supplemental oxygen via mask at 7 lpm. Dept. Supervisor Apryl is aware of the situation and is at bedside at the moment for a quick update.    1735: Patient is sleeping, rouse able, no grimace at present time. Report called to SUGEY Parisi, Patient will be going to room 218.    1745: Patient sleeping, Plan to hold in PACU  Longer until rouse-able.    1755: Patient sleeping. SpO2 at 99%, Respirations at 16/minute. No change.    1805: Patient sleeping, SpO2 99%, Respirations 16/minute and /68.    1810: No Changes.    1815: Wife Yamile updated by telephone.    1820: No changes    1830: No Changes    1840: No changes, Patient sleeping.     1850: No Changes, Dr. Hyde updated " by telephone. Per Dr. Hyde, Just let him sleep and move him to [facility] when he is rouse-able.    1900: No changes, wife updated by phone.    1910: Pain at 3/10 and tolerable. Patient sitting up drinking soda. Patient meets criteria to go to GSU. Report to SUGEY Molina    1920: Dr. Hyde Updated. No further.

## 2020-10-19 NOTE — OR NURSING
"1543- Patient arrived from OR post REVISION, TOTAL ARTHROPLASTY, HIP - Right. Respirations spontaneous and unlabored via oral airway. VSS, see flowsheets. Surgical dressing to R hip C/D/I with adequate seal. 2+ pedal pulses bilaterally. Toes warm and pink with brisk cap refill.   1607- Patient more awake, reporting 10/10 pain. Medicated per MAR.   1621- Patient resting with eyes closed and audible snoring.  O2 saturation decreased to 87-90% on 6L mask.   1632- When aroused, patient reporting no improvement in pain. Medicated per MAR. Patient educated on respiratory safety with pain medication administration. Patient aggressively verbalizes to RN that \"I'm in a lot of pain. This isn't my first rodeo I've had four surgeries on this hip alone.\" Active listening and ressurance provided.   1640- Pain reassessment completed. Patient states \"it hurts a lot\" RN inquired if there is any improvement after pain medication administeration. Patient verbalizes frustration that \"I've never hurt like this after a surgery you are doing something wrong.\"   1650- Pain reassessed. Patient states \"it's a little bit better but it still hurts like hell.\" Patient verbalizes frustration that \"I have never hurt like this before. You haven't done anything for me.\" Active listening performed. RN apologized for discomfort RN tried to discuss pain goal for patient. Patient begins yelling at RN stating \"I'm not stupid. I shouldn't have to sit in pain like this. You aren't helping me. Go get your supervisor. I don't want you anywhere near me.\"  1652- RN supervisor at bedside.   1655- Report to Sanjay MAYES.   "

## 2020-10-20 LAB
ANION GAP SERPL CALC-SCNC: 10 MMOL/L (ref 7–16)
BUN SERPL-MCNC: 27 MG/DL (ref 8–22)
CALCIUM SERPL-MCNC: 8 MG/DL (ref 8.4–10.2)
CHLORIDE SERPL-SCNC: 101 MMOL/L (ref 96–112)
CO2 SERPL-SCNC: 17 MMOL/L (ref 20–33)
CREAT SERPL-MCNC: 1.34 MG/DL (ref 0.5–1.4)
ERYTHROCYTE [DISTWIDTH] IN BLOOD BY AUTOMATED COUNT: 46.6 FL (ref 35.9–50)
GLUCOSE SERPL-MCNC: 186 MG/DL (ref 65–99)
HCT VFR BLD AUTO: 41.5 % (ref 42–52)
HGB BLD-MCNC: 13.7 G/DL (ref 14–18)
MCH RBC QN AUTO: 29.8 PG (ref 27–33)
MCHC RBC AUTO-ENTMCNC: 33 G/DL (ref 33.7–35.3)
MCV RBC AUTO: 90.2 FL (ref 81.4–97.8)
PLATELET # BLD AUTO: 211 K/UL (ref 164–446)
PMV BLD AUTO: 10.1 FL (ref 9–12.9)
POTASSIUM SERPL-SCNC: 4.5 MMOL/L (ref 3.6–5.5)
RBC # BLD AUTO: 4.6 M/UL (ref 4.7–6.1)
SODIUM SERPL-SCNC: 128 MMOL/L (ref 135–145)
WBC # BLD AUTO: 16.4 K/UL (ref 4.8–10.8)

## 2020-10-20 PROCEDURE — A9270 NON-COVERED ITEM OR SERVICE: HCPCS | Performed by: ORTHOPAEDIC SURGERY

## 2020-10-20 PROCEDURE — 80048 BASIC METABOLIC PNL TOTAL CA: CPT

## 2020-10-20 PROCEDURE — 36415 COLL VENOUS BLD VENIPUNCTURE: CPT

## 2020-10-20 PROCEDURE — 85027 COMPLETE CBC AUTOMATED: CPT

## 2020-10-20 PROCEDURE — 97161 PT EVAL LOW COMPLEX 20 MIN: CPT

## 2020-10-20 PROCEDURE — 770001 HCHG ROOM/CARE - MED/SURG/GYN PRIV*

## 2020-10-20 PROCEDURE — 97165 OT EVAL LOW COMPLEX 30 MIN: CPT

## 2020-10-20 PROCEDURE — 700102 HCHG RX REV CODE 250 W/ 637 OVERRIDE(OP): Performed by: ORTHOPAEDIC SURGERY

## 2020-10-20 PROCEDURE — 700111 HCHG RX REV CODE 636 W/ 250 OVERRIDE (IP): Performed by: ORTHOPAEDIC SURGERY

## 2020-10-20 PROCEDURE — 700111 HCHG RX REV CODE 636 W/ 250 OVERRIDE (IP)

## 2020-10-20 RX ADMIN — KETOROLAC TROMETHAMINE 30 MG: 30 INJECTION, SOLUTION INTRAMUSCULAR at 02:02

## 2020-10-20 RX ADMIN — KETOROLAC TROMETHAMINE 30 MG: 30 INJECTION, SOLUTION INTRAMUSCULAR at 15:57

## 2020-10-20 RX ADMIN — ASPIRIN 81 MG: 81 TABLET, COATED ORAL at 23:19

## 2020-10-20 RX ADMIN — ASPIRIN 81 MG: 81 TABLET, COATED ORAL at 13:00

## 2020-10-20 RX ADMIN — OXYCODONE 5 MG: 5 TABLET ORAL at 13:24

## 2020-10-20 RX ADMIN — DOCUSATE SODIUM 100 MG: 100 CAPSULE, LIQUID FILLED ORAL at 05:55

## 2020-10-20 RX ADMIN — DOCUSATE SODIUM 100 MG: 100 CAPSULE, LIQUID FILLED ORAL at 16:54

## 2020-10-20 RX ADMIN — ACETAMINOPHEN 1000 MG: 500 TABLET, FILM COATED ORAL at 23:19

## 2020-10-20 RX ADMIN — OXYCODONE 5 MG: 5 TABLET ORAL at 09:13

## 2020-10-20 RX ADMIN — KETOROLAC TROMETHAMINE 30 MG: 30 INJECTION, SOLUTION INTRAMUSCULAR at 09:13

## 2020-10-20 RX ADMIN — ACETAMINOPHEN 1000 MG: 500 TABLET, FILM COATED ORAL at 15:56

## 2020-10-20 RX ADMIN — ACETAMINOPHEN 1000 MG: 500 TABLET, FILM COATED ORAL at 02:02

## 2020-10-20 RX ADMIN — ACETAMINOPHEN 1000 MG: 500 TABLET, FILM COATED ORAL at 09:13

## 2020-10-20 RX ADMIN — SENNOSIDES-DOCUSATE SODIUM TAB 8.6-50 MG 1 TABLET: 8.6-5 TAB at 20:06

## 2020-10-20 RX ADMIN — OXYCODONE 5 MG: 5 TABLET ORAL at 20:07

## 2020-10-20 RX ADMIN — CELECOXIB 200 MG: 200 CAPSULE ORAL at 23:19

## 2020-10-20 RX ADMIN — ASPIRIN 81 MG: 81 TABLET, COATED ORAL at 02:02

## 2020-10-20 RX ADMIN — OXYCODONE 5 MG: 5 TABLET ORAL at 06:01

## 2020-10-20 RX ADMIN — OXYCODONE 5 MG: 5 TABLET ORAL at 16:54

## 2020-10-20 RX ADMIN — OXYCODONE 5 MG: 5 TABLET ORAL at 23:19

## 2020-10-20 ASSESSMENT — GAIT ASSESSMENTS
GAIT LEVEL OF ASSIST: SUPERVISED
DISTANCE (FEET): 200
ASSISTIVE DEVICE: CRUTCHES
DEVIATION: ANTALGIC;STEP TO

## 2020-10-20 ASSESSMENT — COGNITIVE AND FUNCTIONAL STATUS - GENERAL
DRESSING REGULAR LOWER BODY CLOTHING: A LITTLE
TOILETING: A LITTLE
MOVING FROM LYING ON BACK TO SITTING ON SIDE OF FLAT BED: A LITTLE
HELP NEEDED FOR BATHING: A LITTLE
SUGGESTED CMS G CODE MODIFIER DAILY ACTIVITY: CI
SUGGESTED CMS G CODE MODIFIER MOBILITY: CI
DAILY ACTIVITIY SCORE: 23
CLIMB 3 TO 5 STEPS WITH RAILING: A LITTLE
DAILY ACTIVITIY SCORE: 20
MOBILITY SCORE: 23
MOVING TO AND FROM BED TO CHAIR: A LITTLE
WALKING IN HOSPITAL ROOM: A LITTLE
STANDING UP FROM CHAIR USING ARMS: A LITTLE
DRESSING REGULAR UPPER BODY CLOTHING: A LITTLE
DRESSING REGULAR LOWER BODY CLOTHING: A LITTLE
MOBILITY SCORE: 19
SUGGESTED CMS G CODE MODIFIER DAILY ACTIVITY: CJ
CLIMB 3 TO 5 STEPS WITH RAILING: A LITTLE
SUGGESTED CMS G CODE MODIFIER MOBILITY: CK

## 2020-10-20 ASSESSMENT — ACTIVITIES OF DAILY LIVING (ADL): TOILETING: INDEPENDENT

## 2020-10-20 ASSESSMENT — PAIN DESCRIPTION - PAIN TYPE
TYPE: ACUTE PAIN;SURGICAL PAIN
TYPE: SURGICAL PAIN
TYPE: ACUTE PAIN;SURGICAL PAIN
TYPE: ACUTE PAIN;SURGICAL PAIN

## 2020-10-20 NOTE — OP REPORT
DATE OF SERVICE:  10/19/2020    PREOPERATIVE DIAGNOSIS:  Painful revision right total hip arthroplasty.    POSTOPERATIVE DIAGNOSIS:  Painful revision right total hip arthroplasty.    PROCEDURE:  Revision right total hip arthroplasty.    SURGEON:  Emigdio Sales MD    ASSISTANT:  Ted Ang MD    ANESTHESIA:  General.    COMPLICATIONS:  None.    INDICATIONS:  The patient is a 60-year-old man who had undergone prior   metal-on-metal right total hip arthroplasty and subsequent revision surgery,   but developed persistent anterior hip pain consistent with anterior hip   impingement and psoas tendonitis.  Symptoms persisted despite appropriate   conservative therapy and the patient was admitted for elective revision right   total hip arthroplasty.    PROCEDURE:  After induction of satisfactory anesthesia, the patient was   positioned in the lateral decubitus position on his left side.  An axillary   roll was placed and all bony prominences were well padded.  The patient was   secured in the lateral decubitus position with use of a beanbag and side rest.    The right hip and lower extremity was prepped and draped in the usual   sterile fashion.  Intravenous antibiotics were administered, and after the   usual prep and drape, a longitudinal incision was made through the prior   lateral right hip scar.  The incision was carried down through skin and   subcutaneous tissue to the level of the fascia, which was opened in line with   the skin incision and the gluteus mei were muscle fibers split bluntly at   the proximal end of the wound.  The fascial layer was relatively adherent to   the deep muscle layer and this was  by dissection to perform the   muscular planes and placed a Charnley retractor to allow exposure of the hip.    The femur was gently internally rotated and capsular and scar tissue   attachments were removed from the posterior greater trochanter and proximal   femur.  The scar was found to  be relatively thickened and areas of heterotopic   bone were identified posteriorly along the posterior greater trochanter and   posterior capsule as well as the anterior areas of the hip.  This heterotopic   bone was excised circumferentially and the sciatic nerve identified by   palpation and protected throughout the procedure.  The posterior capsule was   opened and the hip dislocated posteriorly.  The modular femoral head was   removed and the taper of the femoral component found to be in good condition.    The femoral component was found to be well fixed and retained.  There was   scar tissue overgrowing the proximal portion of the prosthesis and neck, which   was dissected to visualize the metal and allow dissection and removal of the   anterior and inferior capsule.  The iliopsoas tendon was identified and   preserved.  The hip was relatively stiff and required dissection of the linea   aspera along the posterior femur to allow separation of the tissues in order   to expose the hip adequately.  Adequate hemostasis was maintained with   electrocautery as well as a bipolar sealer and the proximal femur then   retracted anteriorly where additional heterotopic bone was found along the   anterior hip consistent with the patient's symptoms of anterior impingement   and the heterotopic bone and scar tissue in this area thoroughly excised.  In   this fashion, the acetabular shell could be well visualized and the liner was   removed.  The shell was well fixed and the 2 screws securing the shell removed   with an appropriate screwdriver.  The liner was then reintroduced and the   explant system used with the appropriate size short and long osteotomes to   circumferentially divide the implant bone interface preserving bone stock as   much as feasible and removed the acetabular shell atraumatically in this   fashion.  The remaining bone was then prepared for implantation of revision   component with use of sequentially  increasing size reamers.  Reaming was   carried out in 1-2 mm increments up to a 69 mm diameter, which provided   hemispheric shape to the acetabular cavity while preserving adequate bone for   revision with a cementless shell and screw fixation.  The cavity was irrigated   and additional impinging anterior bone removed to mitigate risk of anterior   impingement and psoas tendonitis.  A small cavitary cyst was identified within   the acetabulum, posteriorly and this was packed with 15 mm of morselized bone   graft after it had been thawed and transferred from a frozen state to body   temperature.  The shell selected was a 70 mm LYLE shell, which was then   positioned carefully with the apex superiorly in appropriate abduction and   anteversion and impacted into position with an excellent fit.  Fixation was   augmented with 2 dome and 2 peripheral screws superiorly and firm fixation   obtained with the multiple screw fixation.  The shell was irrigated and dried   and the MDM liner selected the appropriate size and inserted into the liner   locking mechanism.  This was impacted into position and carefully checked   manually and visually to ensure that the liner locking mechanism had been   properly engaged.  The hip was then checked with a series of trial heads and a   +6 mm head selected with the dual mobility liner, which was the same size as   the previously implanted head.  The +6 head was assembled on to the   polyethylene dual mobility liner on back table and then impacted onto the   Vela taper of the femoral component after it had been cleaned and dried and   the hip reduced again with the final components in place.  The hip was checked   and there was no impingement at the extremes of flexion, adduction, and   internal rotation or extension and external rotation.  The wound was irrigated   with dilute Betadine followed by pulsatile saline lavaging the deep muscular   layer of the linea aspera and vastus  lateralis as well as posterior border of   the abductor layer repaired to the posterior deep soft tissue layer with   interrupted #1 Ti-Cron sutures.  The fascial layer was closed with a running   #1 Quill suture and skin closed in layers with #1 and 2-0 Vicryl sutures.  The   skin was closed with staples and a sterile Prevena wound VAC applied.  The   patient tolerated the procedure well and returned to recovery room in stable   condition.       ____________________________________     MD MILDRED Medina / CHRISTEN    DD:  10/19/2020 15:37:35  DT:  10/19/2020 17:47:16    D#:  [id number]  Job#:  [id number]

## 2020-10-20 NOTE — THERAPY
Occupational Therapy   Initial Evaluation     Patient Name: Samir Godinez  Age:  60 y.o., Sex:  male  Medical Record #: [id number]  Today's Date: 10/20/2020     Precautions  Precautions: Posterior Hip Precautions, Weight Bearing As Tolerated Right Lower Extremity  Comments: s/p R NATACHA revision    Assessment    Patient is 60 y.o. male with a diagnosis of failed and painful R NATACHA, now POD#1 R NATACHA revision - posterior approach. 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 Pt did take time off from work for sx. He has a son and a girlfriend who are both willing to assist with PRN ADL/IADL needs upon dc home, however, they are both only available at night time (working during the day). Pt was instructed in posterior hip precautions, post-op care, dressing techniques, and use of appropriate adaptive equipments to ensure safe and independent LB dressing. Pt tolerated session well, was agreeable with most techniques, though did require cues for proper sequencing and correct use of AEs. Pt will benefit from home health OT for continued services for home safety, as he will be alone during the day. He has had Home Health services in the past. No acute OT needs at this time.    Plan    Recommend Occupational Therapy for Evaluation only .    DC Equipment Recommendations: Other (Comments)(hip kit)  Discharge Recommendations: Recommend home health for continued occupational therapy services        10/20/20 1049   Prior Living Situation   Prior Services Skilled Home Health Services   Housing / Facility 1 Story House   Steps Into Home 3   Steps In Home 0   Bathroom Set up Walk In Shower;Grab Bars   Equipment Owned Single Point Cane;Hand Held Shower;Grab Bar(s) In Tub / Shower;Sock Aid;Reacher   Lives with - Patient's Self Care Capacity Alone and Able to Care For Self   Comments pt lives alone, but has his girlfriend and son who are able to come in at night for any  assistance. pt will be home alone during the day   Prior Level of ADL Function   Self Feeding Independent   Grooming / Hygiene Independent   Bathing Independent   Dressing Independent   Toileting Independent   Prior Level of IADL Function   Medication Management Independent   Laundry Independent   Kitchen Mobility Independent   Finances Independent   Home Management Independent   Shopping Independent   Prior Level Of Mobility Independent Without Device in Home;Independent Without Device in Community   Driving / Transportation Driving Independent   Occupation (Pre-Hospital Vocational) Employed Full Time   Pain 0 - 10 Group   Location Hip   Location Orientation Right   Pain Rating Scale (NPRS) 4   Description Aching   Comfort Goal Comfort with Movement;Sleep Comfortably   Therapist Pain Assessment Post Activity Pain Same as Prior to Activity   Bed Mobility    Supine to Sit Supervised   Sit to Supine Supervised   Scooting Supervised   Comments HOB elevated, rails up   ADL Assessment   Grooming Standing;Supervision   Upper Body Dressing   (pt dressed in regular clothing from last night)   Lower Body Dressing   (pt dressed in regular clothing from last night)   Toileting Supervision   Functional Mobility   Sit to Stand Supervised   Bed, Chair, Wheelchair Transfer Supervised   Toilet Transfers Supervised   Transfer Method Stand Step   Mobility in room with bilateral crutches   Anticipated Discharge Equipment and Recommendations   DC Equipment Recommendations Other (Comments)  (hip kit)   Discharge Recommendations Recommend home health for continued occupational therapy services

## 2020-10-20 NOTE — PROGRESS NOTES
Pt arrived via hospital bed, admitted to room 218 from PACU at 1930. Pt is A&Ox4, c/o pain reported at R hip 3 on a scale of 0-10. Oriented to room call light and smoking policy. Reviewed plan of care with the patient. Fall precaution in place. Bed locked. Bet at lowest position. Call light within reach. Encouraged pt the importance to call for assistance.       Assumed pt care at 1930. Pt is A&Ox4, resting comfortably in bed. Pt on 7L blow by O2 via oxy mask. No signs of SOB/respiratory distress. Assessment completed, Labs noted, VSS. Prevena dressing to R hip in place CDI, no drainage observed in cannister, +CMS, + pulse, able to wiggle toes and dorsi/plantar flex. Ice pack in place & SCDs on BLE.  Educated pt the importance to call for assistance. Fall precautions in place. Bed at lowest position. Bed locked. Call light and personal belongings within reach.

## 2020-10-20 NOTE — PROGRESS NOTES
"RN encouraged pt to get up and walk, pt states \"no way, not happening\". RN educated pt on benefits of mobility soon after surgery. Pt asking RN to come back at 2330 to see if he is ready. Will return and encourage patient to move. Pt is performing dorsi/plantar flexion in bed at this time.   "

## 2020-10-20 NOTE — CARE PLAN
Problem: Safety  Goal: Will remain free from injury  Outcome: PROGRESSING AS EXPECTED  Goal: Will remain free from falls  Outcome: PROGRESSING AS EXPECTED  --> Pt is wearing treaded socks, bed is locked and in lowest position, pt uses call light appropriately     Problem: Venous Thromboembolism (VTW)/Deep Vein Thrombosis (DVT) Prevention:  Goal: Patient will participate in Venous Thrombosis (VTE)/Deep Vein Thrombosis (DVT)Prevention Measures  Outcome: PROGRESSING AS EXPECTED  --> Pt is wearing SCD's, performing dorsi/plantar flexion while in bed

## 2020-10-20 NOTE — THERAPY
"Physical Therapy   Initial Evaluation     Patient Name: Samir Godinez  Age:  60 y.o., Sex:  male  Medical Record #: [id number]  Today's Date: 10/20/2020     Precautions: (P) Posterior Hip Precautions, Weight Bearing As Tolerated Right Lower Extremity    Assessment  Patient is 60 y.o. male who was recently seen s/p revision of R NATACHA with orders to be WBAT for RLE and posterior hip precautions. Pt was agreeable to therapy evaluation. Pt was able to demonstrate safe functional ambulation and navigation of stairs throughout session. Pt was able to correctly use crutches safely with good mechanics and no guzman LOB. Pt states he still does not feel ready to d/c home today, despite demonstrating safe functional mobility with no adverse events or LOB. Pt was able to go up/down steps using B railing, however, declined to attempt with crutches as he states, \" I know how to go up/down steps with crutches, I don't need to do it again\". Pt provided with education on how to use crutches up/down steps. Pt provided with education on therapeutic exercises, elevation, icing, and positioning. Pt is in no acute skilled PT needs at this time, anticipate pt to d/c home with crutches and recs for HH therapy services as the patient will be home alone during the day.     Plan    Recommend Physical Therapy for Evaluation only    DC Equipment Recommendations: (P) Crutches  Discharge Recommendations: (P) Recommend home health for continued physical therapy services    Objective       10/20/20 1000   Prior Living Situation   Prior Services None   Housing / Facility 1 Story House   Steps Into Home 2   Steps In Home 0   Equipment Owned Single Point Cane   Lives with - Patient's Self Care Capacity Alone and Able to Care For Self   Comments pt states son and his girlfriend will be able to assist as night, however, will be alone during the day    Prior Level of Functional Mobility   Bed Mobility Independent   Transfer Status Independent "   Ambulation Independent   Distance Ambulation (Feet)   (community )   Assistive Devices Used None   Stairs Independent   Comments pt reports of an IPLOF   Gait Analysis   Gait Level Of Assist Supervised   Assistive Device Crutches   Distance (Feet) 200   # of Times Distance was Traveled 1   Deviation Antalgic;Step To   # of Stairs Climbed 2   Level of Assist with Stairs Supervised   Weight Bearing Status WBAT RLE    Comments pt was able to demonstrate safe functional mobility with crutches; pt declined to go up/down steps with crutches, however, was able to go up/down with railings use; pt states he has no concerns with going up/down steps with crutches as he has done it in the past; pt provided with education how to go up/down steps with crutches   Bed Mobility    Supine to Sit Supervised   Sit to Supine Supervised   Scooting Supervised   Rolling Supervised   Functional Mobility   Sit to Stand Supervised   Bed, Chair, Wheelchair Transfer Supervised   Comments cues for posterior hip precautions and mechanics during sit<>stands

## 2020-10-20 NOTE — PROGRESS NOTES
2 RN skin assessment done; skin WDL except surgical incision to R hip. Prevena wound vac in place, CDI.

## 2020-10-20 NOTE — PROGRESS NOTES
"Orthopaedic Progress Note  POD1 s/p revision right total hip replacement    Patient seen and examined this AM.  Reports a good night. Well controlled pain. Ambulating to bathroom.  No abraham catheter  Denies fevers, chills, chest pain   Passing gas, no bowel movement yet.     Recent Labs     10/20/20  0400   WBC 16.4*   RBC 4.60*   HEMOGLOBIN 13.7*   HEMATOCRIT 41.5*   MCV 90.2   MCH 29.8   MCHC 33.0*   RDW 46.6   PLATELETCT 211   MPV 10.1       Examination:   /53   Pulse 69   Temp 36.6 °C (97.9 °F) (Axillary)   Resp 16   Ht 1.88 m (6' 2\")   Wt 110 kg (242 lb 8.1 oz)   SpO2 94%     No acute distress  Breathing unlabored  Dressing clean dry and intact   Motor: fires ehl, tibialis anterior, gastrocnemius 5/5 strength  Sensation intact to light touch sural, saphenous, superficial peroneal, deep peroneal, tibial distributions  Toes warm and well perfused, cap refill <2      A/P: POD#1 s/p revision R NATACHA. Doing well post-operatively    DVT Prophylaxis- TEDS/SCDs, aspirin 81mg twice daily  Weight Bearing Status-as tolerated  PT/OT  Antibiotics: x2 doses post-op  Case Coordination    Ted Ang MD  [facility]        "

## 2020-10-20 NOTE — PROGRESS NOTES
"Pt standing, marching in place for 10 minutes with RN and CNA assist and FWW. Pt refusing to take steps at this time. RN continuously encouraging mobility.     0200: Pt voided 75ml. RN encouraging fluid intake and mobility. Pt is refusing bladder scan at this time stating \"I'm not worried about it. Give me some time\". Pt educated on risks/complications associated with urinary retention.     0215: Pt ambulated 400ft with RN and FWW. Steady gait. RN continues to encourage mobility    0400: Pt walked to bathroom with FWW and standby assist, voided 300ml suzie urine, RN encouraging hydration    0415: Pt walked 400ft in hallway with FWW and standby assist  "

## 2020-10-21 ENCOUNTER — APPOINTMENT (OUTPATIENT)
Dept: RADIOLOGY | Facility: MEDICAL CENTER | Age: 60
DRG: 468 | End: 2020-10-21
Attending: ORTHOPAEDIC SURGERY
Payer: COMMERCIAL

## 2020-10-21 PROCEDURE — A9270 NON-COVERED ITEM OR SERVICE: HCPCS | Performed by: ORTHOPAEDIC SURGERY

## 2020-10-21 PROCEDURE — 770001 HCHG ROOM/CARE - MED/SURG/GYN PRIV*

## 2020-10-21 PROCEDURE — 93971 EXTREMITY STUDY: CPT | Mod: RT

## 2020-10-21 PROCEDURE — 700102 HCHG RX REV CODE 250 W/ 637 OVERRIDE(OP): Performed by: ORTHOPAEDIC SURGERY

## 2020-10-21 RX ADMIN — ACETAMINOPHEN 1000 MG: 500 TABLET, FILM COATED ORAL at 05:46

## 2020-10-21 RX ADMIN — CELECOXIB 200 MG: 200 CAPSULE ORAL at 05:46

## 2020-10-21 RX ADMIN — OXYCODONE HYDROCHLORIDE 10 MG: 10 TABLET ORAL at 20:13

## 2020-10-21 RX ADMIN — ASPIRIN 81 MG: 81 TABLET, COATED ORAL at 13:16

## 2020-10-21 RX ADMIN — ACETAMINOPHEN 1000 MG: 500 TABLET, FILM COATED ORAL at 20:12

## 2020-10-21 RX ADMIN — ACETAMINOPHEN 1000 MG: 500 TABLET, FILM COATED ORAL at 13:16

## 2020-10-21 RX ADMIN — OXYCODONE HYDROCHLORIDE 10 MG: 10 TABLET ORAL at 17:09

## 2020-10-21 RX ADMIN — OXYCODONE 5 MG: 5 TABLET ORAL at 14:11

## 2020-10-21 RX ADMIN — OXYCODONE 5 MG: 5 TABLET ORAL at 03:11

## 2020-10-21 RX ADMIN — CELECOXIB 200 MG: 200 CAPSULE ORAL at 17:09

## 2020-10-21 RX ADMIN — OXYCODONE HYDROCHLORIDE 10 MG: 10 TABLET ORAL at 23:26

## 2020-10-21 RX ADMIN — OXYCODONE 5 MG: 5 TABLET ORAL at 10:33

## 2020-10-21 RX ADMIN — OXYCODONE 5 MG: 5 TABLET ORAL at 06:22

## 2020-10-21 ASSESSMENT — PAIN DESCRIPTION - PAIN TYPE
TYPE: SURGICAL PAIN
TYPE: SURGICAL PAIN

## 2020-10-21 NOTE — PROGRESS NOTES
Left second message with MA. Called back and left message with answering service. Waiting for call back

## 2020-10-21 NOTE — PROGRESS NOTES
"Orthopaedic Progress Note  POD2 s/p revision right total hip replacement    Patient seen and examined this AM.  Reports a good night. Well controlled pain. Ambulating with less pain  No abraham catheter  Denies fevers, chills, chest pain   Passing gas, tolerating diet    Recent Labs     10/20/20  0400   WBC 16.4*   RBC 4.60*   HEMOGLOBIN 13.7*   HEMATOCRIT 41.5*   MCV 90.2   MCH 29.8   MCHC 33.0*   RDW 46.6   PLATELETCT 211   MPV 10.1       Examination:   /53   Pulse 70   Temp 36.6 °C (97.9 °F) (Oral)   Resp 18   Ht 1.88 m (6' 2\")   Wt 110 kg (242 lb 8.1 oz)   SpO2 91%     No acute distress  Breathing unlabored  Dressing clean dry and intact   Motor: fires ehl, tibialis anterior, gastrocnemius 5/5 strength  Sensation intact to light touch sural, saphenous, superficial peroneal, deep peroneal, tibial distributions  Toes warm and well perfused, cap refill <2      A/P: POD#2 s/p revision R NATACHA. Doing well post-operatively    DVT Prophylaxis- TEDS/SCDs, aspirin 81mg twice daily  Weight Bearing Status-as tolerated, posterior hip precautions  Continue PT/OT  Case Coordination, anticipate d/c home today    Ted Ang MD  [facility]        "

## 2020-10-21 NOTE — PROGRESS NOTES
Procedure:  EGD / Colonoscopy with possible HET   Dx:   Abdominal pain (chronic, epigastric) / Bright red blood per rectum / Melena / Early satiety / Occult blood positive in stool   Ordering Provider:  JOSE Werner  RT? (order if BMI over 40 and/or if patient has sleep apnea):  No  Is the patient on blood thinners? (warfarin, coumadin, plavix, or xarelto; if yes, patient will need to check with prescribing doctor to determine if they can hold prior to procedure):  No      Will patient have ?: Yes  Prep instructions sent to patient via: [facility]      For Colonoscopies: (if not having colonoscopy; delete this section):    Do you suffer from constipation? (If yes, order 2 day colon prep):     Does patient have any kidney issues (check last creatinine level (if >1.2, then use double Miralax prep).    Has the patient ever had a colonoscopy? No    Family history of colon polyps/colon cancer? No    Patient gave verbal consent to talk to his wife. Procedures reschedule to 2/4/2019 at 1030 am, arrive by 0945 am. New prep instructions was e-mail to patient's [facility]. Surgery scheduling was notify.    Report given to Day RN.

## 2020-10-21 NOTE — CARE PLAN
Problem: Safety  Goal: Will remain free from injury  Outcome: PROGRESSING AS EXPECTED  Note: Pt educated to call assistance prior to ambulating to bathroom.      Problem: Bowel/Gastric:  Goal: Normal bowel function is maintained or improved  Outcome: PROGRESSING AS EXPECTED  Note: Pt educated to take stool softeners with narcotics to prevent constipation.

## 2020-10-21 NOTE — DISCHARGE SUMMARY
Samir Godinez was admitted on 10/19/2020 for revision of right total hip arthroplasty by Dr Sales on the date of admission. Please see dictated operative note for further information.    Hospital course:     The patient has done well, with no complications. He required additional PT due to pain and difficulty mobilizing, for which he required additional inpatient days.   Patient denies chest pain, calf pain or shortness of breath.   Pain is well-controlled at present.  Patient is ambulating well with the use of an assistive device, and progressing in physical therapy.   Patient is neurologically and vascularly intact with palpable pedal pulses bilaterally.   Narcotic dosages were chosen by taking into account the the patient's previous history of opoid use and to ensure proper pain control after surgery    Discharge date: 10/22/20    Patient is being discharged to home after am physical therapy.     Allergies:  Patient has no known allergies.       Medication List      START taking these medications      Instructions   aspirin EC 81 MG Tbec  Commonly known as: ECOTRIN   Take 1 Tab by mouth 2 Times a Day.  Dose: 81 mg     oxyCODONE immediate-release 5 MG Tabs  Commonly known as: ROXICODONE   Take 1 Tab by mouth every four hours as needed for Severe Pain for up to 7 days.  Dose: 5 mg        CONTINUE taking these medications      Instructions   acetaminophen 500 MG Tabs  Commonly known as: TYLENOL   Take 500-1,000 mg by mouth every 6 hours as needed.  Dose: 500-1,000 mg     ibuprofen 800 MG Tabs  Commonly known as: MOTRIN   Take 2,400 mg by mouth every 8 hours as needed. Takes 3 tabs  Dose: 2,400 mg            Discharge Instructions:     Patient is instructed to ambulate and weight bear as tolerated with the use of an assistive device, and to continue physical therapy exercises given during this hospital stay. Strict posterior hip precautions are to be observed for patients who underwent a total hip  replacement.   Patient was given detailed wound care instructions, and will leave the Incisional vac or silver dressing on until first post-op visit.   Aspirin 81mg twice daily for DVT prophylaxis.  Patient is to follow up with Dr. Sales' office in 1-2 weeks.

## 2020-10-21 NOTE — PROGRESS NOTES
"Pt reports increased swelling and pain in Right knee. He states \"something is not right\" \"its hurting a lot and Its very stiff all of a sudden\". Ice has been on right hip most of the day. Pt has ambulated X3 today. Urine output adequate. No other needs. Left message with Dr. Ld MORLEY. Roberth for call back  "

## 2020-10-22 VITALS
SYSTOLIC BLOOD PRESSURE: 135 MMHG | OXYGEN SATURATION: 93 % | BODY MASS INDEX: 31.12 KG/M2 | HEART RATE: 65 BPM | TEMPERATURE: 98 F | DIASTOLIC BLOOD PRESSURE: 58 MMHG | HEIGHT: 74 IN | RESPIRATION RATE: 16 BRPM | WEIGHT: 242.51 LBS

## 2020-10-22 PROCEDURE — A9270 NON-COVERED ITEM OR SERVICE: HCPCS | Performed by: ORTHOPAEDIC SURGERY

## 2020-10-22 PROCEDURE — 700102 HCHG RX REV CODE 250 W/ 637 OVERRIDE(OP): Performed by: ORTHOPAEDIC SURGERY

## 2020-10-22 RX ADMIN — CELECOXIB 200 MG: 200 CAPSULE ORAL at 05:14

## 2020-10-22 RX ADMIN — ACETAMINOPHEN 1000 MG: 500 TABLET, FILM COATED ORAL at 13:15

## 2020-10-22 RX ADMIN — ASPIRIN 81 MG: 81 TABLET, COATED ORAL at 02:34

## 2020-10-22 RX ADMIN — OXYCODONE HYDROCHLORIDE 10 MG: 10 TABLET ORAL at 13:15

## 2020-10-22 RX ADMIN — ACETAMINOPHEN 1000 MG: 500 TABLET, FILM COATED ORAL at 05:14

## 2020-10-22 RX ADMIN — OXYCODONE HYDROCHLORIDE 10 MG: 10 TABLET ORAL at 16:21

## 2020-10-22 RX ADMIN — OXYCODONE HYDROCHLORIDE 10 MG: 10 TABLET ORAL at 02:34

## 2020-10-22 RX ADMIN — OXYCODONE HYDROCHLORIDE 10 MG: 10 TABLET ORAL at 05:18

## 2020-10-22 RX ADMIN — ASPIRIN 81 MG: 81 TABLET, COATED ORAL at 13:15

## 2020-10-22 RX ADMIN — OXYCODONE HYDROCHLORIDE 10 MG: 10 TABLET ORAL at 09:43

## 2020-10-22 ASSESSMENT — PAIN DESCRIPTION - PAIN TYPE
TYPE: SURGICAL PAIN
TYPE: SURGICAL PAIN;INTRACTABLE PAIN

## 2020-10-22 NOTE — PROGRESS NOTES
Received report, assumed pt care. Discussed POC. Pt ambulated halls with SBA and crutches. VSS. Will cont to monitor.

## 2020-10-22 NOTE — PROGRESS NOTES
MD in to see pt; d/c orders received. IV dc'd. Discharge instructions given; pt and family verbalized understanding and questions answered. Prescriptions with pt. Pt dc'd in w/c with hospital escort at 1635.

## 2020-10-22 NOTE — PROGRESS NOTES
Received report from day shift nurse.   ssumed pt care   Pt is A&Ox4, resting comfortably in chair.  Pt on r.a  No signs of SOB/respiratory distress.   Assessment completed, Labs noted, VSS.   Oxycodone 10mg given per MAR for c/o surgical pain/R-hip  Surgical dressing/Prevena to R- hip in place CDI, +CMS, + pulse, able to wiggle toes and dorsi/plantar flex. Denied numbness/tingling   Polar ice & SCDs on.    ducated pt the importance to call for assistance.   Fall precautions in place.   Bed locked & at lowest position.   Call light and personal belongings within reach.

## 2020-10-22 NOTE — DISCHARGE INSTRUCTIONS
Dr. Sales' Discharge Instructions:  The first week after your joint replacement is a time to recover from the surgery. We expect light exercise to keep you active and mobile. Dr. Sales requires a walker or cane for most patients in the first few days following surgery to try to prevent falls or complications.     Take your pain medication as appropriate to ensure that your pain is not limiting your recovery. You'll be seen in clinic in 7-10 days (this appointment has already been made, call our office if you're unsure of the time). At that time, we'll prescribe your physical therapy to help with the recovery phase.     -Keep dressing clean and dry. Leave dressing in place until follow up. If you have an incisional vac dressing, the battery may die before your first post operative appointment, but you can leave the surgical dressing in place and it will be removed at your clinic visit. The battery of the dressing may die, and the sponge will inflate because it is no longer holding suction. You can still leave the dressing in place and it will be removed at the office. Call the office if you notice drainage from the surgical dressing.    -OK to shower, keep dressing in place. Pat dressing dry, do not rub incision    -Do not soak incision in bath, hot tub or pool    -Follow up with Dr. Sales at regularly scheduled time    -Call [facility] office at [phone] for questions    -Weight bearing status: as tolerated    -Take medication as prescribed for DVT prophylaxis    Discharge Instructions    Discharged to home by car with friend. Discharged via wheelchair, hospital escort: Yes.  Special equipment needed: Not Applicable    Be sure to schedule a follow-up appointment with your primary care doctor or any specialists as instructed.     Discharge Plan:        I understand that a diet low in cholesterol, fat, and sodium is recommended for good health. Unless I have been given specific instructions below for another diet, I accept  this instruction as my diet prescription.   Other diet: n/a    Special Instructions: Discharge instructions for the Orthopedic Patient    Follow up with Primary Care Physician within 2 weeks of discharge to home, regarding:  Review of medications and diagnostic testing.  Surveillance for medical complications.  Workup and treatment of osteoporosis, if appropriate.     -Is this a Hip/Knee/Shoulder Joint Replacement patient? Yes   TOTAL HIP REPLACEMENT, AFTER-CARE GUIDELINES     These instructions provide you with information on caring for yourself and your hip after surgery. Your health care provider may also give you instructions that are more specific. Your treatment was planned and performed according to current medical practices but problems sometimes occur. Call your health care provider if you have any problems or questions.     WHAT TO EXPECT AFTER THE PROCEDURE   After your procedure, your hip will typically be stiff, sore, and bruised. This will improve over time.     Pain   · Follow your home pain management plan as discussed with your nurse and as directed by your provider.   · It is important to follow any scheduled pain medications for maximal pain relief.   · If prescribed opioid medication, the goal is to use opioids only as needed and to wean off prescription pain medicine as soon as possible.   · Ice use for pain control.  · Put ice in a plastic bag.   · Place a towel between your skin and the bag.   · Leave the ice on for 20 minutes, 2-3 times a day at a minimum.   · Most patients are off the pain pills by 3 weeks. If your pain continues to be severe, follow up with your provider.     Infection   Deep hip joint infections that require removal of the prostheses occur in less than 1.0% of patients. Lesser infections in the skin (cellulites) are more common and much more easily treated.   · Keep the incision as clean and dry as possible.   · Always wash your hands before touching your incision.    · Avoid dental care for 3 months after surgery. Your provider may recommend taking a dose of antibiotics an hour prior to any dental procedure. After 2 years, most providers recommend antibiotics only before an extensive procedure. Ask your provider what they recommend.   · Signs and symptoms of infection include low-grade fever, redness, pain, swelling and drainage from your incision. Notify your provider IMMEDIATELY if you develop ANY of these symptoms.     Post op Disturbances   · Bowel Habits - constipation is extremely common and caused by a combination of anesthesia, lack of mobility, dehydration and pain medicine. Use stool softeners or laxatives if necessary. It is important not to ignore this problem as bowel obstructions can be a serious complication after joint replacement surgery.   · Mood/Energy Level - Many patients experience a lack of energy and endurance for up to 2-3 months after surgery. Some people feel down and can even become depressed. This is likely due to postoperative anemia, change in activity level, lack of sleep, pain medicine and just the emotional reaction to the surgery itself that is a big disruption in a person’s life. This usually passes. If symptoms persist, follow up with your primary care provider.   · Returning to Work - Your provider will give you specific instructions based on your profession. Generally, if you work a sedentary job requiring little standing or walking, most patients may return within 2-6 weeks. Manual labor jobs involving walking, lifting and standing may take 3-4 months. Your provider’s office can provide a release to part-time or light duty work early on in your recovery and progress you to full duty as able.   · Driving - You can begin driving once cleared by your provider, provided you are no longer taking narcotic pain medication or any other medications that impair driving. Discuss the length of time with your doctor as returning to driving will  depend on things such as your vehicle, which hip was replaced (right or left) and if you do or do not have post-operative movement precautions.   · Avoiding falls - A fall during the first few weeks after surgery can damage your new hip and may result in a need for further surgery.  throw rugs and tack down loose carpeting. Be aware of floor hazards such as pets, small objects or uneven surfaces. Notify your provider of any falls.   · [place] Metal Detectors - The sensitivity of metal detectors varies and it is likely that your prosthesis will cause an alarm. Inform the  of your artificial joint.     Diet   · Resume your normal diet as tolerated.   · It is important to achieve a healthy nutritional status by eating a well-balanced diet on a regular basis.   · Your provider may recommend that you take iron and vitamin supplements.   · Continue to drink plenty of fluids.     Shower/Bathing   · You may shower as soon as you get home from the hospital unless otherwise instructed.   · Keep your incision out of water to prevent infection. To keep the incision dry when showering, cover it with a plastic bag or plastic wrap. If your bandage is waterproof, this may not be necessary.   · Pat incision dry if it gets wet. Do not rub. Notify your provider.   · Do not submerge in a bath until cleared by your provider. Your staples must be out and the incision completely healed.     Dressing Changes: Only change your dressing if directed by your provider.   · Wash hands.   · Open all dressing change materials.   · Remove old dressing and discard.   · Inspect incision for signs of irritation or infection including redness, increase in clear drainage, yellow/green drainage, odor and surrounding skin hot to touch. Notify your provider if present.   ·  the new dressing by one corner and lay over the incision. Be careful not to touch the inside of the dressing that will lay over the incision.   · Secure in  place as instructed.     Swelling/Bruising   · Swelling is normal after hip replacement and can involve the thigh, knee, calf and foot.   · Swelling can last from 3-6 months.   · To reduce swelling, elevate your leg higher than your heart while reclining. The first week you are home you should elevate your leg an equal amount of time as you are active.   · The swelling is usually worse after you go home since you are upright for longer periods of time.   · Bruising often does not appear until after you arrive home and can be quite dramatic- appearing purple, black, or green. Bruising is typically not concerning and will subside without any treatment.     Blood Clot Prevention   Your treatment plan includes multiple preventative measures to decrease the risk of blood clots in the legs (DVTs) and the less common, but serious, clots that travel to the lungs (pulmonary emboli). Most patients are at standard risk for them, but people who are at higher risk include those who have had previous clots, a family history of clotting, smoking, diabetes, obesity, advanced age, use estrogen and/or live a sedentary lifestyle.     · Signs of blood clots in legs include - Swelling in thigh, calf or ankle that does not go down with elevation. Pain, heat and tenderness in calf, back of calf or groin area. NOTE: blood clots can occur in either leg.   · Signs of blood clots in lungs include - Sudden increased shortness of breath, sudden onset of chest pain, and localized chest pain with coughing.   · If you experience any of the above symptoms, notify your provider and seek medical attention immediately.   · You received anticoagulant therapy (blood thinners) in the hospital. Continue the prescribed blood-thinning medication at home, as directed by your provider.   · Your risk for developing a clot continues for up to 2-3 months after surgery. Avoid prolonged sitting and dehydration (long air trips and car trips). If you do take a trip  during this time, please get up, move around every 1-1.5 hours, and discuss all travel plans with your provider.     Activity   Once you get home, stay active. The key is not to overdo it. While you can expect some good days and some bad days, you should notice a gradual improvement over time and a gradual increase in endurance over the next 6 to 12 months.     · Weight Bearing - You can begin to bear weight as tolerated unless otherwise directed by your provider. Use a walker, crutches or cane to assist with walking until you can walk smoothly (minimal or no limp) without assistance.   · Physical Precautions - To assure proper recovery and tissue healing, you may be asked to take special precautions when moving (sitting, bending or sleeping) - usually for the first 6 weeks after surgery. Precautions vary from patient to patient depending on surgical approach used by your provider. Follow any specific precautions provided by your provider and physical therapist until cleared by your provider.   · Sitting - Early on it is easier to get up from a tall chair or a chair with arms. The physical therapist will show you how to sit and stand from a chair keeping your affected leg out in front of you. Get up and move around on a regular basis--at least once every hour.   · Walking - Walk as much as you like once your doctor gives permission to proceed, but remember that walking is no substitute for your prescribed exercises.  · Follow the home exercise program prescribed during your hospital stay as directed by your physical therapist or provider.   · Continued physical therapy may be prescribed after hospital discharge to strengthen your muscles and improve your gait/walking pattern. The decision to have therapy or not after the hospital stay is made by you and your provider prior to surgery or at your follow up appointment.   · Swimming is an excellent low impact activity; you can begin as soon as the wound is healed and  your provider clears you. Using a pair of training fins may make swimming a more enjoyable and effective exercise.   · Sexual activity - Your provider can tell you when it is safe to resume sexual activity.   · Other activities - Low impact activities are preferred. If you have specific questions, consult your provider.     When to Call the Doctor   Call the provider if you experience:   · Fever over 100.5° F   · Increased pain, drainage, redness, odor or heat around the incision area   · Shaking chills   · Increased knee pain with activity and rest   · Increased pain in calf, tenderness or redness above or below the knee   · Increased swelling of calf, ankle, foot   · Sudden increased shortness of breath, sudden onset of chest pain, localized chest pain with coughing   · Incision opening   Or, if there are any questions or concerns about medications or care.     Infection statistic resource:   https://[email or web address].manetch/contents/prosthetic-joint-infection-epidemiology-microbiology-clinical-manifestations-and-diagnosis     -Is this patient being discharged with medication to prevent blood clots?  Yes, Aspirin Aspirin, ASA oral tablets  What is this medicine?  ASPIRIN (AS pir in) is a pain reliever. It is used to treat mild pain and fever. This medicine is also used as directed by a doctor to prevent and to treat heart attacks, to prevent strokes and blood clots, and to treat arthritis or inflammation.  This medicine may be used for other purposes; ask your health care provider or pharmacist if you have questions.  COMMON BRAND NAME(S): Aspir-Low, Aspir-Zaria, Aspirtab, Blanca Advanced Aspirin, Blanca Aspirin, Blanca Aspirin Extra Strength, Blanca Aspirin Plus, Blanca Extra Strength, Blanca Extra Strength Plus, Blanca Genuine Aspirin, Blanca Womens Aspirin, Bufferin, Bufferin Extra Strength, Bufferin Low Dose  What should I tell my health care provider before I take this medicine?  They need to know if you have any of these  conditions:  · anemia  · asthma  · bleeding problems  · child with chickenpox, the flu, or other viral infection  · diabetes  · gout  · if you frequently drink alcohol containing drinks  · kidney disease  · liver disease  · low level of vitamin K  · lupus  · smoke tobacco  · stomach ulcers or other problems  · an unusual or allergic reaction to aspirin, tartrazine dye, other medicines, dyes, or preservatives  · pregnant or trying to get pregnant  · breast-feeding  How should I use this medicine?  Take this medicine by mouth with a glass of water. Follow the directions on the package or prescription label. You can take this medicine with or without food. If it upsets your stomach, take it with food. Do not take your medicine more often than directed.  Talk to your pediatrician regarding the use of this medicine in children. While this drug may be prescribed for children as young as 12 years of age for selected conditions, precautions do apply. Children and teenagers should not use this medicine to treat chicken pox or flu symptoms unless directed by a doctor.  Patients over 65 years old may have a stronger reaction and need a smaller dose.  Overdosage: If you think you have taken too much of this medicine contact a poison control center or emergency room at once.  NOTE: This medicine is only for you. Do not share this medicine with others.  What if I miss a dose?  If you are taking this medicine on a regular schedule and miss a dose, take it as soon as you can. If it is almost time for your next dose, take only that dose. Do not take double or extra doses.  What may interact with this medicine?  Do not take this medicine with any of the following medications:  · cidofovir  · ketorolac  · probenecid  This medicine may also interact with the following medications:  · alcohol  · alendronate  · bismuth subsalicylate  · flavocoxid  · herbal supplements like feverfew, garlic, jet, ginkgo biloba, horse  chestnut  · medicines for diabetes or glaucoma like acetazolamide, methazolamide  · medicines for gout  · medicines that treat or prevent blood clots like enoxaparin, heparin, ticlopidine, warfarin  · other aspirin and aspirin-like medicines  · NSAIDs, medicines for pain and inflammation, like ibuprofen or naproxen  · pemetrexed  · sulfinpyrazone  · varicella live vaccine  This list may not describe all possible interactions. Give your health care provider a list of all the medicines, herbs, non-prescription drugs, or dietary supplements you use. Also tell them if you smoke, drink alcohol, or use illegal drugs. Some items may interact with your medicine.  What should I watch for while using this medicine?  If you are treating yourself for pain, tell your doctor or health care professional if the pain lasts more than 10 days, if it gets worse, or if there is a new or different kind of pain. Tell your doctor if you see redness or swelling. Also, check with your doctor if you have a fever that lasts for more than 3 days. Only take this medicine to prevent heart attacks or blood clotting if prescribed by your doctor or health care professional.  Do not take aspirin or aspirin-like medicines with this medicine. Too much aspirin can be dangerous. Always read the labels carefully.  This medicine can irritate your stomach or cause bleeding problems. Do not smoke cigarettes or drink alcohol while taking this medicine. Do not lie down for 30 minutes after taking this medicine to prevent irritation to your throat.  If you are scheduled for any medical or dental procedure, tell your healthcare provider that you are taking this medicine. You may need to stop taking this medicine before the procedure.  This medicine may be used to treat migraines. If you take migraine medicines for 10 or more days a month, your migraines may get worse. Keep a diary of headache days and medicine use. Contact your healthcare professional if your  migraine attacks occur more frequently.  What side effects may I notice from receiving this medicine?  Side effects that you should report to your doctor or health care professional as soon as possible:  · allergic reactions like skin rash, itching or hives, swelling of the face, lips, or tongue  · breathing problems  · changes in hearing, ringing in the ears  · confusion  · general ill feeling or flu-like symptoms  · pain on swallowing  · redness, blistering, peeling or loosening of the skin, including inside the mouth or nose  · signs and symptoms of bleeding such as bloody or black, tarry stools; red or dark-brown urine; spitting up blood or brown material that looks like coffee grounds; red spots on the skin; unusual bruising or bleeding from the eye, gums, or nose  · trouble passing urine or change in the amount of urine  · unusually weak or tired  · yellowing of the eyes or skin  Side effects that usually do not require medical attention (report to your doctor or health care professional if they continue or are bothersome):  · diarrhea or constipation  · headache  · nausea, vomiting  · stomach gas, heartburn  This list may not describe all possible side effects. Call your doctor for medical advice about side effects. You may report side effects to FDA at 2-954-FDA-4020.  Where should I keep my medicine?  Keep out of the reach of children.  Store at room temperature between 15 and 30 degrees C (59 and 86 degrees F). Protect from heat and moisture. Do not use this medicine if it has a strong vinegar smell. Throw away any unused medicine after the expiration date.  NOTE: This sheet is a summary. It may not cover all possible information. If you have questions about this medicine, talk to your doctor, pharmacist, or health care provider.  © 2020 [employer]Gold Standard (2018-01-30 10:42:13)      · Is patient discharged on Warfarin / Coumadin?   No     Depression / Suicide Risk    As you are discharged from this  [facility] facility, it is important to learn how to keep safe from harming yourself.    Recognize the warning signs:  · Abrupt changes in personality, positive or negative- including increase in energy   · Giving away possessions  · Change in eating patterns- significant weight changes-  positive or negative  · Change in sleeping patterns- unable to sleep or sleeping all the time   · Unwillingness or inability to communicate  · Depression  · Unusual sadness, discouragement and loneliness  · Talk of wanting to die  · Neglect of personal appearance   · Rebelliousness- reckless behavior  · Withdrawal from people/activities they love  · Confusion- inability to concentrate     If you or a loved one observes any of these behaviors or has concerns about self-harm, here's what you can do:  · Talk about it- your feelings and reasons for harming yourself  · Remove any means that you might use to hurt yourself (examples: pills, rope, extension cords, firearm)  · Get professional help from the community (Mental Health, Substance Abuse, psychological counseling)  · Do not be alone:Call your Safe Contact- someone whom you trust who will be there for you.  · Call your local CRISIS HOTLINE 989-5690 or 528-701-0556  · Call your local Children's Mobile Crisis Response Team [place] (856) 714-0649 or www.Sp[employer]Dati  · Call the toll free National Suicide Prevention Hotlines   · National Suicide Prevention Lifeline 692-531-TFMF (7650)  · National Hope Line Network 800-SUICIDE (735-5590)

## 2020-10-22 NOTE — CARE PLAN
Problem: Discharge Barriers/Planning  Goal: Patient's continuum of care needs will be met  Outcome: PROGRESSING AS EXPECTED  Note: Plan to discharge home today     Problem: Pain Management  Goal: Pain level will decrease to patient's comfort goal  Outcome: PROGRESSING AS EXPECTED  Note: Oxycodone 10mg given around the clock for c/o surgical pain/R-hip   Will continue to medicate to keep pain under control

## 2021-04-20 ENCOUNTER — IMMUNIZATION (OUTPATIENT)
Dept: FAMILY PLANNING/WOMEN'S HEALTH CLINIC | Facility: IMMUNIZATION CENTER | Age: 61
End: 2021-04-20
Payer: COMMERCIAL

## 2021-04-20 DIAGNOSIS — Z23 ENCOUNTER FOR VACCINATION: Primary | ICD-10-CM

## 2021-04-20 PROCEDURE — 91300 PFIZER SARS-COV-2 VACCINE: CPT

## 2021-04-20 PROCEDURE — 0001A PFIZER SARS-COV-2 VACCINE: CPT

## 2021-05-13 ENCOUNTER — IMMUNIZATION (OUTPATIENT)
Dept: FAMILY PLANNING/WOMEN'S HEALTH CLINIC | Facility: IMMUNIZATION CENTER | Age: 61
End: 2021-05-13
Payer: COMMERCIAL

## 2021-05-13 DIAGNOSIS — Z23 ENCOUNTER FOR VACCINATION: Primary | ICD-10-CM

## 2021-05-13 PROCEDURE — 0002A PFIZER SARS-COV-2 VACCINE: CPT

## 2021-05-13 PROCEDURE — 91300 PFIZER SARS-COV-2 VACCINE: CPT

## 2021-06-28 ENCOUNTER — HOSPITAL ENCOUNTER (EMERGENCY)
Facility: MEDICAL CENTER | Age: 61
End: 2021-06-28
Attending: EMERGENCY MEDICINE
Payer: COMMERCIAL

## 2021-06-28 ENCOUNTER — APPOINTMENT (OUTPATIENT)
Dept: RADIOLOGY | Facility: MEDICAL CENTER | Age: 61
End: 2021-06-28
Attending: EMERGENCY MEDICINE
Payer: COMMERCIAL

## 2021-06-28 VITALS
RESPIRATION RATE: 16 BRPM | WEIGHT: 249.34 LBS | OXYGEN SATURATION: 95 % | BODY MASS INDEX: 32 KG/M2 | DIASTOLIC BLOOD PRESSURE: 81 MMHG | TEMPERATURE: 98.3 F | SYSTOLIC BLOOD PRESSURE: 153 MMHG | HEART RATE: 82 BPM | HEIGHT: 74 IN

## 2021-06-28 DIAGNOSIS — R07.9 ACUTE CHEST PAIN: ICD-10-CM

## 2021-06-28 DIAGNOSIS — I48.0 PAROXYSMAL ATRIAL FIBRILLATION (HCC): ICD-10-CM

## 2021-06-28 DIAGNOSIS — I48.91 NEW ONSET ATRIAL FIBRILLATION (HCC): ICD-10-CM

## 2021-06-28 LAB
ALBUMIN SERPL BCP-MCNC: 4.2 G/DL (ref 3.2–4.9)
ALBUMIN/GLOB SERPL: 1.8 G/DL
ALP SERPL-CCNC: 77 U/L (ref 30–99)
ALT SERPL-CCNC: 24 U/L (ref 2–50)
ANION GAP SERPL CALC-SCNC: 10 MMOL/L (ref 7–16)
APTT PPP: 27.5 SEC (ref 24.7–36)
AST SERPL-CCNC: 27 U/L (ref 12–45)
BASOPHILS # BLD AUTO: 0.9 % (ref 0–1.8)
BASOPHILS # BLD: 0.07 K/UL (ref 0–0.12)
BILIRUB SERPL-MCNC: 0.5 MG/DL (ref 0.1–1.5)
BUN SERPL-MCNC: 26 MG/DL (ref 8–22)
CALCIUM SERPL-MCNC: 9 MG/DL (ref 8.4–10.2)
CHLORIDE SERPL-SCNC: 108 MMOL/L (ref 96–112)
CO2 SERPL-SCNC: 21 MMOL/L (ref 20–33)
CREAT SERPL-MCNC: 1.09 MG/DL (ref 0.5–1.4)
EKG IMPRESSION: NORMAL
EKG IMPRESSION: NORMAL
EOSINOPHIL # BLD AUTO: 0.05 K/UL (ref 0–0.51)
EOSINOPHIL NFR BLD: 0.7 % (ref 0–6.9)
ERYTHROCYTE [DISTWIDTH] IN BLOOD BY AUTOMATED COUNT: 47 FL (ref 35.9–50)
GLOBULIN SER CALC-MCNC: 2.4 G/DL (ref 1.9–3.5)
GLUCOSE SERPL-MCNC: 96 MG/DL (ref 65–99)
HCT VFR BLD AUTO: 52 % (ref 42–52)
HGB BLD-MCNC: 17.1 G/DL (ref 14–18)
IMM GRANULOCYTES # BLD AUTO: 0.05 K/UL (ref 0–0.11)
IMM GRANULOCYTES NFR BLD AUTO: 0.7 % (ref 0–0.9)
INR PPP: 1.02 (ref 0.87–1.13)
LYMPHOCYTES # BLD AUTO: 1.7 K/UL (ref 1–4.8)
LYMPHOCYTES NFR BLD: 22.8 % (ref 22–41)
MCH RBC QN AUTO: 28.1 PG (ref 27–33)
MCHC RBC AUTO-ENTMCNC: 32.9 G/DL (ref 33.7–35.3)
MCV RBC AUTO: 85.4 FL (ref 81.4–97.8)
MONOCYTES # BLD AUTO: 0.85 K/UL (ref 0–0.85)
MONOCYTES NFR BLD AUTO: 11.4 % (ref 0–13.4)
NEUTROPHILS # BLD AUTO: 4.73 K/UL (ref 1.82–7.42)
NEUTROPHILS NFR BLD: 63.5 % (ref 44–72)
NRBC # BLD AUTO: 0 K/UL
NRBC BLD-RTO: 0 /100 WBC
PLATELET # BLD AUTO: 219 K/UL (ref 164–446)
PMV BLD AUTO: 9.8 FL (ref 9–12.9)
POTASSIUM SERPL-SCNC: 4.2 MMOL/L (ref 3.6–5.5)
PROT SERPL-MCNC: 6.6 G/DL (ref 6–8.2)
PROTHROMBIN TIME: 13.1 SEC (ref 12–14.6)
RBC # BLD AUTO: 6.09 M/UL (ref 4.7–6.1)
SODIUM SERPL-SCNC: 139 MMOL/L (ref 135–145)
TROPONIN T SERPL-MCNC: 13 NG/L (ref 6–19)
TSH SERPL DL<=0.005 MIU/L-ACNC: 2.43 UIU/ML (ref 0.38–5.33)
WBC # BLD AUTO: 7.5 K/UL (ref 4.8–10.8)

## 2021-06-28 PROCEDURE — 36415 COLL VENOUS BLD VENIPUNCTURE: CPT

## 2021-06-28 PROCEDURE — 99284 EMERGENCY DEPT VISIT MOD MDM: CPT

## 2021-06-28 PROCEDURE — 71045 X-RAY EXAM CHEST 1 VIEW: CPT

## 2021-06-28 PROCEDURE — 85730 THROMBOPLASTIN TIME PARTIAL: CPT

## 2021-06-28 PROCEDURE — 85610 PROTHROMBIN TIME: CPT

## 2021-06-28 PROCEDURE — 85025 COMPLETE CBC W/AUTO DIFF WBC: CPT

## 2021-06-28 PROCEDURE — 93005 ELECTROCARDIOGRAM TRACING: CPT

## 2021-06-28 PROCEDURE — 93005 ELECTROCARDIOGRAM TRACING: CPT | Performed by: EMERGENCY MEDICINE

## 2021-06-28 PROCEDURE — 84443 ASSAY THYROID STIM HORMONE: CPT

## 2021-06-28 PROCEDURE — 80053 COMPREHEN METABOLIC PANEL: CPT

## 2021-06-28 PROCEDURE — 84484 ASSAY OF TROPONIN QUANT: CPT

## 2021-06-28 PROCEDURE — 700105 HCHG RX REV CODE 258: Performed by: EMERGENCY MEDICINE

## 2021-06-28 RX ORDER — DILTIAZEM HYDROCHLORIDE 5 MG/ML
20 INJECTION INTRAVENOUS ONCE
Status: DISCONTINUED | OUTPATIENT
Start: 2021-06-28 | End: 2021-06-28 | Stop reason: HOSPADM

## 2021-06-28 RX ORDER — SODIUM CHLORIDE 9 MG/ML
1000 INJECTION, SOLUTION INTRAVENOUS ONCE
Status: COMPLETED | OUTPATIENT
Start: 2021-06-28 | End: 2021-06-28

## 2021-06-28 RX ADMIN — SODIUM CHLORIDE 1000 ML: 9 INJECTION, SOLUTION INTRAVENOUS at 13:14

## 2021-06-28 ASSESSMENT — FIBROSIS 4 INDEX: FIB4 SCORE: 1.33

## 2021-06-28 NOTE — ED NOTES
Patient given discharge instructions question and concerns addressed.  Patient ambulated out without difficulty

## 2021-06-28 NOTE — ED NOTES
Pt is now in sinus rhythm spontaneously. Diltiazem held and ERP notified. Report given to Anupama MAYES.

## 2021-06-28 NOTE — ED NOTES
Pt placed on monitor. ERP has been to bedside to see pt. IV started, labs drawn and taken to lab. Pt is clammy and reports not feeling well. States some chest discomfort and having swelling in legs and ankles.

## 2021-06-28 NOTE — ED PROVIDER NOTES
ED Provider Note    CHIEF COMPLAINT  Chief Complaint   Patient presents with   • Irregular Heart Beat     onset 0400 Triage EKG reveals New onset A.Fib RVR  HR max 159   • Chest Pain     heaviness Some SOB and dizziness       HPI  Samir Godinez is a 61 y.o. male who presents for evaluation of some heaviness in his chest, shortness of breath and lightheadedness with palpitations.  He has never experienced this in the past.  He states that any sort of exertion seems to make him feel short of breath.  He has no ongoing medical problems.  No history of atrial fibrillation.  No known coronary disease, he denies being treated for hypertension or hypercholesterolemia.  No abdominal pain, no vomiting.  He felt well upon going to bed last night and believes this began sometime in the early morning hours.  He does report that over the past couple months he has noticed some puffiness in his lower extremities which she states is also new    REVIEW OF SYSTEMS  Negative for fever, rash, abdominal pain, nausea, vomiting, diarrhea, headache, focal weakness, focal numbness, focal tingling, back pain. All other systems are negative.     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    SOCIAL HISTORY  Social History     Tobacco Use   • Smoking status: Never Smoker   • Smokeless tobacco: Never Used   Vaping Use   • Vaping Use: Never used   Substance Use Topics   • Alcohol use: Yes     Comment: couple times a year   • Drug use: No       SURGICAL HISTORY  Past Surgical History:   Procedure Laterality Date   • HIP REVISION TOTAL Right 10/19/2020    Procedure: REVISION, TOTAL ARTHROPLASTY, HIP;  Surgeon: Emigdio Sales M.D.;  Location: SURGERY [facility];  Service:  "Orthopedics   • CARPAL TUNNEL RELEASE Left 07/2020   • PB RECONSTR TOTAL SHOULDER IMPLANT Right 11/27/2019    Procedure: ARTHROPLASTY, SHOULDER, TOTAL;  Surgeon: Denzel Veliz M.D.;  Location: SURGERY [facility];  Service: Orthopedics   • PB REVISE MEDIAN N/CARPAL TUNNEL SURG Right 11/27/2019    Procedure: RELEASE, CARPAL TUNNEL;  Surgeon: Denzel Veliz M.D.;  Location: SURGERY [facility];  Service: Orthopedics   • PB RECONSTR TOTAL SHOULDER IMPLANT Left 9/25/2019    Procedure: ARTHROPLASTY, SHOULDER, TOTAL- ANATOMIC;  Surgeon: Denzel Veliz M.D.;  Location: SURGERY [facility];  Service: Orthopedics   • HIP REPLACEMENT, REVISION Right 2014   • HIP REPLACEMENT, TOTAL Right 2008   • HIP ARTHROSCOPY Right 2007   • KNEE ARTHROSCOPY Left 2000       CURRENT MEDICATIONS  I personally reviewed the medication list in the charting documentation.     ALLERGIES  No Known Allergies    MEDICAL RECORD  I have reviewed patient's medical record and pertinent results are listed above.      PHYSICAL EXAM  VITAL SIGNS: /87   Pulse (!) 129   Temp 36.8 °C (98.3 °F) (Temporal)   Resp 14   Ht 1.88 m (6' 2\")   Wt 113 kg (249 lb 5.4 oz)   SpO2 97%   BMI 32.01 kg/m²    Constitutional: Mildly anxious appearing otherwise well appearing patient in no acute distress.  Awake and alert, not toxic nor ill in appearance.  HENT: Normocephalic, no obvious evidence of acute trauma.  Eyes: No scleral icterus. Normal conjunctiva   Neck: Comfortable movement without any obvious restriction in the range of motion.  Cardiovascular: Upon ascultation I appreciate a irregular rhythm and a tachycardic rate.   Thorax & Lungs: Normal nonlabored respirations.  Upon application of the stethoscope for auscultation I find there to be no associated chest wall tenderness.  I appreciate no wheezing, rhonchi or rales. There is normal air movement.    Abdomen: The abdomen is not visibly distended. Upon palpation, I find " it to be without tenderness.  No mass appreciated.  Skin: The exposed portions of skin reveal no obvious rash or other abnormalities.  Extremities/Musculoskeletal: No obvious sign of acute trauma. No asymmetric calf tenderness or edema.   Neurologic: Alert & oriented. No focal deficits observed.   Psychiatric: Normal affect appropriate for the clinical situation.    DIAGNOSTIC STUDIES / PROCEDURES    LABS/EKGs  Results for orders placed or performed during the hospital encounter of 06/28/21   CBC WITH DIFFERENTIAL   Result Value Ref Range    WBC 7.5 4.8 - 10.8 K/uL    RBC 6.09 4.70 - 6.10 M/uL    Hemoglobin 17.1 14.0 - 18.0 g/dL    Hematocrit 52.0 42.0 - 52.0 %    MCV 85.4 81.4 - 97.8 fL    MCH 28.1 27.0 - 33.0 pg    MCHC 32.9 (L) 33.7 - 35.3 g/dL    RDW 47.0 35.9 - 50.0 fL    Platelet Count 219 164 - 446 K/uL    MPV 9.8 9.0 - 12.9 fL    Neutrophils-Polys 63.50 44.00 - 72.00 %    Lymphocytes 22.80 22.00 - 41.00 %    Monocytes 11.40 0.00 - 13.40 %    Eosinophils 0.70 0.00 - 6.90 %    Basophils 0.90 0.00 - 1.80 %    Immature Granulocytes 0.70 0.00 - 0.90 %    Nucleated RBC 0.00 /100 WBC    Neutrophils (Absolute) 4.73 1.82 - 7.42 K/uL    Lymphs (Absolute) 1.70 1.00 - 4.80 K/uL    Monos (Absolute) 0.85 0.00 - 0.85 K/uL    Eos (Absolute) 0.05 0.00 - 0.51 K/uL    Baso (Absolute) 0.07 0.00 - 0.12 K/uL    Immature Granulocytes (abs) 0.05 0.00 - 0.11 K/uL    NRBC (Absolute) 0.00 K/uL   COMP METABOLIC PANEL   Result Value Ref Range    Sodium 139 135 - 145 mmol/L    Potassium 4.2 3.6 - 5.5 mmol/L    Chloride 108 96 - 112 mmol/L    Co2 21 20 - 33 mmol/L    Anion Gap 10.0 7.0 - 16.0    Glucose 96 65 - 99 mg/dL    Bun 26 (H) 8 - 22 mg/dL    Creatinine 1.09 0.50 - 1.40 mg/dL    Calcium 9.0 8.4 - 10.2 mg/dL    AST(SGOT) 27 12 - 45 U/L    ALT(SGPT) 24 2 - 50 U/L    Alkaline Phosphatase 77 30 - 99 U/L    Total Bilirubin 0.5 0.1 - 1.5 mg/dL    Albumin 4.2 3.2 - 4.9 g/dL    Total Protein 6.6 6.0 - 8.2 g/dL    Globulin 2.4 1.9 - 3.5  g/dL    A-G Ratio 1.8 g/dL   TROPONIN   Result Value Ref Range    Troponin T 13 6 - 19 ng/L   Prothrombin Time   Result Value Ref Range    PT 13.1 12.0 - 14.6 sec    INR 1.02 0.87 - 1.13   APTT   Result Value Ref Range    APTT 27.5 24.7 - 36.0 sec   TSH WITH REFLEX TO FT4   Result Value Ref Range    TSH 2.430 0.380 - 5.330 uIU/mL   ESTIMATED GFR   Result Value Ref Range    GFR If African American >60 >60 mL/min/1.73 m 2    GFR If Non African American >60 >60 mL/min/1.73 m 2   EKG   Result Value Ref Range    Report       [facility] Emergency Dept.    Test Date:  2021  Pt Name:    AROLDO WRIGHT            Department: EDS  MRN:        [id number]                      Room:       [place] [address]  Gender:     Male                         Technician: MARCIA  :        1960                   Requested By:ER TRIAGE PROTOCOL  Order #:    [id number]                    Reading MD: SCOTTIE GTZ MD    Measurements  Intervals                                Axis  Rate:       129                          P:  IL:                                      QRS:        4  QRSD:       83                           T:          16  QT:         298  QTc:        437    Interpretive Statements  12 Lead EKG interpreted by me to show: -- Rate 129 -- Rhythm: Atrial  fibrillation -- Axis: Normal -- QRS Intervals: Normal -- T waves: No acute  changes -- ST segments: No acute changes -- Ectopy: None. My impression of  this  EKG: Atrial fibrillation, does not indicate acute ischemia at this time.  Atrial   fibrillation with rapid ventricular response  Electronically Signed On 2021 12:53:58 PDT by SOCTTIE GTZ MD     EKG   Result Value Ref Range    Report       [facility] Emergency Dept.    Test Date:  2021  Pt Name:    AROLDO WRIGHT            Department: EDS  MRN:        [id number]                      [facility]  Gender:     Male                         Technician:  [id number]  :        1960                   Requested By:SCOTTIE GTZ  Order #:    [id number]                    Reading MD: SCOTTIE GTZ MD    Measurements  Intervals                                Axis  Rate:       64                           P:          42  WA:         160                          QRS:        -16  QRSD:       92                           T:          16  QT:         384  QTc:        396    Interpretive Statements  SINUS RHYTHM  PROBABLE LEFT ATRIAL ABNORMALITY  BORDERLINE LEFT AXIS DEVIATION  BASELINE WANDER IN LEAD(S) V2,V3  Compared to ECG 2021 12:22:57  Atrial fibrillation no longer present  Possible ischemia no longer present  Electronically Signed On 2021 15:30:32 PDT by SCOTTIE GTZ MD          RADIOLOGY  DX-CHEST-LIMITED (1 VIEW)   Final Result      Improving basilar atelectasis with mild residual right infrahilar opacity that most likely is from atelectasis or scarring. Aspiration or other consolidation is not excluded            COURSE & MEDICAL DECISION MAKING  I have reviewed any medical record information, laboratory studies and radiographic results as noted above.  Differential diagnoses includes: New onset atrial fibrillation, thyroid dysregulation, anemia, electrolyte abnormalities, pneumonia, pleural effusion/pulmonary edema    Encounter Summary: This is a very pleasant 61 y.o. male who unfortunately required evaluation in the emergency department today with some heaviness of his chest, shortness of breath and palpitations with lightheadedness, found to be in A. fib which is new with a rapid ventricular rate.  Is also quite anxious on exam.  He states he has no medical problems otherwise, has no other focal findings on exam, his EKG does not reveal any ischemic changes.  Will obtain blood work including thyroid studies, and a troponin.  The plan will be to rate control him and ultimately get him anticoagulated for cardiology follow-up.  The patient  suspects this is new as of today although does have a report of some episodes of palpitations in the recent past, I do not feel as though it would be appropriate to cardiovert him here in the emergency department without appropriate duration of anticoagulation ------- the work-up here is unremarkable troponin is negative which is of value in excluding cardiac ischemia given the constant duration of symptoms he has been experiencing, greater than 6 hours.  Even prior to administration of any medication he converted to a normal sinus rhythm and his symptoms improved albeit not fully resolved.  I discussed the case with the on-call cardiologist Dr. Hodgson, specifically to discuss the need for anticoagulation and he has advised against it.  He will follow up with cardiology.  Return instructions provided.  Of note his x-ray is negative for obvious acute pertinent findings.  There is improving basilar atelectasis with some residual right infrahilar opacity which clinically does not represent a consolidation or aspiration.      DISPOSITION: Discharged home in stable condition      FINAL IMPRESSION  1. Paroxysmal atrial fibrillation (HCC)    2. New onset atrial fibrillation (HCC)    3. Acute chest pain           This dictation was created using voice recognition software. The accuracy of the dictation is limited to the abilities of the software. I expect there may be some errors of grammar and possibly content. The nursing notes were reviewed and certain aspects of this information were incorporated into this note.    Electronically signed by: Vic Sheikh M.D., 6/28/2021 12:48 PM

## 2021-07-04 ENCOUNTER — HOSPITAL ENCOUNTER (EMERGENCY)
Facility: MEDICAL CENTER | Age: 61
End: 2021-07-04
Payer: COMMERCIAL

## 2021-07-04 VITALS
RESPIRATION RATE: 18 BRPM | HEIGHT: 74 IN | WEIGHT: 246.91 LBS | OXYGEN SATURATION: 99 % | HEART RATE: 68 BPM | TEMPERATURE: 97.3 F | BODY MASS INDEX: 31.69 KG/M2 | DIASTOLIC BLOOD PRESSURE: 68 MMHG | SYSTOLIC BLOOD PRESSURE: 143 MMHG

## 2021-07-04 LAB — EKG IMPRESSION: NORMAL

## 2021-07-04 PROCEDURE — 93005 ELECTROCARDIOGRAM TRACING: CPT

## 2021-07-04 PROCEDURE — 302449 STATCHG TRIAGE ONLY (STATISTIC)

## 2021-07-04 ASSESSMENT — PAIN DESCRIPTION - DESCRIPTORS: DESCRIPTORS: ACHING

## 2021-07-04 ASSESSMENT — FIBROSIS 4 INDEX: FIB4 SCORE: 1.54

## 2021-07-04 NOTE — ED TRIAGE NOTES
"Pt was seen in our department the 28 th of last month and evaluated/treated for \"heaviness in his chest, shortness of breath and lightheadedness with palpitations.\"  He returns today complaining of similar symptomatology. He also reports observing recurrence of bilateral pedal edema.   Chief Complaint   Patient presents with   • Irregular Heart Beat   • Tachycardia   • Peripheral Edema     /68   Pulse 68   Temp 36.3 °C (97.3 °F) (Temporal)   Resp 18   Ht 1.88 m (6' 2\")   Wt 112 kg (246 lb 14.6 oz)   SpO2 99%   BMI 31.70 kg/m²      "

## 2021-07-14 ENCOUNTER — OFFICE VISIT (OUTPATIENT)
Dept: CARDIOLOGY | Facility: MEDICAL CENTER | Age: 61
End: 2021-07-14
Attending: EMERGENCY MEDICINE
Payer: COMMERCIAL

## 2021-07-14 VITALS
HEART RATE: 56 BPM | HEIGHT: 74 IN | DIASTOLIC BLOOD PRESSURE: 80 MMHG | RESPIRATION RATE: 14 BRPM | WEIGHT: 252 LBS | BODY MASS INDEX: 32.34 KG/M2 | SYSTOLIC BLOOD PRESSURE: 140 MMHG | OXYGEN SATURATION: 96 %

## 2021-07-14 DIAGNOSIS — I48.0 PAF (PAROXYSMAL ATRIAL FIBRILLATION) (HCC): ICD-10-CM

## 2021-07-14 DIAGNOSIS — R07.89 OTHER CHEST PAIN: ICD-10-CM

## 2021-07-14 LAB — EKG IMPRESSION: NORMAL

## 2021-07-14 PROCEDURE — 93000 ELECTROCARDIOGRAM COMPLETE: CPT | Performed by: INTERNAL MEDICINE

## 2021-07-14 PROCEDURE — 99204 OFFICE O/P NEW MOD 45 MIN: CPT | Performed by: INTERNAL MEDICINE

## 2021-07-14 RX ORDER — FLECAINIDE ACETATE 50 MG/1
100 TABLET ORAL 2 TIMES DAILY
Qty: 60 TABLET | Refills: 3 | Status: SHIPPED
Start: 2021-07-14 | End: 2021-08-19

## 2021-07-14 ASSESSMENT — FIBROSIS 4 INDEX: FIB4 SCORE: 1.54

## 2021-07-14 NOTE — PROGRESS NOTES
Cardiology Initial Consultation Note    Date of note:    7/14/2021    Primary Care Provider: Rafael Forman M.D.  Referring Provider: Vic Sheikh M.D.     Patient Name: Samir Godinez   YOB: 1960  MRN:              [id number]    Chief Complaint: New diagnosis of paroxysmal atrial fibrillation    Samir Godinez is a 61 y.o. male  patient presented today with complaints of paroxysmal atrial fibrillation.  Documented on EKG during emergency room visit.  He has frequent spells, most of the day yesterday his heart rate was in 160s.  No prior cardiac problems he snores at night.  No coronary disease.  Complains of leg swelling left ankle, mild chest pains, shortness of breath.    ROS    All other systems reviewed and discussed using a comprehensive questionnaire and are negative.     Past medical history, family history, social history, allergies and labs are reviewed and updated as needed as documented below.    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          Past Surgical History:   Procedure Laterality Date   • HIP REVISION TOTAL Right 10/19/2020    Procedure: REVISION, TOTAL ARTHROPLASTY, HIP;  Surgeon: Emigdio Sales M.D.;  Location: SURGERY [facility];  Service: Orthopedics   • CARPAL TUNNEL RELEASE Left 07/2020   • PB RECONSTR TOTAL SHOULDER IMPLANT Right 11/27/2019    Procedure: ARTHROPLASTY, SHOULDER, TOTAL;  Surgeon: Denzel Veliz M.D.;  Location: SURGERY [facility];  Service: Orthopedics   • PB REVISE MEDIAN N/CARPAL TUNNEL SURG Right 11/27/2019    Procedure: RELEASE, CARPAL TUNNEL;  Surgeon: Denzel Veliz M.D.;  Location: SURGERY [facility];  Service: Orthopedics   • PB RECONSTR TOTAL SHOULDER IMPLANT Left 9/25/2019    Procedure: ARTHROPLASTY,  SHOULDER, TOTAL- ANATOMIC;  Surgeon: Denzel Veliz M.D.;  Location: SURGERY [facility];  Service: Orthopedics   • HIP REPLACEMENT, REVISION Right 2014   • HIP REPLACEMENT, TOTAL Right 2008   • HIP ARTHROSCOPY Right 2007   • KNEE ARTHROSCOPY Left 2000         Current Outpatient Medications   Medication Sig Dispense Refill   • flecainide (TAMBOCOR) 50 MG tablet Take 2 Tablets by mouth 2 times a day. 60 tablet 3   • metoprolol tartrate (LOPRESSOR) 25 MG Tab Take 0.5 Tablets by mouth 2 times a day. 60 tablet 11   • aspirin EC (ECOTRIN) 81 MG Tablet Delayed Response Take 1 Tab by mouth 2 Times a Day. (Patient taking differently: Take 81 mg by mouth every 48 hours. (once in a while)) 60 Tab 0   • ibuprofen (MOTRIN) 800 MG Tab Take 2,400 mg by mouth every 8 hours as needed. Takes 3 tabs       No current facility-administered medications for this visit.         No Known Allergies      History reviewed. No pertinent family history.      Social History     Socioeconomic History   • Marital status: Single     Spouse name: Not on file   • Number of children: Not on file   • Years of education: Not on file   • Highest education level: Not on file   Occupational History   • Not on file   Tobacco Use   • Smoking status: Never Smoker   • Smokeless tobacco: Never Used   Vaping Use   • Vaping Use: Never used   Substance and Sexual Activity   • Alcohol use: Yes     Comment: Rarely   • Drug use: No   • Sexual activity: Not on file   Other Topics Concern   • Not on file   Social History Narrative    ** Merged History Encounter **          Social Determinants of Health     Financial Resource Strain:    • Difficulty of Paying Living Expenses:    Food Insecurity:    • Worried About Running Out of Food in the Last Year:    • Ran Out of Food in the Last Year:    Transportation Needs:    • Lack of Transportation (Medical):    • Lack of Transportation (Non-Medical):    Physical Activity:    • Days of Exercise per Week:    •  "Minutes of Exercise per Session:    Stress:    • Feeling of Stress :    Social Connections:    • Frequency of Communication with Friends and Family:    • Frequency of Social Gatherings with Friends and Family:    • Attends [employer] Services:    • Active Member of Clubs or Organizations:    • Attends Club or Organization Meetings:    • Marital Status:    Intimate Partner Violence:    • Fear of Current or Ex-Partner:    • Emotionally Abused:    • Physically Abused:    • Sexually Abused:          Physical Exam:  Ambulatory Vitals  /80 (BP Location: Left arm, Patient Position: Sitting, BP Cuff Size: Adult)   Pulse (!) 56   Resp 14   Ht 1.88 m (6' 2\")   Wt 114 kg (252 lb)   SpO2 96%    Oxygen Therapy:  Pulse Oximetry: 96 %  BP Readings from Last 4 Encounters:   07/14/21 140/80   07/04/21 143/68   06/28/21 153/81   10/22/20 135/58       Weight/BMI: Body mass index is 32.35 kg/m².  Wt Readings from Last 4 Encounters:   07/14/21 114 kg (252 lb)   07/04/21 112 kg (246 lb 14.6 oz)   06/28/21 113 kg (249 lb 5.4 oz)   10/13/20 110 kg (242 lb 8.1 oz)         General: Well appearing and in no apparent distress  Head: atrumatic  Eyes: No conjunctival pallor   ENT: normal external appearance of nose and ears  Neck: JVD absent, carotid bruits absent  Lungs: respiratory sounds  normal, additional breath sounds absent  Heart: Regular rhythm,   No palpable thrills on palpation, murmurs absent, no rubs,   Lower extremity edema absent.   Pedal pulses normal  Abdomen: soft, non tender, non distended.  Extremities/MSK: no clubbing, no cyanosis  Neurological: normal orientation, Gait normal   Psychiatric: Appropriate affect, intact judgement and insight  Skin: Warm extremities        Lab Data Review:  Lab Results   Component Value Date/Time    CHOLSTRLTOT 177 01/03/2020 10:09 AM     (H) 01/03/2020 10:09 AM    HDL 49 01/03/2020 10:09 AM    TRIGLYCERIDE 72 01/03/2020 10:09 AM       Lab Results   Component Value Date/Time    " SODIUM 139 2021 12:55 PM    POTASSIUM 4.2 2021 12:55 PM    CHLORIDE 108 2021 12:55 PM    CO2 21 2021 12:55 PM    GLUCOSE 96 2021 12:55 PM    BUN 26 (H) 2021 12:55 PM    CREATININE 1.09 2021 12:55 PM     Lab Results   Component Value Date/Time    ALKPHOSPHAT 77 2021 12:55 PM    ASTSGOT 27 2021 12:55 PM    ALTSGPT 24 2021 12:55 PM    TBILIRUBIN 0.5 2021 12:55 PM      Lab Results   Component Value Date/Time    WBC 7.5 2021 12:55 PM     EKG today shows normal sinus rhythm.    EKG from 2021 reviewed, emergency room notes reviewed.    Medical Decision Makin-year-old male patient with new onset paroxysmal atrial fibrillation.  Etiology most likely sleep apnea in his case.  Recommend nuclear stress test.  Start metoprolol 12.5 mg twice daily due to baseline bradycardia, start flecainide 50 mg twice daily.  Follow-up in 2 to 3 weeks with repeat EKG, uptitrate flecainide to 75 mg twice daily if he tolerates  Recommend sleep study referral at some point    This note was dictated using Dragon speech recognition software.    Sam MIRANDA  Interventional cardiologist  [facility], [address]  [address] [place] [address]  [place] [address]  Phone: [phone]  Fax: [phone]

## 2021-07-21 ENCOUNTER — TELEPHONE (OUTPATIENT)
Dept: CARDIOLOGY | Facility: MEDICAL CENTER | Age: 61
End: 2021-07-21

## 2021-07-21 ENCOUNTER — OFFICE VISIT (OUTPATIENT)
Dept: CARDIOLOGY | Facility: MEDICAL CENTER | Age: 61
End: 2021-07-21
Payer: COMMERCIAL

## 2021-07-21 VITALS
HEIGHT: 74 IN | RESPIRATION RATE: 12 BRPM | DIASTOLIC BLOOD PRESSURE: 76 MMHG | BODY MASS INDEX: 32.16 KG/M2 | SYSTOLIC BLOOD PRESSURE: 142 MMHG | HEART RATE: 54 BPM | WEIGHT: 250.6 LBS | OXYGEN SATURATION: 97 %

## 2021-07-21 DIAGNOSIS — R07.89 OTHER CHEST PAIN: ICD-10-CM

## 2021-07-21 DIAGNOSIS — I48.0 PAROXYSMAL ATRIAL FIBRILLATION (HCC): ICD-10-CM

## 2021-07-21 DIAGNOSIS — I48.0 PAF (PAROXYSMAL ATRIAL FIBRILLATION) (HCC): ICD-10-CM

## 2021-07-21 LAB — EKG IMPRESSION: NORMAL

## 2021-07-21 PROCEDURE — 99214 OFFICE O/P EST MOD 30 MIN: CPT | Performed by: NURSE PRACTITIONER

## 2021-07-21 PROCEDURE — 93000 ELECTROCARDIOGRAM COMPLETE: CPT | Performed by: INTERNAL MEDICINE

## 2021-07-21 ASSESSMENT — ENCOUNTER SYMPTOMS
HEMOPTYSIS: 0
CLAUDICATION: 0
DIZZINESS: 0
NAUSEA: 0
NERVOUS/ANXIOUS: 0
PND: 0
SPUTUM PRODUCTION: 0
SHORTNESS OF BREATH: 0
WHEEZING: 0
COUGH: 0
WEAKNESS: 0
ORTHOPNEA: 0
VOMITING: 0
PALPITATIONS: 1

## 2021-07-21 ASSESSMENT — FIBROSIS 4 INDEX: FIB4 SCORE: 1.54

## 2021-07-21 NOTE — TELEPHONE ENCOUNTER
Patient transferred to me directly from  reporting -150 x3hrs this morning with CP and SOB.    HR is currently 50s-70 but he is still complaining of intermittent CP and not feeling well. I advised him to go to the ER for potential cardioversion, but he is refusing because he thinks the ER is a waste of time and money.     No APRN appointments available today, scheduled for EKG check at 1315. ER precautions strongly urged should HR go back up or symptoms progress/worsen.     FLORENCEI to Dr. Taveras

## 2021-07-21 NOTE — TELEPHONE ENCOUNTER
Discussed with Dr. Taveras and Claritza WARREN. Ok to add to Claritza WARREN's schedule at 1330.    Called patient to notify and he is agreeable to the plan.     Teams message to have patient put on BRIANA's schedule.

## 2021-07-21 NOTE — PROGRESS NOTES
Chief Complaint   Patient presents with   • Chest Pain       Subjective:   Samir Godinez is a 61 y.o. male who presents today for same day appt for chest pain and SOB.     He is followed by Dr. Taveras in our clinic, history of PAF (on flecainide 100mg BID).     Called today with -150 with chest pain and sob. Intermittent chest pain correlating with elevated pulse.     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      Past Surgical History:   Procedure Laterality Date   • HIP REVISION TOTAL Right 10/19/2020    Procedure: REVISION, TOTAL ARTHROPLASTY, HIP;  Surgeon: Emigdio Sales M.D.;  Location: SURGERY [facility];  Service: Orthopedics   • CARPAL TUNNEL RELEASE Left 07/2020   • PB RECONSTR TOTAL SHOULDER IMPLANT Right 11/27/2019    Procedure: ARTHROPLASTY, SHOULDER, TOTAL;  Surgeon: Denzel Veliz M.D.;  Location: SURGERY [facility];  Service: Orthopedics   • PB REVISE MEDIAN N/CARPAL TUNNEL SURG Right 11/27/2019    Procedure: RELEASE, CARPAL TUNNEL;  Surgeon: Denzel Veliz M.D.;  Location: SURGERY [facility];  Service: Orthopedics   • PB RECONSTR TOTAL SHOULDER IMPLANT Left 9/25/2019    Procedure: ARTHROPLASTY, SHOULDER, TOTAL- ANATOMIC;  Surgeon: Denzel Veliz M.D.;  Location: SURGERY [facility];  Service: Orthopedics   • HIP REPLACEMENT, REVISION Right 2014   • HIP REPLACEMENT, TOTAL Right 2008   • HIP ARTHROSCOPY Right 2007   • KNEE ARTHROSCOPY Left 2000     History reviewed. No pertinent family history.  Social History     Socioeconomic History   • Marital status: Single     Spouse name: Not on file   • Number of children: Not on file   • Years of education: Not on file   • Highest education level: Not on file   Occupational History   • Not on file   Tobacco Use   •  Smoking status: Never Smoker   • Smokeless tobacco: Never Used   Vaping Use   • Vaping Use: Never used   Substance and Sexual Activity   • Alcohol use: Yes     Comment: Rarely   • Drug use: No   • Sexual activity: Not on file   Other Topics Concern   • Not on file   Social History Narrative    ** Merged History Encounter **          Social Determinants of Health     Financial Resource Strain:    • Difficulty of Paying Living Expenses:    Food Insecurity:    • Worried About Running Out of Food in the Last Year:    • Ran Out of Food in the Last Year:    Transportation Needs:    • Lack of Transportation (Medical):    • Lack of Transportation (Non-Medical):    Physical Activity:    • Days of Exercise per Week:    • Minutes of Exercise per Session:    Stress:    • Feeling of Stress :    Social Connections:    • Frequency of Communication with Friends and Family:    • Frequency of Social Gatherings with Friends and Family:    • Attends [employer] Services:    • Active Member of Clubs or Organizations:    • Attends Club or Organization Meetings:    • Marital Status:    Intimate Partner Violence:    • Fear of Current or Ex-Partner:    • Emotionally Abused:    • Physically Abused:    • Sexually Abused:      No Known Allergies  Outpatient Encounter Medications as of 7/21/2021   Medication Sig Dispense Refill   • aspirin EC (ECOTRIN) 81 MG Tablet Delayed Response Take 1 tablet by mouth every day. 90 tablet 3   • metoprolol tartrate (LOPRESSOR) 25 MG Tab Take 1 tablet by mouth 2 times a day. 60 tablet 11   • flecainide (TAMBOCOR) 50 MG tablet Take 2 Tablets by mouth 2 times a day. 60 tablet 3   • [DISCONTINUED] metoprolol tartrate (LOPRESSOR) 25 MG Tab Take 0.5 Tablets by mouth 2 times a day. 60 tablet 11   • [DISCONTINUED] aspirin EC (ECOTRIN) 81 MG Tablet Delayed Response Take 1 Tab by mouth 2 Times a Day. (Patient taking differently: Take 81 mg by mouth every 48 hours. (once in a while)) 60 Tab 0   • ibuprofen (MOTRIN) 800 MG  "Tab Take 2,400 mg by mouth every 8 hours as needed. Takes 3 tabs       No facility-administered encounter medications on file as of 7/21/2021.     Review of Systems   Constitutional: Negative for malaise/fatigue.   Respiratory: Negative for cough, hemoptysis, sputum production, shortness of breath and wheezing.    Cardiovascular: Positive for chest pain and palpitations. Negative for orthopnea, claudication, leg swelling and PND.   Gastrointestinal: Negative for nausea and vomiting.   Neurological: Negative for dizziness and weakness.   Psychiatric/Behavioral: The patient is not nervous/anxious.         Objective:   /76 (BP Location: Left arm, Patient Position: Sitting, BP Cuff Size: Adult)   Pulse (!) 54   Resp 12   Ht 1.88 m (6' 2\")   Wt 114 kg (250 lb 9.6 oz)   SpO2 97%   BMI 32.18 kg/m²     Physical Exam   Constitutional: He is oriented to person, place, and time. He appears well-developed. No distress.   HENT:   Head: Normocephalic and atraumatic.   Eyes: Pupils are equal, round, and reactive to light.   Neck: No JVD present.   Cardiovascular: Normal rate, regular rhythm and normal heart sounds.   Pulmonary/Chest: Effort normal and breath sounds normal.   Abdominal: Soft. Bowel sounds are normal. He exhibits no distension.   Neurological: He is alert and oriented to person, place, and time.   Skin: Skin is warm and dry. He is not diaphoretic.   Psychiatric: His behavior is normal. Judgment and thought content normal.       Assessment:     1. Other chest pain  EKG   2. Paroxysmal atrial fibrillation (HCC)  REFERRAL TO CARDIOLOGY    REFERRAL TO PULMONARY AND SLEEP MEDICINE    CANCELED: Holter Monitor Study       Medical Decision Making:  Today's Assessment / Status / Plan:     1. Paroxysmal atrial fibrillation:  - EKG today showed NSR with PAC  - continue flecainide 100mg BID   - increased metoprolol 25mg BID   - continue asa therapy [id number] (CHF/CM, HTN, Age, DM, CVA, Vascular disease, Gender) = " 0   - stress test pending   - TSH 2.4  - sleep apnea testing pending   - referral to EP service    Follow up with EP, sooner as needed.

## 2021-07-26 ENCOUNTER — TELEPHONE (OUTPATIENT)
Dept: CARDIOLOGY | Facility: MEDICAL CENTER | Age: 61
End: 2021-07-26

## 2021-07-26 ENCOUNTER — APPOINTMENT (OUTPATIENT)
Dept: CARDIOLOGY | Facility: MEDICAL CENTER | Age: 61
End: 2021-07-26
Payer: COMMERCIAL

## 2021-07-26 ENCOUNTER — OFFICE VISIT (OUTPATIENT)
Dept: CARDIOLOGY | Facility: MEDICAL CENTER | Age: 61
End: 2021-07-26
Payer: COMMERCIAL

## 2021-07-26 VITALS
HEIGHT: 74 IN | DIASTOLIC BLOOD PRESSURE: 72 MMHG | WEIGHT: 260 LBS | HEART RATE: 50 BPM | SYSTOLIC BLOOD PRESSURE: 148 MMHG | BODY MASS INDEX: 33.37 KG/M2 | OXYGEN SATURATION: 97 %

## 2021-07-26 DIAGNOSIS — I48.0 PAROXYSMAL ATRIAL FIBRILLATION (HCC): ICD-10-CM

## 2021-07-26 DIAGNOSIS — Z79.899 ENCOUNTER FOR MONITORING FLECAINIDE THERAPY: ICD-10-CM

## 2021-07-26 DIAGNOSIS — I49.5 SICK SINUS SYNDROME (HCC): ICD-10-CM

## 2021-07-26 DIAGNOSIS — Z51.81 ENCOUNTER FOR MONITORING FLECAINIDE THERAPY: ICD-10-CM

## 2021-07-26 PROCEDURE — 93000 ELECTROCARDIOGRAM COMPLETE: CPT | Performed by: INTERNAL MEDICINE

## 2021-07-26 PROCEDURE — 99204 OFFICE O/P NEW MOD 45 MIN: CPT | Mod: 25 | Performed by: INTERNAL MEDICINE

## 2021-07-26 ASSESSMENT — FIBROSIS 4 INDEX: FIB4 SCORE: 1.54

## 2021-07-26 NOTE — PROGRESS NOTES
"Arrhythmia Clinic Note (New Patient)    DOS: 7/26/2021    Referring physician: Sam Taveras    Chief complaint/Reason for consult: PAF    HPI:  Patient is a 60 yo M. Works as a supervisor in construction here. No significant previous medical history. More recently had palpitations chest discomfort. Found to be in AF with RVR in the emergency room. Converted spontaneously. Since then has had recurrence. Started on BB/IC agent. Failed initially lower dose but escalated dose of Flec now to 100 BID. Working to prevent AF but now significant sinus bradycardia causing him fatigue and he says \"mental fogginess\" on the medication. Does admit to heavy snoring at night as well. Here to establish care for his AF.    ROS (+in BOLD):  Constitutional: Fevers/chills/fatigue/weightloss  HEENT: Blurry vision/eye pain/sore throat/hearing loss  Respiratory: Shortness of breath/cough  Cardiovascular: Chest pain/palpitations/edema/orthopnea/syncope  GI: Nausea/vomitting/diarrhea  MSK: Arthralgias/myagias/muscle weakness  Skin: Rash/sores  Neurological: Numbness/tremors/vertigo  Endocrine: Excessive thirst/polyuria/cold intolerance/heat intolerance  Psych: Depression/anxiety    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        Past Surgical History:   Procedure Laterality Date   • HIP REVISION TOTAL Right 10/19/2020    Procedure: REVISION, TOTAL ARTHROPLASTY, HIP;  Surgeon: Emigdio Sales M.D.;  Location: SURGERY [facility];  Service: Orthopedics   • CARPAL TUNNEL RELEASE Left 07/2020   • PB RECONSTR TOTAL SHOULDER IMPLANT Right 11/27/2019    Procedure: ARTHROPLASTY, SHOULDER, TOTAL;  Surgeon: Denzel Veliz M.D.;  Location: SURGERY [facility];  Service: Orthopedics   • PB REVISE MEDIAN N/CARPAL TUNNEL SURG Right 11/27/2019    " Procedure: RELEASE, CARPAL TUNNEL;  Surgeon: Denzel Veliz M.D.;  Location: SURGERY [facility];  Service: Orthopedics   • PB RECONSTR TOTAL SHOULDER IMPLANT Left 9/25/2019    Procedure: ARTHROPLASTY, SHOULDER, TOTAL- ANATOMIC;  Surgeon: Denzel Veliz M.D.;  Location: SURGERY [facility];  Service: Orthopedics   • HIP REPLACEMENT, REVISION Right 2014   • HIP REPLACEMENT, TOTAL Right 2008   • HIP ARTHROSCOPY Right 2007   • KNEE ARTHROSCOPY Left 2000       Social History     Socioeconomic History   • Marital status: Single     Spouse name: Not on file   • Number of children: Not on file   • Years of education: Not on file   • Highest education level: Not on file   Occupational History   • Not on file   Tobacco Use   • Smoking status: Never Smoker   • Smokeless tobacco: Never Used   Vaping Use   • Vaping Use: Never used   Substance and Sexual Activity   • Alcohol use: Yes     Comment: Rarely   • Drug use: No   • Sexual activity: Not on file   Other Topics Concern   • Not on file   Social History Narrative    ** Merged History Encounter **          Social Determinants of Health     Financial Resource Strain:    • Difficulty of Paying Living Expenses:    Food Insecurity:    • Worried About Running Out of Food in the Last Year:    • Ran Out of Food in the Last Year:    Transportation Needs:    • Lack of Transportation (Medical):    • Lack of Transportation (Non-Medical):    Physical Activity:    • Days of Exercise per Week:    • Minutes of Exercise per Session:    Stress:    • Feeling of Stress :    Social Connections:    • Frequency of Communication with Friends and Family:    • Frequency of Social Gatherings with Friends and Family:    • Attends [employer] Services:    • Active Member of Clubs or Organizations:    • Attends Club or Organization Meetings:    • Marital Status:    Intimate Partner Violence:    • Fear of Current or Ex-Partner:    • Emotionally Abused:    • Physically Abused:    •  "Sexually Abused:        History reviewed. No pertinent family history.    No Known Allergies    Current Outpatient Medications   Medication Sig Dispense Refill   • aspirin EC (ECOTRIN) 81 MG Tablet Delayed Response Take 1 tablet by mouth every day. 90 tablet 3   • metoprolol tartrate (LOPRESSOR) 25 MG Tab Take 1 tablet by mouth 2 times a day. 60 tablet 11   • flecainide (TAMBOCOR) 50 MG tablet Take 2 Tablets by mouth 2 times a day. 60 tablet 3   • ibuprofen (MOTRIN) 800 MG Tab Take 2,400 mg by mouth every 8 hours as needed. Takes 3 tabs       No current facility-administered medications for this visit.       Physical Exam:  Vitals:    07/26/21 0917   BP: 148/72   BP Location: Left arm   Patient Position: Sitting   BP Cuff Size: Adult   Pulse: (!) 50   SpO2: 97%   Weight: 118 kg (260 lb)   Height: 1.88 m (6' 2\")     General appearance: NAD, conversant  Eyes: anicteric sclerae, no lid-lag; PERRLA  HENT: Atraumatic; moist mucous membranes, no ulcerations  Neck: Trachea midline; FROM, supple, no thyromegaly  Lungs: CTA, with normal respiratory effort and no intercostal retractions  CV: RRR, no MRGs, no JVD  Abdomen: Soft, non-tender; normal bowel sounds, no HSM  Extremities: No peripheral edema. No clubbing or cyanosis.  Skin: Normal temperature, turgor and texture; no rash or ulcers  Psych: Appropriate affect, alert and oriented to person, place and time      Data:  Labs reviewed  EKG interpreted by me:  Sinus eugene    Impression/Plan:  1. Paroxysmal atrial fibrillation (HCC)  EKG    EC-ECHOCARDIOGRAM COMPLETE W/ CONT    REFERRAL TO PULMONARY AND SLEEP MEDICINE     -Stress pending  -Will get echo to r/o structural heart disease  -I discussed that if he is not tolerating scheduled Flec at higher dose, can do PRN (pill in the pocket) and can do 200 as needed and another 100 if still in AF after 40 mins - 1 hr  -He wants to be off medication for this if possible and I discussed PV isolation as a potential " fix  -Risks/benefits discussed and he wants to proceed  -Will schedule AF ablation + formal sleep study for him    Mark Sanchez MD

## 2021-07-26 NOTE — TELEPHONE ENCOUNTER
----- Message from Suha Flood sent at 7/26/2021  1:27 PM PDT -----  Regarding: EPIC order needed for the HARLAN  Can you please order the HARLAN for the Ablation in Epic please?

## 2021-07-27 ENCOUNTER — TELEPHONE (OUTPATIENT)
Dept: CARDIOLOGY | Facility: MEDICAL CENTER | Age: 61
End: 2021-07-27

## 2021-07-27 NOTE — TELEPHONE ENCOUNTER
----- Message from Mark Sanchez M.D. sent at 7/26/2021 12:04 PM PDT -----  Let's schedule next available AF ablation. No meds to hold.

## 2021-07-27 NOTE — TELEPHONE ENCOUNTER
Patient is scheduled on 10-1-21 for an Afib ablation w/Andreas w/anesthesia with Dr. Sanchez. No meds to stop and patient to check in at 6:00 for an 8:00 procedure. Updated H&P to be done on admit by NP. Pre admit to call patient. Carto notified on 7-27-21 by email.

## 2021-07-28 LAB — EKG IMPRESSION: NORMAL

## 2021-08-11 ENCOUNTER — TELEPHONE (OUTPATIENT)
Dept: CARDIOLOGY | Facility: MEDICAL CENTER | Age: 61
End: 2021-08-11

## 2021-08-11 NOTE — TELEPHONE ENCOUNTER
Called pt per SC to reschedule appt until after ablation unless pt has symptoms to discuss with SC. No answer, LVM with office number to call back.

## 2021-08-16 ENCOUNTER — HOSPITAL ENCOUNTER (OUTPATIENT)
Dept: CARDIOLOGY | Facility: MEDICAL CENTER | Age: 61
End: 2021-08-16
Attending: INTERNAL MEDICINE
Payer: COMMERCIAL

## 2021-08-16 ENCOUNTER — TELEPHONE (OUTPATIENT)
Dept: CARDIOLOGY | Facility: MEDICAL CENTER | Age: 61
End: 2021-08-16

## 2021-08-16 DIAGNOSIS — I48.0 PAROXYSMAL ATRIAL FIBRILLATION (HCC): ICD-10-CM

## 2021-08-16 PROCEDURE — 93306 TTE W/DOPPLER COMPLETE: CPT

## 2021-08-16 NOTE — TELEPHONE ENCOUNTER
SS    Pt called regarding Rx flecainide (TAMBOCOR) 50 MG tablet and states that he has been having some side effects such as headaches, bloating and cramping. Pt also states that he had an Echo done today and wondering when he will hear about results. Please call pt back to further discuss recommendations and results at [phone]    Thank you.

## 2021-08-17 LAB
LV EJECT FRACT  99904: 60
LV EJECT FRACT MOD 2C 99903: 59.83
LV EJECT FRACT MOD 4C 99902: 64.46
LV EJECT FRACT MOD BP 99901: 59.07

## 2021-08-17 PROCEDURE — 93306 TTE W/DOPPLER COMPLETE: CPT | Mod: 26 | Performed by: INTERNAL MEDICINE

## 2021-08-18 NOTE — TELEPHONE ENCOUNTER
"to SS - please advise     Pt wondering if any other med to try other than flec.    HR averaging 60bpm, noticed blood pressure gone at last office visit up but doesn't check it at home. Pt stopped taking flec and only using PRN. Pt took one yesterday evening and caused him to feel very fatigued today.     Pt main complaints is heart \"flippity flopping\" and feeling very fatigued.     Pt did not c/o inc hrs but just doesn't feel well    [id number] per RT OV note is 0    Advised pt to try to rest, will f/u with SS to see if there is anything to make him more comfortable between now and his ablation in Oct  "

## 2021-08-19 RX ORDER — DRONEDARONE 400 MG/1
400 TABLET, FILM COATED ORAL 2 TIMES DAILY WITH MEALS
Qty: 60 TABLET | Refills: 1 | Status: SHIPPED
Start: 2021-08-19 | End: 2022-05-17

## 2021-08-19 NOTE — TELEPHONE ENCOUNTER
Mark Sanchez M.D.  You Yesterday (12:25 PM)     We can try him on Multaq 400 BID instead of flec to see if might work better for him     Discussed with pt, he will try the new medication.

## 2021-09-23 ENCOUNTER — SLEEP CENTER VISIT (OUTPATIENT)
Dept: SLEEP MEDICINE | Facility: MEDICAL CENTER | Age: 61
End: 2021-09-23
Payer: COMMERCIAL

## 2021-09-23 VITALS
DIASTOLIC BLOOD PRESSURE: 72 MMHG | HEIGHT: 74 IN | OXYGEN SATURATION: 96 % | SYSTOLIC BLOOD PRESSURE: 134 MMHG | WEIGHT: 246.5 LBS | HEART RATE: 60 BPM | BODY MASS INDEX: 31.63 KG/M2

## 2021-09-23 DIAGNOSIS — G47.30 BREATHING-RELATED SLEEP DISORDER: ICD-10-CM

## 2021-09-23 DIAGNOSIS — I48.0 PAROXYSMAL ATRIAL FIBRILLATION (HCC): ICD-10-CM

## 2021-09-23 PROCEDURE — 99204 OFFICE O/P NEW MOD 45 MIN: CPT | Performed by: PREVENTIVE MEDICINE

## 2021-09-23 ASSESSMENT — FIBROSIS 4 INDEX: FIB4 SCORE: 1.54

## 2021-09-23 NOTE — PROGRESS NOTES
"  CC: \" I guess I need a sleep study.\"    HPI:  Samir Godinez is a 61 y.o. male kindly referred by Rafael Forman M.D this patient has a past medical history of paroxysmal atrial fibrillation, insomnia, pulmonary nodules and multiple joint issues.  This patient works as a  from 5 AM to 7 PM.  He has never been a smoker he does not drink alcohol and he has 1 cup of coffee in the morning.        Symptom Summary:  This patient gives a history of maintenance insomnia, daytime fatigue, and not enough sleep.  This has been ongoing for the past couple of years.  Patient also reports the following    Non-restorative sleep: +  EPWORTH SCORE:    7/24, this is normal  Insomnia: +  Nocturnal awakenings: +  Nocturia: +  Fatigue: +  Falls asleep accidentally: Rarely  Napping or returning to bed after arising: Never  Restless legs: -  Limb movements during sleep: -  Nocturnal headaches: -  Morning Headaches: -    Significant comorbidities and modifying factors include see HPI      Patient Active Problem List    Diagnosis Date Noted   • Paroxysmal atrial fibrillation (HCC) 07/21/2021   • Rib pain on left side 10/13/2019   • Status post total shoulder arthroplasty, left 10/13/2019   • Insomnia 10/13/2019   • Right rib fracture 10/13/2019   • Pulmonary nodules 10/13/2019   • Synovitis of hip 04/01/2014   • S/P total hip arthroplasty 04/01/2014       Past Medical History:   Diagnosis Date   • Anxiety    • Daytime sleepiness    • Pain 11/14/2019    right shoulder.9/10   • Psychiatric disorder    • Snoring     no sleep study   • Unspecified disorder of the pituitary gland and its hypothalamic control         Past Surgical History:   Procedure Laterality Date   • HIP REVISION TOTAL Right 10/19/2020    Procedure: REVISION, TOTAL ARTHROPLASTY, HIP;  Surgeon: Emigdio Sales M.D.;  Location: SURGERY [facility];  Service: Orthopedics   • CARPAL TUNNEL RELEASE Left 07/2020   • PB RECONSTR TOTAL SHOULDER " IMPLANT Right 11/27/2019    Procedure: ARTHROPLASTY, SHOULDER, TOTAL;  Surgeon: Denzel Veliz M.D.;  Location: SURGERY [facility];  Service: Orthopedics   • PB REVISE MEDIAN N/CARPAL TUNNEL SURG Right 11/27/2019    Procedure: RELEASE, CARPAL TUNNEL;  Surgeon: Denzel Veliz M.D.;  Location: SURGERY [facility];  Service: Orthopedics   • PB RECONSTR TOTAL SHOULDER IMPLANT Left 9/25/2019    Procedure: ARTHROPLASTY, SHOULDER, TOTAL- ANATOMIC;  Surgeon: Denzel Veliz M.D.;  Location: SURGERY [facility];  Service: Orthopedics   • HIP REPLACEMENT, REVISION Right 2014   • HIP REPLACEMENT, TOTAL Right 2008   • HIP ARTHROSCOPY Right 2007   • KNEE ARTHROSCOPY Left 2000       History reviewed. No pertinent family history.    Social History     Socioeconomic History   • Marital status: Single     Spouse name: Not on file   • Number of children: Not on file   • Years of education: Not on file   • Highest education level: Not on file   Occupational History   • Not on file   Tobacco Use   • Smoking status: Never Smoker   • Smokeless tobacco: Never Used   Vaping Use   • Vaping Use: Never used   Substance and Sexual Activity   • Alcohol use: Yes     Comment: Rarely   • Drug use: No   • Sexual activity: Not on file   Other Topics Concern   • Not on file   Social History Narrative    ** Merged History Encounter **          Social Determinants of Health     Financial Resource Strain:    • Difficulty of Paying Living Expenses:    Food Insecurity:    • Worried About Running Out of Food in the Last Year:    • Ran Out of Food in the Last Year:    Transportation Needs:    • Lack of Transportation (Medical):    • Lack of Transportation (Non-Medical):    Physical Activity:    • Days of Exercise per Week:    • Minutes of Exercise per Session:    Stress:    • Feeling of Stress :    Social Connections:    • Frequency of Communication with Friends and Family:    • Frequency of Social Gatherings with Friends  "and Family:    • Attends [employer] Services:    • Active Member of Clubs or Organizations:    • Attends Club or Organization Meetings:    • Marital Status:    Intimate Partner Violence:    • Fear of Current or Ex-Partner:    • Emotionally Abused:    • Physically Abused:    • Sexually Abused:            ALLERGIES: Patient has no known allergies.    ROS    Constitutional: Denies weight loss, endorses daytime fatigue.  Ears/Nose/Mouth/Throat: Denies rhinitis/nasal congestion, injury, decayed teeth/toothaches.  Cardiovascular: Denies chest pain, tightness, palpitations, swelling in legs/feet, difficulty breathing when lying down but gets better when sitting up.   Respiratory: Denies shortness of breath while awake,  Sleep: per HPI  Gastrointestinal: Denies  difficulty swallowing,  heartburn.  Genitourinary: REPORTS nocturia  Musculoskeletal: Reports painful joints,  Neurological: Denies frequent headaches,weakness, dizziness.    PHYSICAL EXAM    Neck circumference: 17.0 inches  /72 (BP Location: Left arm, Patient Position: Sitting, BP Cuff Size: Adult)   Pulse 60   Ht 1.88 m (6' 2\")   Wt 112 kg (246 lb 8 oz)   SpO2 96%   BMI 31.65 kg/m²   Appearance: Well-nourished, well-developed,  looks stated age, no acute distress  Eyes:   EOMI  ENMT: MASKED  Neck: Supple, trachea midline  Respiratory effort:  No intercostal retractions or use of accessory muscles  Lung auscultation:  No wheezes rhonchi rubs or rales  Cardiac: No murmurs, rubs, or gallops; regular rhythm, normal rate; no edema  Musculoskeletal:  Grossly normal; gait and station normal  Neurologic:  oriented to person, time, place, and purpose; judgement intact  Psychiatric:  No depression, anxiety, agitation        Medical Decision Making:  The medical record was reviewed in its as pertains to this referral. This includes records from primary care, consultants notes,  referral request, hospital records, labs, imaging.    Any available diagnostic and " titration nocturnal polysomnograms, home sleep apnea tests, continuous nocturnal oximetry results, multiple sleep latency tests, and compliance reports were reviewed with the patient.    Assessment:    1. Breathing-related sleep disorder    - Overnight Home Sleep Study; Future    2. Paroxysmal atrial fibrillation (HCC)    - Overnight Home Sleep Study; Future      PLAN:   The patient has signs and symptoms consistent with obstructive sleep apnea hypopnea syndrome. Will schedule  a home sleep apnea test.    The risks of untreated sleep apnea were discussed with the patient at length. Patients with AZEEM are at increased risk of cardiovascular disease including coronary artery disease, systemic arterial hypertension, pulmonary arterial hypertension, cardiac arrhythmias, and stroke. AZEEM patients have an increased risk of motor vehicle accidents, type 2 diabetes, chronic kidney disease, and non-alcoholic liver disease. The patient was advised to avoid driving a motor vehicle when drowsy.        Have advised the patient to follow up with the appropriate healthcare practitioners for all other medical problems and issues.      Return to clinic 1 to 2 weeks after sleep study done

## 2021-09-28 ENCOUNTER — PRE-ADMISSION TESTING (OUTPATIENT)
Dept: ADMISSIONS | Facility: MEDICAL CENTER | Age: 61
End: 2021-09-28
Attending: INTERNAL MEDICINE
Payer: COMMERCIAL

## 2021-09-28 DIAGNOSIS — Z01.812 PRE-OPERATIVE LABORATORY EXAMINATION: ICD-10-CM

## 2021-09-28 DIAGNOSIS — Z01.810 PRE-OPERATIVE CARDIOVASCULAR EXAMINATION: ICD-10-CM

## 2021-09-28 DIAGNOSIS — Z01.811 PRE-OPERATIVE RESPIRATORY EXAMINATION: ICD-10-CM

## 2021-09-28 LAB
ALBUMIN SERPL BCP-MCNC: 4.3 G/DL (ref 3.2–4.9)
ALBUMIN/GLOB SERPL: 2 G/DL
ALP SERPL-CCNC: 70 U/L (ref 30–99)
ALT SERPL-CCNC: 26 U/L (ref 2–50)
ANION GAP SERPL CALC-SCNC: 9 MMOL/L (ref 7–16)
APTT PPP: 27.2 SEC (ref 24.7–36)
AST SERPL-CCNC: 26 U/L (ref 12–45)
BASOPHILS # BLD AUTO: 0.9 % (ref 0–1.8)
BASOPHILS # BLD: 0.06 K/UL (ref 0–0.12)
BILIRUB SERPL-MCNC: 0.4 MG/DL (ref 0.1–1.5)
BUN SERPL-MCNC: 22 MG/DL (ref 8–22)
CALCIUM SERPL-MCNC: 9.3 MG/DL (ref 8.5–10.5)
CHLORIDE SERPL-SCNC: 104 MMOL/L (ref 96–112)
CO2 SERPL-SCNC: 25 MMOL/L (ref 20–33)
CREAT SERPL-MCNC: 0.99 MG/DL (ref 0.5–1.4)
EKG IMPRESSION: NORMAL
EOSINOPHIL # BLD AUTO: 0.16 K/UL (ref 0–0.51)
EOSINOPHIL NFR BLD: 2.4 % (ref 0–6.9)
ERYTHROCYTE [DISTWIDTH] IN BLOOD BY AUTOMATED COUNT: 49.7 FL (ref 35.9–50)
GLOBULIN SER CALC-MCNC: 2.2 G/DL (ref 1.9–3.5)
GLUCOSE SERPL-MCNC: 109 MG/DL (ref 65–99)
HCT VFR BLD AUTO: 51.8 % (ref 42–52)
HGB BLD-MCNC: 17.2 G/DL (ref 14–18)
IMM GRANULOCYTES # BLD AUTO: 0.03 K/UL (ref 0–0.11)
IMM GRANULOCYTES NFR BLD AUTO: 0.4 % (ref 0–0.9)
INR PPP: 1.03 (ref 0.87–1.13)
LYMPHOCYTES # BLD AUTO: 2.38 K/UL (ref 1–4.8)
LYMPHOCYTES NFR BLD: 35.1 % (ref 22–41)
MCH RBC QN AUTO: 29.2 PG (ref 27–33)
MCHC RBC AUTO-ENTMCNC: 33.2 G/DL (ref 33.7–35.3)
MCV RBC AUTO: 87.9 FL (ref 81.4–97.8)
MONOCYTES # BLD AUTO: 0.79 K/UL (ref 0–0.85)
MONOCYTES NFR BLD AUTO: 11.7 % (ref 0–13.4)
NEUTROPHILS # BLD AUTO: 3.36 K/UL (ref 1.82–7.42)
NEUTROPHILS NFR BLD: 49.5 % (ref 44–72)
NRBC # BLD AUTO: 0 K/UL
NRBC BLD-RTO: 0 /100 WBC
PLATELET # BLD AUTO: 185 K/UL (ref 164–446)
PMV BLD AUTO: 10.2 FL (ref 9–12.9)
POTASSIUM SERPL-SCNC: 4.5 MMOL/L (ref 3.6–5.5)
PROT SERPL-MCNC: 6.5 G/DL (ref 6–8.2)
PROTHROMBIN TIME: 13.2 SEC (ref 12–14.6)
RBC # BLD AUTO: 5.89 M/UL (ref 4.7–6.1)
SODIUM SERPL-SCNC: 138 MMOL/L (ref 135–145)
WBC # BLD AUTO: 6.8 K/UL (ref 4.8–10.8)

## 2021-09-28 PROCEDURE — 93010 ELECTROCARDIOGRAM REPORT: CPT | Performed by: INTERNAL MEDICINE

## 2021-09-28 PROCEDURE — 85025 COMPLETE CBC W/AUTO DIFF WBC: CPT

## 2021-09-28 PROCEDURE — U0003 INFECTIOUS AGENT DETECTION BY NUCLEIC ACID (DNA OR RNA); SEVERE ACUTE RESPIRATORY SYNDROME CORONAVIRUS 2 (SARS-COV-2) (CORONAVIRUS DISEASE [COVID-19]), AMPLIFIED PROBE TECHNIQUE, MAKING USE OF HIGH THROUGHPUT TECHNOLOGIES AS DESCRIBED BY CMS-2020-01-R: HCPCS

## 2021-09-28 PROCEDURE — C9803 HOPD COVID-19 SPEC COLLECT: HCPCS

## 2021-09-28 PROCEDURE — 93005 ELECTROCARDIOGRAM TRACING: CPT

## 2021-09-28 PROCEDURE — 36415 COLL VENOUS BLD VENIPUNCTURE: CPT

## 2021-09-28 PROCEDURE — 85610 PROTHROMBIN TIME: CPT

## 2021-09-28 PROCEDURE — 80053 COMPREHEN METABOLIC PANEL: CPT

## 2021-09-28 PROCEDURE — 85730 THROMBOPLASTIN TIME PARTIAL: CPT

## 2021-09-28 PROCEDURE — U0005 INFEC AGEN DETEC AMPLI PROBE: HCPCS

## 2021-09-28 ASSESSMENT — FIBROSIS 4 INDEX: FIB4 SCORE: 1.54

## 2021-09-29 LAB
SARS-COV-2 RNA RESP QL NAA+PROBE: NOTDETECTED
SPECIMEN SOURCE: NORMAL

## 2021-09-30 NOTE — OR NURSING
COVID-19 Pre-surgery screening:  ?  1. Do you have an undiagnosed respiratory illness or symptoms such as coughing or sneezing, SOB, loss of taste or smell? (No)      2. Do you have an unexplained fever greater than 100.4 degrees Fahrenheit or 38 degrees Celsius? (No)     3. Have you had direct exposure to a patient who tested positive for Covid-19? (No)  ?  4. Have you traveled within the last 14 days? (No)  ?  Informed of visitor policy and mask use requirement YES

## 2021-10-01 ENCOUNTER — APPOINTMENT (OUTPATIENT)
Dept: CARDIOLOGY | Facility: MEDICAL CENTER | Age: 61
End: 2021-10-01
Attending: INTERNAL MEDICINE
Payer: COMMERCIAL

## 2021-10-01 ENCOUNTER — ANESTHESIA EVENT (OUTPATIENT)
Dept: CARDIOLOGY | Facility: MEDICAL CENTER | Age: 61
End: 2021-10-01
Payer: COMMERCIAL

## 2021-10-01 ENCOUNTER — HOSPITAL ENCOUNTER (OUTPATIENT)
Facility: MEDICAL CENTER | Age: 61
End: 2021-10-01
Attending: INTERNAL MEDICINE | Admitting: INTERNAL MEDICINE
Payer: COMMERCIAL

## 2021-10-01 ENCOUNTER — ANESTHESIA (OUTPATIENT)
Dept: CARDIOLOGY | Facility: MEDICAL CENTER | Age: 61
End: 2021-10-01
Payer: COMMERCIAL

## 2021-10-01 VITALS
OXYGEN SATURATION: 92 % | HEART RATE: 69 BPM | RESPIRATION RATE: 14 BRPM | SYSTOLIC BLOOD PRESSURE: 132 MMHG | TEMPERATURE: 97.2 F | HEIGHT: 74 IN | DIASTOLIC BLOOD PRESSURE: 64 MMHG | WEIGHT: 237.66 LBS | BODY MASS INDEX: 30.5 KG/M2

## 2021-10-01 DIAGNOSIS — I48.0 PAROXYSMAL ATRIAL FIBRILLATION (HCC): ICD-10-CM

## 2021-10-01 LAB
ACT BLD: 274 SEC (ref 74–137)
ACT BLD: 290 SEC (ref 74–137)
EKG IMPRESSION: NORMAL
HBV SURFACE AG SER QL: NORMAL
HCV AB SER QL: NORMAL
HIV 1+2 AB+HIV1 P24 AG SERPL QL IA: NORMAL

## 2021-10-01 PROCEDURE — 700101 HCHG RX REV CODE 250: Performed by: ANESTHESIOLOGY

## 2021-10-01 PROCEDURE — 93312 ECHO TRANSESOPHAGEAL: CPT | Mod: 26 | Performed by: INTERNAL MEDICINE

## 2021-10-01 PROCEDURE — 93662 INTRACARDIAC ECG (ICE): CPT

## 2021-10-01 PROCEDURE — 160002 HCHG RECOVERY MINUTES (STAT)

## 2021-10-01 PROCEDURE — 700111 HCHG RX REV CODE 636 W/ 250 OVERRIDE (IP)

## 2021-10-01 PROCEDURE — 700105 HCHG RX REV CODE 258: Performed by: INTERNAL MEDICINE

## 2021-10-01 PROCEDURE — 93010 ELECTROCARDIOGRAM REPORT: CPT | Mod: 59 | Performed by: INTERNAL MEDICINE

## 2021-10-01 PROCEDURE — 93613 INTRACARDIAC EPHYS 3D MAPG: CPT | Performed by: INTERNAL MEDICINE

## 2021-10-01 PROCEDURE — 93662 INTRACARDIAC ECG (ICE): CPT | Mod: 26 | Performed by: INTERNAL MEDICINE

## 2021-10-01 PROCEDURE — 93325 DOPPLER ECHO COLOR FLOW MAPG: CPT

## 2021-10-01 PROCEDURE — 85347 COAGULATION TIME ACTIVATED: CPT

## 2021-10-01 PROCEDURE — 93656 COMPRE EP EVAL ABLTJ ATR FIB: CPT | Performed by: INTERNAL MEDICINE

## 2021-10-01 PROCEDURE — 700111 HCHG RX REV CODE 636 W/ 250 OVERRIDE (IP): Performed by: ANESTHESIOLOGY

## 2021-10-01 PROCEDURE — 93005 ELECTROCARDIOGRAM TRACING: CPT | Performed by: INTERNAL MEDICINE

## 2021-10-01 RX ORDER — HEPARIN SODIUM 200 [USP'U]/100ML
INJECTION, SOLUTION INTRAVENOUS
Status: COMPLETED
Start: 2021-10-01 | End: 2021-10-01

## 2021-10-01 RX ORDER — OXYCODONE HCL 5 MG/5 ML
10 SOLUTION, ORAL ORAL
Status: DISCONTINUED | OUTPATIENT
Start: 2021-10-01 | End: 2021-10-01 | Stop reason: HOSPADM

## 2021-10-01 RX ORDER — SODIUM CHLORIDE, SODIUM GLUCONATE, SODIUM ACETATE, POTASSIUM CHLORIDE AND MAGNESIUM CHLORIDE 526; 502; 368; 37; 30 MG/100ML; MG/100ML; MG/100ML; MG/100ML; MG/100ML
500 INJECTION, SOLUTION INTRAVENOUS CONTINUOUS
Status: DISCONTINUED | OUTPATIENT
Start: 2021-10-01 | End: 2021-10-01 | Stop reason: HOSPADM

## 2021-10-01 RX ORDER — ONDANSETRON 2 MG/ML
INJECTION INTRAMUSCULAR; INTRAVENOUS PRN
Status: DISCONTINUED | OUTPATIENT
Start: 2021-10-01 | End: 2021-10-01 | Stop reason: SURG

## 2021-10-01 RX ORDER — MIDAZOLAM HYDROCHLORIDE 1 MG/ML
INJECTION INTRAMUSCULAR; INTRAVENOUS
Status: COMPLETED
Start: 2021-10-01 | End: 2021-10-01

## 2021-10-01 RX ORDER — GABAPENTIN 300 MG/1
300 CAPSULE ORAL PRN
COMMUNITY
Start: 2021-08-24 | End: 2022-05-17

## 2021-10-01 RX ORDER — DIPHENHYDRAMINE HYDROCHLORIDE 50 MG/ML
12.5 INJECTION INTRAMUSCULAR; INTRAVENOUS
Status: DISCONTINUED | OUTPATIENT
Start: 2021-10-01 | End: 2021-10-01 | Stop reason: HOSPADM

## 2021-10-01 RX ORDER — PROTAMINE SULFATE 10 MG/ML
INJECTION, SOLUTION INTRAVENOUS
Status: COMPLETED
Start: 2021-10-01 | End: 2021-10-01

## 2021-10-01 RX ORDER — ONDANSETRON 2 MG/ML
4 INJECTION INTRAMUSCULAR; INTRAVENOUS EVERY 6 HOURS PRN
Status: DISCONTINUED | OUTPATIENT
Start: 2021-10-01 | End: 2021-10-01 | Stop reason: HOSPADM

## 2021-10-01 RX ORDER — SUCRALFATE 1 G/1
1 TABLET ORAL
Qty: 56 TABLET | Refills: 0 | Status: SHIPPED | OUTPATIENT
Start: 2021-10-01 | End: 2021-10-15

## 2021-10-01 RX ORDER — CALCIUM CHLORIDE 100 MG/ML
INJECTION INTRAVENOUS; INTRAVENTRICULAR
Status: DISCONTINUED
Start: 2021-10-01 | End: 2021-10-01 | Stop reason: HOSPADM

## 2021-10-01 RX ORDER — MIDAZOLAM HYDROCHLORIDE 1 MG/ML
1 INJECTION INTRAMUSCULAR; INTRAVENOUS
Status: DISCONTINUED | OUTPATIENT
Start: 2021-10-01 | End: 2021-10-01 | Stop reason: HOSPADM

## 2021-10-01 RX ORDER — MEPERIDINE HYDROCHLORIDE 25 MG/ML
12.5 INJECTION INTRAMUSCULAR; INTRAVENOUS; SUBCUTANEOUS
Status: DISCONTINUED | OUTPATIENT
Start: 2021-10-01 | End: 2021-10-01 | Stop reason: HOSPADM

## 2021-10-01 RX ORDER — CEFAZOLIN SODIUM 1 G/3ML
INJECTION, POWDER, FOR SOLUTION INTRAMUSCULAR; INTRAVENOUS PRN
Status: DISCONTINUED | OUTPATIENT
Start: 2021-10-01 | End: 2021-10-01 | Stop reason: SURG

## 2021-10-01 RX ORDER — TRAMADOL HYDROCHLORIDE 50 MG/1
50 TABLET ORAL EVERY 6 HOURS PRN
Status: DISCONTINUED | OUTPATIENT
Start: 2021-10-01 | End: 2021-10-01 | Stop reason: HOSPADM

## 2021-10-01 RX ORDER — PROTAMINE SULFATE 10 MG/ML
INJECTION, SOLUTION INTRAVENOUS PRN
Status: DISCONTINUED | OUTPATIENT
Start: 2021-10-01 | End: 2021-10-01 | Stop reason: SURG

## 2021-10-01 RX ORDER — LIDOCAINE HYDROCHLORIDE 20 MG/ML
INJECTION, SOLUTION EPIDURAL; INFILTRATION; INTRACAUDAL; PERINEURAL PRN
Status: DISCONTINUED | OUTPATIENT
Start: 2021-10-01 | End: 2021-10-01 | Stop reason: SURG

## 2021-10-01 RX ORDER — OMEPRAZOLE 20 MG/1
20 CAPSULE, DELAYED RELEASE ORAL DAILY
Qty: 30 CAPSULE | Refills: 0 | Status: SHIPPED
Start: 2021-10-01 | End: 2022-05-17

## 2021-10-01 RX ORDER — HALOPERIDOL 5 MG/ML
1 INJECTION INTRAMUSCULAR
Status: DISCONTINUED | OUTPATIENT
Start: 2021-10-01 | End: 2021-10-01 | Stop reason: HOSPADM

## 2021-10-01 RX ORDER — HYDROMORPHONE HYDROCHLORIDE 1 MG/ML
1 INJECTION, SOLUTION INTRAMUSCULAR; INTRAVENOUS; SUBCUTANEOUS
Status: DISCONTINUED | OUTPATIENT
Start: 2021-10-01 | End: 2021-10-01 | Stop reason: HOSPADM

## 2021-10-01 RX ORDER — HEPARIN SODIUM 1000 [USP'U]/ML
INJECTION, SOLUTION INTRAVENOUS; SUBCUTANEOUS
Status: COMPLETED
Start: 2021-10-01 | End: 2021-10-01

## 2021-10-01 RX ORDER — SODIUM CHLORIDE, SODIUM LACTATE, POTASSIUM CHLORIDE, CALCIUM CHLORIDE 600; 310; 30; 20 MG/100ML; MG/100ML; MG/100ML; MG/100ML
INJECTION, SOLUTION INTRAVENOUS CONTINUOUS
Status: ACTIVE | OUTPATIENT
Start: 2021-10-01 | End: 2021-10-01

## 2021-10-01 RX ORDER — DEXAMETHASONE SODIUM PHOSPHATE 4 MG/ML
INJECTION, SOLUTION INTRA-ARTICULAR; INTRALESIONAL; INTRAMUSCULAR; INTRAVENOUS; SOFT TISSUE PRN
Status: DISCONTINUED | OUTPATIENT
Start: 2021-10-01 | End: 2021-10-01 | Stop reason: SURG

## 2021-10-01 RX ORDER — IPRATROPIUM BROMIDE AND ALBUTEROL SULFATE 2.5; .5 MG/3ML; MG/3ML
3 SOLUTION RESPIRATORY (INHALATION)
Status: DISCONTINUED | OUTPATIENT
Start: 2021-10-01 | End: 2021-10-01 | Stop reason: HOSPADM

## 2021-10-01 RX ORDER — HYDROMORPHONE HYDROCHLORIDE 1 MG/ML
0.2 INJECTION, SOLUTION INTRAMUSCULAR; INTRAVENOUS; SUBCUTANEOUS
Status: DISCONTINUED | OUTPATIENT
Start: 2021-10-01 | End: 2021-10-01 | Stop reason: HOSPADM

## 2021-10-01 RX ORDER — OXYCODONE HCL 5 MG/5 ML
5 SOLUTION, ORAL ORAL
Status: DISCONTINUED | OUTPATIENT
Start: 2021-10-01 | End: 2021-10-01 | Stop reason: HOSPADM

## 2021-10-01 RX ORDER — KETOROLAC TROMETHAMINE 30 MG/ML
INJECTION, SOLUTION INTRAMUSCULAR; INTRAVENOUS PRN
Status: DISCONTINUED | OUTPATIENT
Start: 2021-10-01 | End: 2021-10-01 | Stop reason: SURG

## 2021-10-01 RX ORDER — ONDANSETRON 2 MG/ML
4 INJECTION INTRAMUSCULAR; INTRAVENOUS
Status: DISCONTINUED | OUTPATIENT
Start: 2021-10-01 | End: 2021-10-01 | Stop reason: HOSPADM

## 2021-10-01 RX ORDER — HYDROMORPHONE HYDROCHLORIDE 1 MG/ML
0.4 INJECTION, SOLUTION INTRAMUSCULAR; INTRAVENOUS; SUBCUTANEOUS
Status: DISCONTINUED | OUTPATIENT
Start: 2021-10-01 | End: 2021-10-01 | Stop reason: HOSPADM

## 2021-10-01 RX ADMIN — KETOROLAC TROMETHAMINE 30 MG: 30 INJECTION, SOLUTION INTRAMUSCULAR at 10:10

## 2021-10-01 RX ADMIN — HEPARIN SODIUM 17000 UNITS: 1000 INJECTION, SOLUTION INTRAVENOUS; SUBCUTANEOUS at 09:58

## 2021-10-01 RX ADMIN — FENTANYL CITRATE 100 MCG: 50 INJECTION, SOLUTION INTRAMUSCULAR; INTRAVENOUS at 09:49

## 2021-10-01 RX ADMIN — CEFAZOLIN 2 G: 330 INJECTION, POWDER, FOR SOLUTION INTRAMUSCULAR; INTRAVENOUS at 08:28

## 2021-10-01 RX ADMIN — HEPARIN SODIUM 2000 UNITS: 1000 INJECTION, SOLUTION INTRAVENOUS; SUBCUTANEOUS at 08:44

## 2021-10-01 RX ADMIN — GLYCOPYRROLATE 0.2 MG: 0.2 INJECTION INTRAMUSCULAR; INTRAVENOUS at 08:21

## 2021-10-01 RX ADMIN — LIDOCAINE HYDROCHLORIDE 50 MG: 20 INJECTION, SOLUTION EPIDURAL; INFILTRATION; INTRACAUDAL at 08:21

## 2021-10-01 RX ADMIN — ROCURONIUM BROMIDE 100 MG: 10 INJECTION, SOLUTION INTRAVENOUS at 08:21

## 2021-10-01 RX ADMIN — SODIUM CHLORIDE, POTASSIUM CHLORIDE, SODIUM LACTATE AND CALCIUM CHLORIDE: 600; 310; 30; 20 INJECTION, SOLUTION INTRAVENOUS at 08:07

## 2021-10-01 RX ADMIN — FENTANYL CITRATE 100 MCG: 50 INJECTION, SOLUTION INTRAMUSCULAR; INTRAVENOUS at 08:14

## 2021-10-01 RX ADMIN — MIDAZOLAM 2 MG: 1 INJECTION INTRAMUSCULAR; INTRAVENOUS at 08:14

## 2021-10-01 RX ADMIN — SUGAMMADEX 200 MG: 100 INJECTION, SOLUTION INTRAVENOUS at 10:10

## 2021-10-01 RX ADMIN — EPHEDRINE SULFATE 5 MG: 50 INJECTION INTRAMUSCULAR; INTRAVENOUS; SUBCUTANEOUS at 08:22

## 2021-10-01 RX ADMIN — ONDANSETRON 4 MG: 2 INJECTION INTRAMUSCULAR; INTRAVENOUS at 10:10

## 2021-10-01 RX ADMIN — FENTANYL CITRATE 100 MCG: 50 INJECTION, SOLUTION INTRAMUSCULAR; INTRAVENOUS at 10:15

## 2021-10-01 RX ADMIN — PROTAMINE SULFATE 30 MG: 10 INJECTION, SOLUTION INTRAVENOUS at 09:58

## 2021-10-01 RX ADMIN — ROCURONIUM BROMIDE 20 MG: 10 INJECTION, SOLUTION INTRAVENOUS at 09:49

## 2021-10-01 RX ADMIN — PROPOFOL 150 MG: 10 INJECTION, EMULSION INTRAVENOUS at 08:21

## 2021-10-01 RX ADMIN — SODIUM CHLORIDE, POTASSIUM CHLORIDE, SODIUM LACTATE AND CALCIUM CHLORIDE: 600; 310; 30; 20 INJECTION, SOLUTION INTRAVENOUS at 09:42

## 2021-10-01 RX ADMIN — DEXAMETHASONE SODIUM PHOSPHATE 8 MG: 4 INJECTION, SOLUTION INTRA-ARTICULAR; INTRALESIONAL; INTRAMUSCULAR; INTRAVENOUS; SOFT TISSUE at 08:23

## 2021-10-01 ASSESSMENT — PAIN SCALES - GENERAL: PAIN_LEVEL: 2

## 2021-10-01 ASSESSMENT — FIBROSIS 4 INDEX: FIB4 SCORE: 1.68

## 2021-10-01 NOTE — OR NURSING
1225-martha groin sites remain soft, dressings CDI, pt stood at side of bed, no bleeding noted, bedrest time complete, pt transferred to stage 2

## 2021-10-01 NOTE — OR NURSING
1235 - Received pt from Recovery. No complaints of pain or N/V at this time. VSS. Dressing to R groin and L groin. R groin small amount of dry blood but no signs of hematoma and not currently bleeding. L groin site CDI.    1248 - Pt up and ambulated around unit. No signs of bleeding from bilat groin sites or hematoma    1307 - Discharge orders received. IV taken out. All belongings returned to pt. Pt changed into clothing with assistance. Pt up and ambulated around unit. Discharge instructions given and discussed as well as pain management handout. Pt verbalizes understandings and all questions answered at this time. Pt states they are ready to be D/C home. Prescriptions given to pt and verbalizes understanding of medications. Pt D/C via wheelchair with all belongings with RN

## 2021-10-01 NOTE — ANESTHESIA PROCEDURE NOTES
Airway    Date/Time: 10/1/2021 8:21 AM  Performed by: Narciso Zabala III, M.D.  Authorized by: Narciso Zabala III, M.D.     Location:  OR  Urgency:  Elective  Difficult Airway: No    Indications for Airway Management:  Anesthesia      Spontaneous Ventilation: absent    Sedation Level:  Deep  Preoxygenated: Yes    Patient Position:  Sniffing  Final Airway Type:  Endotracheal airway  Final Endotracheal Airway:  ETT  Cuffed: Yes    Technique Used for Successful ETT Placement:  Direct laryngoscopy    Insertion Site:  Oral  Blade Type:  Mullins  Laryngoscope Blade/Videolaryngoscope Blade Size:  3  ETT Size (mm):  8.0  Measured from:  Lips  ETT to Lips (cm):  24  Placement Verified by: auscultation and capnometry    Cormack-Lehane Classification:  Grade IIb - view of arytenoids or posterior of glottis only  Number of Attempts at Approach:  1

## 2021-10-01 NOTE — ANESTHESIA POSTPROCEDURE EVALUATION
Patient: Samir Godinez    Procedure Summary     Date: 10/01/21 Room / Location: [facility] CATH [facility]    Anesthesia Start: 0807 Anesthesia Stop: 1031    Procedure: CL-EP ABLATION ATRIAL FIBRILLATION Diagnosis:       Paroxysmal atrial fibrillation (HCC)      (See Associated Dx)    Scheduled Providers: Mark Sanchez M.D.; Narciso Zabala III, M.D. Responsible Provider: Narciso Zabala III, M.D.    Anesthesia Type: general ASA Status: 3          Final Anesthesia Type: general  Last vitals  BP   Blood Pressure: 144/68    Temp   36.3 °C (97.4 °F)    Pulse   66   Resp   12    SpO2   100 %      Anesthesia Post Evaluation    Patient location during evaluation: PACU  Patient participation: complete - patient participated  Level of consciousness: awake and alert  Pain score: 2    Airway patency: patent  Anesthetic complications: no  Cardiovascular status: hemodynamically stable  Respiratory status: acceptable  Hydration status: euvolemic    PONV: none          No complications documented.     Nurse Pain Score: 2 (NPRS)

## 2021-10-01 NOTE — OP REPORT
[facility]   Electrophysiology Procedure Note    Procedure(s) Performed:   1) Atrial fibrillation ablation and EP study  2) 3D mapping  3) ICE during diagnostics an intervention    Indication(s):  Paroxysmal atrial fibrillation    Physician(s): Mark Sanchez M.D.     Resident/Assistant(s): None     Anesthesia: General endotracheal anesthetic     Specimen(s) Removed: None     Estimated Blood Loss:  30cc     Complications:  None     Description of Procedure:   After informed written consent, the patient was brought to the EP lab in the fasting, non-sedated state. The patient was prepped and draped in the usual sterile fashion. Femoral venous access was obtained using the modified Seldinger technique. In the left femoral vein, 1 sheaths (7 Fr) were inserted over 0.035” guidewires. In the right femoral vein, 3 sheaths (8,8,10 Fr) were inserted over 0.035” guidewires. A deflectable decapolar catheter was advanced to the CS position. An intracardiac echo (ICE) catheter was advanced to the right atrium. ICE was used to identify the atrial septum, left atrial appendage, and pulmonary veins and elvie the anatomic structures to superimpose on our 3D electroanatomic map using CARTOSound. During the procedure, ICE was utilized to localize the ablation catheter, monitor for thrombus formation, and to exclude pericardial effusion. Intravenous heparin was administered to maintain -350 seconds. Double transseptal left heart catheterization was performed under intracardiac echo and hemodynamic guidance. A Berthoud needle was inserted into the 8.5 Fr sheaths (Berthoud Torflex) for transseptal puncture and placement of sheaths in the left atrium. The initial Torflex was exchanged for a medium curved Biosense TradeCard and re-used for a second trans-septal. Mapping of the pulmonary veins and left atrium was performed using CARTO3 FAM and a deflectable multipolar mapping catheter (NoteSick PentaRaBridgeXs). Wide antral circumferential ablation was  performed using an 3.5 mm deflectable irrigated force contact catheter (Biosense ST SF) at primarily 30-40 W power starting from the L sided veins and then proceeding along to the RSPV and then finally the RIPV. Bidirectional pulmonary vein antrum isolation was verified at the end of the procedure by pacing inside the vein and confirming exit block along with absence of local PV signals on the PentaRay. At the end of the procedure, heparin was reversed with protamine, the catheter and sheaths were removed, and hemostasis was achieved by manual compression along with Vascade MVP closure device. Following recovery from anesthesia, the patient was transferred to the PPU in good condition.       Total ablation time: 982 seconds    Fluoroscopy time: 2.7 minutes     Electrophysiologic Findings:    1. Sinus  919 ms, AH 82 ms, HV 45 ms. AVBCL = 255 ms.    Impressions:    1. Paroxysmal atrial fibrillation.    2. Successful RF ablation pulmonary vein isolation procedure.       Recommendations:  1. Start anticoagulation.  2. Start carafate BID x 2 weeks and daily PPI x 1 month.  3. Transfer to monitored bedrest.

## 2021-10-01 NOTE — ANESTHESIA PROCEDURE NOTES
Arterial Line  Performed by: Narciso Zabala III, M.D.  Authorized by: Narciso Zabala III, M.D.     Start Time:  10/1/2021 8:27 AM  End Time:  10/1/2021 8:28 AM  Localization: surface landmarks    Patient Location:  OR  Indication: continuous blood pressure monitoring        Catheter Size:  20 G  Seldinger Technique?: Yes    Laterality:  Right  Site:  Radial artery  Line Secured:  Antimicrobial disc, tape and transparent dressing  Events: patient tolerated procedure well with no complications     Done under GA

## 2021-10-01 NOTE — ANESTHESIA PREPROCEDURE EVALUATION
Relevant Problems   CARDIAC   (positive) Paroxysmal atrial fibrillation (HCC)      Other   (positive) Synovitis of hip       Physical Exam    Airway   TM distance: >3 FB  Neck ROM: limited       Cardiovascular - normal exam  Rhythm: regular  Rate: normal  (-) murmur     Dental - normal exam           Pulmonary - normal exam  Breath sounds clear to auscultation     Abdominal    Neurological - normal exam         Other findings: Muscular/BMI>30, ?AZEEM, PAF, DJD            Anesthesia Plan    ASA 3       Plan - general       Airway plan will be ETT    (Arterial line)      Induction: intravenous    Postoperative Plan: Postoperative administration of opioids is intended.    Pertinent diagnostic labs and testing reviewed    Informed Consent:    Anesthetic plan and risks discussed with patient.    Use of blood products discussed with: patient whom consented to blood products.

## 2021-10-01 NOTE — ANESTHESIA TIME REPORT
Anesthesia Start and Stop Event Times     Date Time Event    10/1/2021 0725 Ready for Procedure     0807 Anesthesia Start     1031 Anesthesia Stop        Responsible Staff  10/01/21    Name Role Begin End    Narciso Zabala III, M.D. Anesth 0807 1031        Preop Diagnosis (Free Text):  Pre-op Diagnosis             Preop Diagnosis (Codes):    Premium Reason  Non-Premium    Comments:

## 2021-10-01 NOTE — DISCHARGE INSTRUCTIONS
ACTIVITY: Rest and take it easy for the first 24 hours.  A responsible adult is recommended to remain with you during that time.  It is normal to feel sleepy.  We encourage you to not do anything that requires balance, judgment or coordination.    MILD FLU-LIKE SYMPTOMS ARE NORMAL. YOU MAY EXPERIENCE GENERALIZED MUSCLE ACHES, THROAT IRRITATION, HEADACHE AND/OR SOME NAUSEA.    FOR 24 HOURS DO NOT:  Drive, operate machinery or run household appliances.  Drink beer or alcoholic beverages.   Make important decisions or sign legal documents.    SPECIAL INSTRUCTIONS: Post Ablation    Patient Care Instructions  1. No lifting > 10 lbs x 1 week.      2. No soaking in baths, hot tubs, pools x 1 week.  May shower the day after discharge and take off groin dressings and leave uncovered.  Continue to monitor sites daily for warmth, redness, discolored drainage.  It is common to have a small lump in the area where the cather was (usually the size of a small marble); this will go away but takes approximately 6 weeks to normalize.     3.   Please take all medications as prescribed to you; please do not stop any medications prescribed post ablation unless directed by your healthcare provider.      4.   Please do not miss any doses of your blood thinner (if you have been started on, or take chronic blood thinners) without discussion with your healthcare provider first.     5.   Please walk and take deep breaths after discharge.  After discharge, if  experiences neurological changes/signs of stroke, high fever, you should be seen in the emergency dept.     6.   It is possible you may experience some chest discomfort or chest tightness post ablation.  This is usually secondary to inflammation and irritation of the tissues at the area of the ablation.  If this occurs, it is advised to try 400 mg of Ibuprofen with food as needed up to three times a day for a maximum of two days.  This should help to decrease pain and tissue  inflammation.          **Please notify the office ([phone]) if this occurs.         ** DO NOT TAKE Ibuprofen IF HISTORY OF SIGNIFICANT BLEEDING OR KIDNEY DISEASE WITHOUT DISCUSSING WITH YOUR CARDIOLOGY PROVIDER FIRST.          ** If pain becomes severe or you have additional symptoms you may need to be medically evaluated; please contact the cardiology office ([phone]) for further direction.     7. It is possible that you may experience arrhythmia/Atrial Fibrillation post ablation.  This is secondary to irritation and inflammation of the cardiac tissues from the ablation.  If you have atrial fibrillation all day or feel poorly with it, please notify your cardiologist's via phone ([phone]) or [employer].      8.  Please contact call our office ([phone]) or message via [employer] message if you have any questions or concerns post procedurally.    9. You need to be seen for post ablation follow up 2-4 weeks post procedure.  If you do not have a follow up appointment  already scheduled, please call [phone] to schedule your follow up appointment.    POST ANGIOGRAM  General Care Instructions  • Maintain a bandage over the incision site for 24 hours.  It's normal to find a small bruise or dime-sized lump at the insertion site. This should disappear within a few weeks.  • Do not apply lotions or powders to the site.  Do not immerse the catheter insertion site in water (bathtub/swimming) for five days. It is ok to shower 24 hours after the procedure.  • You may resume your normal diet immediately; on the day of your procedure, drink 6-10 glasses of water to help flush the contrast liquid out of your system.  • If the doctor inserted the catheter in through your groin:  o Walking short distances on a flat surface is OK. Limit going up/down stairs for the first 2 days.  o DO NOT do yard work, drive, squat, lift heavy objects, or play sports for 2 days; or until your health care provider tells you it is  OK.  • If the doctor inserted the catheter in your arm:  o For 24 hours, DO NOT lift anything heavier than 10 pounds (approximately a gallon of milk). DO NOT do any heavy pushing, pulling, or twisting.    Medications  • If your current medications need to be changed, you will be provided with an updated list of your medications prior to discharge.  • If you take warfarin (Coumadin), resume taking your usual dose the evening after the procedure.  • DO NOT STOP taking prescribed blood thinning (anti-platelet) medications unless instructed by your cardiologist.  These medications include:  o Aspirin, Clopidogrel (Plavix), Ticagrelor (Brilinta), or Prasugrel (Effient)   • If you take one of the following anticoagulants, RESUME 24 HOURS after your procedure:  o Apixiban (Eliquis), Rivaroxaban (Xarelto), Dabigatran (Pradaxa), Edoxaban (Savaysa)  • If you take metformin (Glucophage), RESUME 48 HOURS after your procedure.    When to call your healthcare provider  Call your cardiologist right away at [phone] if you have any of the following:  ?  Problems/Concerns taking any of your prescribed heart medicines.  ?  The insertion site has increasing pain, swelling, redness, bleeding, or drainage.  ?  Your arm or leg below where the insertion site changes color, is cool, or is numb.  ?  You have chest pain or shortness of breath that does not go away with rest.  ?  Your pulse feels irregular -- very slow (less than 60 beats/minute) or very fast (over 100 beats/minute).  ?  You have dizziness, fainting, or you are very tired.  ?  You are coughing up blood or yellow or green mucus.  ?  You have chills or a fever over 101°F (38.3°C).   ?  If there is bleeding at the catheter insertion site, apply pressure for 10 minutes.  If bleeding persists, call [phone], and continue to hold pressure until advanced medical support arrives.        Exercising Safely After Percutaneous Coronary Intervention (PCI)  After percutaneous coronary  intervention (PCI), which involves angioplasty and often stenting, it's important to focus on your heart health. Exercise can help strengthen your heart. It can also help you feel good and improve your overall health. Talk with your health care provider or cardiac rehab team member about good options for you.  • Start slowly. Work up to more vigorous exercise as you get stronger. Aim for at least 150 minutes of exercise each week.  • Include aerobic activities. These make the heart beat faster. They work the heart and lungs, and improve the body's ability to use oxygen. Good choices include walking, swimming, and biking .  Always follow your doctor's recommendation for exercise.   You have been referred to cardiac rehabilitation, which is important for your recovery.  You may contact [facility] at [phone] to learn more and schedule a visit.        Lifestyle Management After Percutaneous Coronary Intervention (PCI)  Percutaneous coronary intervention (PCI)  involves angioplasty and often stenting. This procedure can open arteries and relieve symptoms. But, it doesn't cure coronary artery disease. New blockages can still form. You need to take steps to prevent this by managing risk factors. Doing so will help make your heart and arteries healthier. Your doctor may prescribe cardiac rehabilitation to help with this lifelong process.  Understanding risk factors  Some risk factors for coronary artery disease can be controlled. These include smoking, high blood pressure, cholesterol, diabetes, and obesity. They can be managed with medication, diet, and exercise. Support and counseling can also play a role. The effort will pay off! Managing risk factors can help you be more active, feel better, and reduce the risk of heart attack.    If you smoke, quit!  If your doctor has been urging you to quit smoking, it's for good reasons. Smoking damages your heart, blood vessels, and lungs. The good  news is that quitting can halt or even reverse the damage of smoking. To quit now:  • Get medical help. Ask your doctor for advice on stop-smoking programs. Also ask about medications or nicotine replacement therapy products that may help you quit smoking.  • Get support. Join a support group. Ask for help from your family and friends.  • Don't give up. It often takes several tries to succeed in quitting smoking.  • Avoid secondhand smoke. Ask family and friends not to smoke around you.      DIET: To avoid nausea, slowly advance diet as tolerated, avoiding spicy or greasy foods for the first day.  Add more substantial food to your diet according to your physician's instructions.  Babies can be fed formula or breast milk as soon as they are hungry.  INCREASE FLUIDS AND FIBER TO AVOID CONSTIPATION.    SURGICAL DRESSING/BATHING: *may remove dressing in 24 hours*    FOLLOW-UP APPOINTMENT:  A follow-up appointment should be arranged with your doctor; call to schedule.    You should CALL YOUR PHYSICIAN if you develop:  Fever greater than 101 degrees F.  Pain not relieved by medication, or persistent nausea or vomiting.  Excessive bleeding (blood soaking through dressing) or unexpected drainage from the wound.  Extreme redness or swelling around the incision site, drainage of pus or foul smelling drainage.  Inability to urinate or empty your bladder within 8 hours.  Problems with breathing or chest pain.    You should call 911 if you develop problems with breathing or chest pain.  If you are unable to contact your doctor or surgical center, you should go to the nearest emergency room or urgent care center.  Physician's telephone #: [phone]    If any questions arise, call your doctor.  If your doctor is not available, please feel free to call the Surgical Center at [phone]. The Contact Center is open Monday through Friday 7AM to 5PM and may speak to a nurse at [phone], or toll free at [phone].     A  registered nurse may call you a few days after your surgery to see how you are doing after your procedure.    MEDICATIONS: Resume taking daily medication.  Take prescribed pain medication with food.  If no medication is prescribed, you may take non-aspirin pain medication if needed.  PAIN MEDICATION CAN BE VERY CONSTIPATING.  Take a stool softener or laxative such as senokot, pericolace, or milk of magnesia if needed.    Prescription given for Eliquis, carafate, prilosec .  Last pain medication given at None given in PACU.    If your physician has prescribed pain medication that includes Acetaminophen (Tylenol), do not take additional Acetaminophen (Tylenol) while taking the prescribed medication.    Depression / Suicide Risk    As you are discharged from this [facility] facility, it is important to learn how to keep safe from harming yourself.    Recognize the warning signs:  · Abrupt changes in personality, positive or negative- including increase in energy   · Giving away possessions  · Change in eating patterns- significant weight changes-  positive or negative  · Change in sleeping patterns- unable to sleep or sleeping all the time   · Unwillingness or inability to communicate  · Depression  · Unusual sadness, discouragement and loneliness  · Talk of wanting to die  · Neglect of personal appearance   · Rebelliousness- reckless behavior  · Withdrawal from people/activities they love  · Confusion- inability to concentrate     If you or a loved one observes any of these behaviors or has concerns about self-harm, here's what you can do:  · Talk about it- your feelings and reasons for harming yourself  · Remove any means that you might use to hurt yourself (examples: pills, rope, extension cords, firearm)  · Get professional help from the community (Mental Health, Substance Abuse, psychological counseling)  · Do not be alone:Call your Safe Contact- someone whom you trust who will be there for you.  · Call your  local CRISIS HOTLINE 630-3521 or 440-444-6680  · Call your local Children's Mobile Crisis Response Team [place] (392) 392-0871 or www.[employer]  · Call the toll free National Suicide Prevention Hotlines   · National Suicide Prevention Lifeline 248-393-METJ (1449)  · National [employer] Network 800-SUICIDE (203-7266)

## 2021-10-21 ENCOUNTER — TELEPHONE (OUTPATIENT)
Dept: CARDIOLOGY | Facility: MEDICAL CENTER | Age: 61
End: 2021-10-21

## 2022-01-07 ENCOUNTER — HOSPITAL ENCOUNTER (OUTPATIENT)
Facility: MEDICAL CENTER | Age: 62
End: 2022-01-07
Attending: STUDENT IN AN ORGANIZED HEALTH CARE EDUCATION/TRAINING PROGRAM | Admitting: STUDENT IN AN ORGANIZED HEALTH CARE EDUCATION/TRAINING PROGRAM
Payer: COMMERCIAL

## 2022-01-07 DIAGNOSIS — M43.16 SPONDYLOLISTHESIS, LUMBAR REGION: ICD-10-CM

## 2022-03-01 ENCOUNTER — APPOINTMENT (RX ONLY)
Dept: URBAN - METROPOLITAN AREA CLINIC 6 | Facility: CLINIC | Age: 62
Setting detail: DERMATOLOGY
End: 2022-03-01

## 2022-03-01 DIAGNOSIS — L70.8 OTHER ACNE: ICD-10-CM

## 2022-03-01 DIAGNOSIS — D22 MELANOCYTIC NEVI: ICD-10-CM

## 2022-03-01 DIAGNOSIS — L81.4 OTHER MELANIN HYPERPIGMENTATION: ICD-10-CM

## 2022-03-01 PROBLEM — L30.9 DERMATITIS, UNSPECIFIED: Status: ACTIVE | Noted: 2022-03-01

## 2022-03-01 PROBLEM — C44.319 BASAL CELL CARCINOMA OF SKIN OF OTHER PARTS OF FACE: Status: ACTIVE | Noted: 2022-03-01

## 2022-03-01 PROBLEM — C44.219 BASAL CELL CARCINOMA OF SKIN OF LEFT EAR AND EXTERNAL AURICULAR CANAL: Status: ACTIVE | Noted: 2022-03-01

## 2022-03-01 PROBLEM — C44.41 BASAL CELL CARCINOMA OF SKIN OF SCALP AND NECK: Status: ACTIVE | Noted: 2022-03-01

## 2022-03-01 PROBLEM — D22.5 MELANOCYTIC NEVI OF TRUNK: Status: ACTIVE | Noted: 2022-03-01

## 2022-03-01 PROCEDURE — ? BIOPSY BY SHAVE METHOD

## 2022-03-01 PROCEDURE — 99203 OFFICE O/P NEW LOW 30 MIN: CPT | Mod: 25

## 2022-03-01 PROCEDURE — ? COUNSELING

## 2022-03-01 PROCEDURE — ? ADDITIONAL NOTES

## 2022-03-01 PROCEDURE — ? TREATMENT REGIMEN

## 2022-03-01 PROCEDURE — 11102 TANGNTL BX SKIN SINGLE LES: CPT

## 2022-03-01 ASSESSMENT — LOCATION SIMPLE DESCRIPTION DERM
LOCATION SIMPLE: RIGHT UPPER BACK
LOCATION SIMPLE: LEFT CLAVICULAR SKIN
LOCATION SIMPLE: LEFT UPPER BACK

## 2022-03-01 ASSESSMENT — LOCATION DETAILED DESCRIPTION DERM
LOCATION DETAILED: RIGHT MID-UPPER BACK
LOCATION DETAILED: LEFT SUPERIOR UPPER BACK
LOCATION DETAILED: LEFT CLAVICULAR SKIN

## 2022-03-01 ASSESSMENT — LOCATION ZONE DERM: LOCATION ZONE: TRUNK

## 2022-03-01 NOTE — PROCEDURE: ADDITIONAL NOTES
Render Risk Assessment In Note?: no
Additional Notes: Discussed topical RX vs Mohs - patient prefers surgery (?may consider Erivedge for size/area preop); will obtain path and send to [facility] scheduling
Detail Level: Simple

## 2022-03-12 ENCOUNTER — RX ONLY (OUTPATIENT)
Age: 62
Setting detail: RX ONLY
End: 2022-03-12

## 2022-03-12 RX ORDER — AMOXICILLIN 500 MG/1
4 TABLETS TABLET, FILM COATED ORAL ONCE
Qty: 4 | Refills: 0 | Status: ERX | COMMUNITY
Start: 2022-03-11

## 2022-03-24 ENCOUNTER — APPOINTMENT (RX ONLY)
Dept: URBAN - METROPOLITAN AREA CLINIC 22 | Facility: CLINIC | Age: 62
Setting detail: DERMATOLOGY
End: 2022-03-24

## 2022-03-24 PROBLEM — C44.219 BASAL CELL CARCINOMA OF SKIN OF LEFT EAR AND EXTERNAL AURICULAR CANAL: Status: ACTIVE | Noted: 2022-03-24

## 2022-03-24 PROCEDURE — ? REFERRAL CORRESPONDENCE

## 2022-03-24 PROCEDURE — 17312 MOHS ADDL STAGE: CPT

## 2022-03-24 PROCEDURE — ? MOHS SURGERY

## 2022-03-24 PROCEDURE — 17311 MOHS 1 STAGE H/N/HF/G: CPT

## 2022-03-24 NOTE — PROCEDURE: MOHS SURGERY
Mohs Case Number: [id number]
Previous Accession (Optional): [id number]
Biopsy Photograph Reviewed: No (no photograph available)
Referring Physician (Optional): Jacki Dee MD— path from [facility], [place]
Consent Type: Consent 1 (Standard)
Eye Shield Used: No
Surgeon Performing Repair (Optional): Luther Maciel M.D.
Initial Size Of Lesion: 0.8
Number Of Stages: 2
Primary Defect Width In Cm (Final Defect Size - Required For Flaps/Grafts): 1.8
Repair Type: Repair deferred until later date to obtain clear margins
Oculoplastic Surgeon Procedure Text (A): After obtaining clear surgical margins the patient was sent to oculoplastics for surgical repair.  The patient understands they will receive post-surgical care and follow-up from the referring physician's office.
Otolaryngologist Procedure Text (A): After obtaining clear surgical margins the patient was sent to otolaryngology for surgical repair.  The patient understands they will receive post-surgical care and follow-up from the referring physician's office.
Plastic Surgeon Procedure Text (A): After obtaining clear surgical margins the patient was sent to plastics for surgical repair.  The patient understands they will receive post-surgical care and follow-up from the referring physician's office.
Mid-Level Procedure Text (A): After obtaining clear surgical margins the patient was sent to a mid-level provider for surgical repair.  The patient understands they will receive post-surgical care and follow-up from the mid-level provider.
Provider Procedure Text (A): After obtaining clear surgical margins the defect was repaired by another provider.
Asc Procedure Text (A): After obtaining clear surgical margins the patient was sent to an ASC for surgical repair.  The patient understands they will receive post-surgical care and follow-up from the ASC physician.
Simple / Intermediate / Complex Repair - Final Wound Length In Cm: 0
Suturegard Retention Suture: 2-0 Nylon
Retention Suture Bite Size: 3 mm
Length To Time In Minutes Device Was In Place: 10
Number Of Hemigard Strips Per Side: 1
Undermining Type: Entire Wound
Debridement Text: The wound edges were debrided prior to proceeding with the closure to facilitate wound healing.
Helical Rim Text: The closure involved the helical rim.
Vermilion Border Text: The closure involved the vermilion border.
Nostril Rim Text: The closure involved the nostril rim.
Retention Suture Text: Retention sutures were placed to support the closure and prevent dehiscence.
Include Size Of Lesion In Location Indication Statement: Yes
Area H Indication Text: Tumors in this location are included in Area H (eyelids, eyebrows, nose, lips, chin, ear, pre-auricular, post-auricular, temple, genitalia, hands, feet, ankles and areola).  Tissue conservation is critical in these anatomic locations.
Area M Indication Text: Tumors in this location are included in Area M (cheek, forehead, scalp, neck, jawline and pretibial skin).  Mohs surgery is indicated for tumors in these anatomic locations.
Area L Indication Text: Tumors in this location are included in Area L (trunk and extremities).  Mohs surgery is indicated for larger tumors, or tumors with aggressive histologic features, in these anatomic locations.
Tumor Debulked?: curette
Depth Of Tumor Invasion (For Histology): dermis
Surgical Defect Length In Cm (Optional): 1.6
Surgical Defect Width In Cm (Optional): 1.2
Special Stains Stage 1 - Results: Base On Clearance Noted Above
Stage 2: Additional Anesthesia Volume In Cc: 1.0
Stage 2: Additional Anesthesia Type: 1% lidocaine with epinephrine and a 1:10 solution of 8.4% sodium bicarbonate
Surgical Defect Length In Cm (Optional): 2.0
Stage 4: Additional Anesthesia Type: 1% lidocaine with epinephrine
Include Tumor Staging In Mohs Note?: Please Select the Appropriate Response
Staging Info: By selecting yes to the question above you will include information on AJCC 8 tumor staging in your Mohs note. Information on tumor staging will be automatically added for SCCs on the head and neck. AJCC 8 includes tumor size, tumor depth, perineural involvement and bone invasion.
Tumor Depth: Less than 6mm from granular layer and no invasion beyond the subcutaneous fat
Medical Necessity Statement: Based on my medical judgement, Mohs surgery is the most appropriate treatment for this cancer compared to other treatments.
Alternatives Discussed Intro (Do Not Add Period): I discussed alternative treatments to Mohs surgery and specifically discussed the risks and benefits of
Consent 1/Introductory Paragraph: The rationale for Mohs was explained to the patient and consent was obtained. The risks, benefits and alternatives to therapy were discussed in detail. Specifically, the risks of infection, scarring, bleeding, prolonged wound healing, incomplete removal, allergy to anesthesia, nerve injury and recurrence were addressed. Prior to the procedure, the treatment site was clearly identified and confirmed by the patient. All components of Universal Protocol/PAUSE Rule completed.
Consent 2/Introductory Paragraph: Mohs surgery was explained to the patient and consent was obtained. The risks, benefits and alternatives to therapy were discussed in detail. Specifically, the risks of infection, scarring, bleeding, prolonged wound healing, incomplete removal, allergy to anesthesia, nerve injury and recurrence were addressed. Prior to the procedure, the treatment site was clearly identified and confirmed by the patient. All components of Universal Protocol/PAUSE Rule completed.
Consent 3/Introductory Paragraph: I gave the patient a chance to ask questions they had about the procedure.  Following this I explained the Mohs procedure and consent was obtained. The risks, benefits and alternatives to therapy were discussed in detail. Specifically, the risks of infection, scarring, bleeding, prolonged wound healing, incomplete removal, allergy to anesthesia, nerve injury and recurrence were addressed. Prior to the procedure, the treatment site was clearly identified and confirmed by the patient. All components of Universal Protocol/PAUSE Rule completed.
Consent (Temporal Branch)/Introductory Paragraph: The rationale for Mohs was explained to the patient and consent was obtained. The risks, benefits and alternatives to therapy were discussed in detail. Specifically, the risks of damage to the temporal branch of the facial nerve, infection, scarring, bleeding, prolonged wound healing, incomplete removal, allergy to anesthesia, and recurrence were addressed. Prior to the procedure, the treatment site was clearly identified and confirmed by the patient. All components of Universal Protocol/PAUSE Rule completed.
Consent (Marginal Mandibular)/Introductory Paragraph: The rationale for Mohs was explained to the patient and consent was obtained. The risks, benefits and alternatives to therapy were discussed in detail. Specifically, the risks of damage to the marginal mandibular branch of the facial nerve, infection, scarring, bleeding, prolonged wound healing, incomplete removal, allergy to anesthesia, and recurrence were addressed. Prior to the procedure, the treatment site was clearly identified and confirmed by the patient. All components of Universal Protocol/PAUSE Rule completed.
Consent (Spinal Accessory)/Introductory Paragraph: The rationale for Mohs was explained to the patient and consent was obtained. The risks, benefits and alternatives to therapy were discussed in detail. Specifically, the risks of damage to the spinal accessory nerve, infection, scarring, bleeding, prolonged wound healing, incomplete removal, allergy to anesthesia, and recurrence were addressed. Prior to the procedure, the treatment site was clearly identified and confirmed by the patient. All components of Universal Protocol/PAUSE Rule completed.
Consent (Near Eyelid Margin)/Introductory Paragraph: The rationale for Mohs was explained to the patient and consent was obtained. The risks, benefits and alternatives to therapy were discussed in detail. Specifically, the risks of ectropion or eyelid deformity, infection, scarring, bleeding, prolonged wound healing, incomplete removal, allergy to anesthesia, nerve injury and recurrence were addressed. Prior to the procedure, the treatment site was clearly identified and confirmed by the patient. All components of Universal Protocol/PAUSE Rule completed.
Consent (Ear)/Introductory Paragraph: The rationale for Mohs was explained to the patient and consent was obtained. The risks, benefits and alternatives to therapy were discussed in detail. Specifically, the risks of ear deformity, infection, scarring, bleeding, prolonged wound healing, incomplete removal, allergy to anesthesia, nerve injury and recurrence were addressed. Prior to the procedure, the treatment site was clearly identified and confirmed by the patient. All components of Universal Protocol/PAUSE Rule completed.
Consent (Nose)/Introductory Paragraph: The rationale for Mohs was explained to the patient and consent was obtained. The risks, benefits and alternatives to therapy were discussed in detail. Specifically, the risks of nasal deformity, changes in the flow of air through the nose, infection, scarring, bleeding, prolonged wound healing, incomplete removal, allergy to anesthesia, nerve injury and recurrence were addressed. Prior to the procedure, the treatment site was clearly identified and confirmed by the patient. All components of Universal Protocol/PAUSE Rule completed.
Consent (Lip)/Introductory Paragraph: The rationale for Mohs was explained to the patient and consent was obtained. The risks, benefits and alternatives to therapy were discussed in detail. Specifically, the risks of lip deformity, changes in the oral aperture, infection, scarring, bleeding, prolonged wound healing, incomplete removal, allergy to anesthesia, nerve injury and recurrence were addressed. Prior to the procedure, the treatment site was clearly identified and confirmed by the patient. All components of Universal Protocol/PAUSE Rule completed.
Consent (Scalp)/Introductory Paragraph: The rationale for Mohs was explained to the patient and consent was obtained. The risks, benefits and alternatives to therapy were discussed in detail. Specifically, the risks of changes in hair growth pattern secondary to repair, infection, scarring, bleeding, prolonged wound healing, incomplete removal, allergy to anesthesia, nerve injury and recurrence were addressed. Prior to the procedure, the treatment site was clearly identified and confirmed by the patient. All components of Universal Protocol/PAUSE Rule completed.
Detail Level: Detailed
Postop Diagnosis: same
Additional Postop Diagnosis: Basal Cell Carcinoma (nodular)
Anesthesia Volume In Cc: 4
Additional Anesthesia Volume In Cc: 6
Hemostasis: Electricator
Estimated Blood Loss (Cc): minimal
Brow Lift Text: A midfrontal incision was made medially to the defect to allow access to the tissues just superior to the left eyebrow. Following careful dissection inferiorly in a supraperiosteal plane to the level of the left eyebrow, several 3-0 monocryl sutures were used to resuspend the eyebrow orbicularis oculi muscular unit to the superior frontal bone periosteum. This resulted in an appropriate reapproximation of static eyebrow symmetry and correction of the left brow ptosis.
Suturegard Intro: Intraoperative tissue expansion was performed, utilizing the SUTUREGARD device, in order to reduce wound tension.
Suturegard Body: The suture ends were repeatedly re-tightened and re-clamped to achieve the desired tissue expansion.
Hemigard Intro: Due to skin fragility and wound tension, it was decided to use HEMIGARD adhesive retention suture devices to permit a linear closure. The skin was cleaned and dried for a 6cm distance away from the wound. Excessive hair, if present, was removed to allow for adhesion.
Hemigard Postcare Instructions: The HEMIGARD strips are to remain completely dry for at least 5-7 days.
Donor Site Anesthesia Type: same as repair anesthesia
Epidermal Closure Graft Donor Site (Optional): simple interrupted
Graft Donor Site Bandage (Optional-Leave Blank If You Don't Want In Note): Steri-strips and a pressure bandage were applied to the donor site.
Closure 2 Information: This tab is for additional flaps and grafts, including complex repair and grafts and complex repair and flaps. You can also specify a different location for the additional defect, if the location is the same you do not need to select a new one. We will insert the automated text for the repair you select below just as we do for solitary flaps and grafts. Please note that at this time if you select a location with a different insurance zone you will need to override the ICD10 and CPT if appropriate.
Closure 3 Information: This tab is for additional flaps and grafts above and beyond our usual structured repairs.  Please note if you enter information here it will not currently bill and you will need to add the billing information manually.
Wound Care: Petrolatum
Dressing: pressure dressing with telfa
Dressing (No Sutures): dry sterile dressing
Wound Check: 14 days
Unna Boot Text: An Unna boot was placed to help immobilize the limb and facilitate more rapid healing.
Home Suture Removal Text: Patient was provided instructions on removing sutures and will remove their sutures at home.  If they have any questions or difficulties they will call the office.
Post-Care Instructions: I reviewed with the patient in detail post-care instructions. Patient is not to engage in any heavy lifting, exercise, or swimming for the next 14 days. Should the patient develop any fevers, chills, bleeding, severe pain patient will contact the office immediately.
Pain Refusal Text: I offered to prescribe pain medication but the patient refused to take this medication.
Mauc Instructions: By selecting yes to the question below the MAUC number will be added into the note.  This will be calculated automatically based on the diagnosis chosen, the size entered, the body zone selected (H,M,L) and the specific indications you chose. You will also have the option to override the Mohs AUC if you disagree with the automatically calculated number and this option is found in the Case Summary tab.
Where Do You Want The Question To Include Opioid Counseling Located?: Case Summary Tab
Eye Protection Verbiage: Before proceeding with the stage, a plastic scleral shield was inserted. The globe was anesthetized with a few drops of 1% lidocaine with 1:100,000 epinephrine. Then, an appropriate sized scleral shield was chosen and coated with lacrilube ointment. The shield was gently inserted and left in place for the duration of each stage. After the stage was completed, the shield was gently removed.
Mohs Method Verbiage: An incision at a 45 degree angle following the standard Mohs approach was done and the specimen was harvested as a microscopic controlled layer.
Surgeon/Pathologist Verbiage (Will Incorporate Name Of Surgeon From Intro If Not Blank): operated in two distinct and integrated capacities as the surgeon and pathologist.
Mohs Histo Method Verbiage: Each section was then chromacoded and processed in the Mohs lab using the Mohs protocol and submitted for frozen section.
Subsequent Stages Histo Method Verbiage: Using a similar technique to that described above, a thin layer of tissue was removed from all areas where tumor was visible on the previous stage.  The tissue was again oriented, mapped, dyed, and processed as above.
Mohs Rapid Report Verbiage: The area of clinically evident tumor was marked with skin marking ink and appropriately hatched.  The initial incision was made following the Mohs approach through the skin.  The specimen was taken to the lab, divided into the necessary number of pieces, chromacoded and processed according to the Mohs protocol.  This was repeated in successive stages until a tumor free defect was achieved.
Complex Repair Preamble Text (Leave Blank If You Do Not Want): Extensive wide undermining was performed.
Intermediate Repair Preamble Text (Leave Blank If You Do Not Want): Undermining was performed with blunt dissection.
Non-Graft Cartilage Fenestration Text: The cartilage was fenestrated with a 2mm punch biopsy to help facilitate healing.
Graft Cartilage Fenestration Text: The cartilage was fenestrated with a 2mm punch biopsy to help facilitate graft survival and healing.
Secondary Intention Text (Leave Blank If You Do Not Want): The defect will heal with secondary intention.
No Repair - Repaired With Adjacent Surgical Defect Text (Leave Blank If You Do Not Want): After obtaining clear surgical margins the defect was repaired concurrently with another surgical defect which was in close approximation.
Adjacent Tissue Transfer Text: The defect edges were debeveled with a #15 scalpel blade.  Given the location of the defect and the proximity to free margins an adjacent tissue transfer was deemed most appropriate.  Using a sterile surgical marker, an appropriate flap was drawn incorporating the defect and placing the expected incisions within the relaxed skin tension lines where possible.    The area thus outlined was incised deep to adipose tissue with a #15 scalpel blade.  The skin margins were undermined to an appropriate distance in all directions utilizing iris scissors.
Advancement Flap (Single) Text: The defect edges were debeveled with a #15 scalpel blade.  Given the location of the defect and the proximity to free margins a single advancement flap was deemed most appropriate.  Using a sterile surgical marker, an appropriate advancement flap was drawn incorporating the defect and placing the expected incisions within the relaxed skin tension lines where possible.    The area thus outlined was incised deep to adipose tissue with a #15 scalpel blade.  The skin margins were undermined to an appropriate distance in all directions utilizing iris scissors.
Advancement Flap (Double) Text: The defect edges were debeveled with a #15 scalpel blade.  Given the location of the defect and the proximity to free margins a double advancement flap was deemed most appropriate.  Using a sterile surgical marker, the appropriate advancement flaps were drawn incorporating the defect and placing the expected incisions within the relaxed skin tension lines where possible.    The area thus outlined was incised deep to adipose tissue with a #15 scalpel blade.  The skin margins were undermined to an appropriate distance in all directions utilizing iris scissors.
Burow's Advancement Flap Text: The defect edges were debeveled with a #15 scalpel blade.  Given the location of the defect and the proximity to free margins a Burow's advancement flap was deemed most appropriate.  Using a sterile surgical marker, the appropriate advancement flap was drawn incorporating the defect and placing the expected incisions within the relaxed skin tension lines where possible.    The area thus outlined was incised deep to adipose tissue with a #15 scalpel blade.  The skin margins were undermined to an appropriate distance in all directions utilizing iris scissors.
Chonodrocutaneous Helical Advancement Flap Text: The defect edges were debeveled with a #15 scalpel blade.  Given the location of the defect and the proximity to free margins a chondrocutaneous helical advancement flap was deemed most appropriate.  Using a sterile surgical marker, the appropriate advancement flap was drawn incorporating the defect and placing the expected incisions within the relaxed skin tension lines where possible.    The area thus outlined was incised deep to adipose tissue with a #15 scalpel blade.  The skin margins were undermined to an appropriate distance in all directions utilizing iris scissors.
Crescentic Advancement Flap Text: The defect edges were debeveled with a #15 scalpel blade.  Given the location of the defect and the proximity to free margins a crescentic advancement flap was deemed most appropriate.  Using a sterile surgical marker, the appropriate advancement flap was drawn incorporating the defect and placing the expected incisions within the relaxed skin tension lines where possible.    The area thus outlined was incised deep to adipose tissue with a #15 scalpel blade.  The skin margins were undermined to an appropriate distance in all directions utilizing iris scissors.
A-T Advancement Flap Text: The defect edges were debeveled with a #15 scalpel blade.  Given the location of the defect, shape of the defect and the proximity to free margins an A-T advancement flap was deemed most appropriate.  Using a sterile surgical marker, an appropriate advancement flap was drawn incorporating the defect and placing the expected incisions within the relaxed skin tension lines where possible.    The area thus outlined was incised deep to adipose tissue with a #15 scalpel blade.  The skin margins were undermined to an appropriate distance in all directions utilizing iris scissors.
O-T Advancement Flap Text: The defect edges were debeveled with a #15 scalpel blade.  Given the location of the defect, shape of the defect and the proximity to free margins an O-T advancement flap was deemed most appropriate.  Using a sterile surgical marker, an appropriate advancement flap was drawn incorporating the defect and placing the expected incisions within the relaxed skin tension lines where possible.    The area thus outlined was incised deep to adipose tissue with a #15 scalpel blade.  The skin margins were undermined to an appropriate distance in all directions utilizing iris scissors.
O-L Flap Text: The defect edges were debeveled with a #15 scalpel blade.  Given the location of the defect, shape of the defect and the proximity to free margins an O-L flap was deemed most appropriate.  Using a sterile surgical marker, an appropriate advancement flap was drawn incorporating the defect and placing the expected incisions within the relaxed skin tension lines where possible.    The area thus outlined was incised deep to adipose tissue with a #15 scalpel blade.  The skin margins were undermined to an appropriate distance in all directions utilizing iris scissors.
O-Z Flap Text: The defect edges were debeveled with a #15 scalpel blade.  Given the location of the defect, shape of the defect and the proximity to free margins an O-Z flap was deemed most appropriate.  Using a sterile surgical marker, an appropriate transposition flap was drawn incorporating the defect and placing the expected incisions within the relaxed skin tension lines where possible. The area thus outlined was incised deep to adipose tissue with a #15 scalpel blade.  The skin margins were undermined to an appropriate distance in all directions utilizing iris scissors.
Double O-Z Flap Text: The defect edges were debeveled with a #15 scalpel blade.  Given the location of the defect, shape of the defect and the proximity to free margins a Double O-Z flap was deemed most appropriate.  Using a sterile surgical marker, an appropriate transposition flap was drawn incorporating the defect and placing the expected incisions within the relaxed skin tension lines where possible. The area thus outlined was incised deep to adipose tissue with a #15 scalpel blade.  The skin margins were undermined to an appropriate distance in all directions utilizing iris scissors.
V-Y Flap Text: The defect edges were debeveled with a #15 scalpel blade.  Given the location of the defect, shape of the defect and the proximity to free margins a V-Y flap was deemed most appropriate.  Using a sterile surgical marker, an appropriate advancement flap was drawn incorporating the defect and placing the expected incisions within the relaxed skin tension lines where possible.    The area thus outlined was incised deep to adipose tissue with a #15 scalpel blade.  The skin margins were undermined to an appropriate distance in all directions utilizing iris scissors.
Advancement-Rotation Flap Text: The defect edges were debeveled with a #15 scalpel blade.  Given the location of the defect, shape of the defect and the proximity to free margins an advancement-rotation flap was deemed most appropriate.  Using a sterile surgical marker, an appropriate flap was drawn incorporating the defect and placing the expected incisions within the relaxed skin tension lines where possible. The area thus outlined was incised deep to adipose tissue with a #15 scalpel blade.  The skin margins were undermined to an appropriate distance in all directions utilizing iris scissors.
Mercedes Flap Text: The defect edges were debeveled with a #15 scalpel blade.  Given the location of the defect, shape of the defect and the proximity to free margins a Mercedes flap was deemed most appropriate.  Using a sterile surgical marker, an appropriate advancement flap was drawn incorporating the defect and placing the expected incisions within the relaxed skin tension lines where possible. The area thus outlined was incised deep to adipose tissue with a #15 scalpel blade.  The skin margins were undermined to an appropriate distance in all directions utilizing iris scissors.
Modified Advancement Flap Text: The defect edges were debeveled with a #15 scalpel blade.  Given the location of the defect, shape of the defect and the proximity to free margins a modified advancement flap was deemed most appropriate.  Using a sterile surgical marker, an appropriate advancement flap was drawn incorporating the defect and placing the expected incisions within the relaxed skin tension lines where possible.    The area thus outlined was incised deep to adipose tissue with a #15 scalpel blade.  The skin margins were undermined to an appropriate distance in all directions utilizing iris scissors.
Mucosal Advancement Flap Text: Given the location of the defect, shape of the defect and the proximity to free margins a mucosal advancement flap was deemed most appropriate. Incisions were made with a 15 blade scalpel in the appropriate fashion along the cutaneous vermilion border and the mucosal lip. The remaining actinically damaged mucosal tissue was excised.  The mucosal advancement flap was then elevated to the gingival sulcus with care taken to preserve the neurovascular structures and advanced into the primary defect. Care was taken to ensure that precise realignment of the vermilion border was achieved.
Peng Advancement Flap Text: The defect edges were debeveled with a #15 scalpel blade.  Given the location of the defect, shape of the defect and the proximity to free margins a Peng advancement flap was deemed most appropriate.  Using a sterile surgical marker, an appropriate advancement flap was drawn incorporating the defect and placing the expected incisions within the relaxed skin tension lines where possible. The area thus outlined was incised deep to adipose tissue with a #15 scalpel blade.  The skin margins were undermined to an appropriate distance in all directions utilizing iris scissors.
Hatchet Flap Text: The defect edges were debeveled with a #15 scalpel blade.  Given the location of the defect, shape of the defect and the proximity to free margins a hatchet flap was deemed most appropriate.  Using a sterile surgical marker, an appropriate hatchet flap was drawn incorporating the defect and placing the expected incisions within the relaxed skin tension lines where possible.    The area thus outlined was incised deep to adipose tissue with a #15 scalpel blade.  The skin margins were undermined to an appropriate distance in all directions utilizing iris scissors.
Rotation Flap Text: The defect edges were debeveled with a #15 scalpel blade.  Given the location of the defect, shape of the defect and the proximity to free margins a rotation flap was deemed most appropriate.  Using a sterile surgical marker, an appropriate rotation flap was drawn incorporating the defect and placing the expected incisions within the relaxed skin tension lines where possible.    The area thus outlined was incised deep to adipose tissue with a #15 scalpel blade.  The skin margins were undermined to an appropriate distance in all directions utilizing iris scissors.
Spiral Flap Text: The defect edges were debeveled with a #15 scalpel blade.  Given the location of the defect, shape of the defect and the proximity to free margins a spiral flap was deemed most appropriate.  Using a sterile surgical marker, an appropriate rotation flap was drawn incorporating the defect and placing the expected incisions within the relaxed skin tension lines where possible. The area thus outlined was incised deep to adipose tissue with a #15 scalpel blade.  The skin margins were undermined to an appropriate distance in all directions utilizing iris scissors.
Staged Advancement Flap Text: The defect edges were debeveled with a #15 scalpel blade.  Given the location of the defect, shape of the defect and the proximity to free margins a staged advancement flap was deemed most appropriate.  Using a sterile surgical marker, an appropriate advancement flap was drawn incorporating the defect and placing the expected incisions within the relaxed skin tension lines where possible. The area thus outlined was incised deep to adipose tissue with a #15 scalpel blade.  The skin margins were undermined to an appropriate distance in all directions utilizing iris scissors.
Star Wedge Flap Text: The defect edges were debeveled with a #15 scalpel blade.  Given the location of the defect, shape of the defect and the proximity to free margins a star wedge flap was deemed most appropriate.  Using a sterile surgical marker, an appropriate rotation flap was drawn incorporating the defect and placing the expected incisions within the relaxed skin tension lines where possible. The area thus outlined was incised deep to adipose tissue with a #15 scalpel blade.  The skin margins were undermined to an appropriate distance in all directions utilizing iris scissors.
Transposition Flap Text: The defect edges were debeveled with a #15 scalpel blade.  Given the location of the defect and the proximity to free margins a transposition flap was deemed most appropriate.  Using a sterile surgical marker, an appropriate transposition flap was drawn incorporating the defect.    The area thus outlined was incised deep to adipose tissue with a #15 scalpel blade.  The skin margins were undermined to an appropriate distance in all directions utilizing iris scissors.
Muscle Hinge Flap Text: The defect edges were debeveled with a #15 scalpel blade.  Given the size, depth and location of the defect and the proximity to free margins a muscle hinge flap was deemed most appropriate.  Using a sterile surgical marker, an appropriate hinge flap was drawn incorporating the defect. The area thus outlined was incised with a #15 scalpel blade.  The skin margins were undermined to an appropriate distance in all directions utilizing iris scissors.
Mustarde Flap Text: The defect edges were debeveled with a #15 scalpel blade.  Given the size, depth and location of the defect and the proximity to free margins a Mustarde flap was deemed most appropriate.  Using a sterile surgical marker, an appropriate flap was drawn incorporating the defect. The area thus outlined was incised with a #15 scalpel blade.  The skin margins were undermined to an appropriate distance in all directions utilizing iris scissors.
Nasal Turnover Hinge Flap Text: The defect edges were debeveled with a #15 scalpel blade.  Given the size, depth, location of the defect and the defect being full thickness a nasal turnover hinge flap was deemed most appropriate.  Using a sterile surgical marker, an appropriate hinge flap was drawn incorporating the defect. The area thus outlined was incised with a #15 scalpel blade. The flap was designed to recreate the nasal mucosal lining and the alar rim. The skin margins were undermined to an appropriate distance in all directions utilizing iris scissors.
Nasalis-Muscle-Based Myocutaneous Island Pedicle Flap Text: Using a #15 blade, an incision was made around the donor flap to the level of the nasalis muscle. Wide lateral undermining was then performed in both the subcutaneous plane above the nasalis muscle, and in a submuscular plane just above periosteum. This allowed the formation of a free nasalis muscle axial pedicle (based on the angular artery) which was still attached to the actual cutaneous flap, increasing its mobility and vascular viability. Hemostasis was obtained with pinpoint electrocoagulation. The flap was mobilized into position and the pivotal anchor points positioned and stabilized with buried interrupted sutures. Subcutaneous and dermal tissues were closed in a multilayered fashion with sutures. Tissue redundancies were excised, and the epidermal edges were apposed without significant tension and sutured with sutures.
Orbicularis Oris Muscle Flap Text: The defect edges were debeveled with a #15 scalpel blade.  Given that the defect affected the competency of the oral sphincter an orbicularis oris muscle flap was deemed most appropriate to restore this competency and normal muscle function.  Using a sterile surgical marker, an appropriate flap was drawn incorporating the defect. The area thus outlined was incised with a #15 scalpel blade.
Melolabial Transposition Flap Text: The defect edges were debeveled with a #15 scalpel blade.  Given the location of the defect and the proximity to free margins a melolabial flap was deemed most appropriate.  Using a sterile surgical marker, an appropriate melolabial transposition flap was drawn incorporating the defect.    The area thus outlined was incised deep to adipose tissue with a #15 scalpel blade.  The skin margins were undermined to an appropriate distance in all directions utilizing iris scissors.
Rhombic Flap Text: The defect edges were debeveled with a #15 scalpel blade.  Given the location of the defect and the proximity to free margins a rhombic flap was deemed most appropriate.  Using a sterile surgical marker, an appropriate rhombic flap was drawn incorporating the defect.    The area thus outlined was incised deep to adipose tissue with a #15 scalpel blade.  The skin margins were undermined to an appropriate distance in all directions utilizing iris scissors.
Rhomboid Transposition Flap Text: The defect edges were debeveled with a #15 scalpel blade.  Given the location of the defect and the proximity to free margins a rhomboid transposition flap was deemed most appropriate.  Using a sterile surgical marker, an appropriate rhomboid flap was drawn incorporating the defect.    The area thus outlined was incised deep to adipose tissue with a #15 scalpel blade.  The skin margins were undermined to an appropriate distance in all directions utilizing iris scissors.
Bi-Rhombic Flap Text: The defect edges were debeveled with a #15 scalpel blade.  Given the location of the defect and the proximity to free margins a bi-rhombic flap was deemed most appropriate.  Using a sterile surgical marker, an appropriate rhombic flap was drawn incorporating the defect. The area thus outlined was incised deep to adipose tissue with a #15 scalpel blade.  The skin margins were undermined to an appropriate distance in all directions utilizing iris scissors.
Helical Rim Advancement Flap Text: The defect edges were debeveled with a #15 blade scalpel.  Given the location of the defect and the proximity to free margins (helical rim) a double helical rim advancement flap was deemed most appropriate.  Using a sterile surgical marker, the appropriate advancement flaps were drawn incorporating the defect and placing the expected incisions between the helical rim and antihelix where possible.  The area thus outlined was incised through and through with a #15 scalpel blade.  With a skin hook and iris scissors, the flaps were gently and sharply undermined and freed up.
Bilateral Helical Rim Advancement Flap Text: The defect edges were debeveled with a #15 blade scalpel.  Given the location of the defect and the proximity to free margins (helical rim) a bilateral helical rim advancement flap was deemed most appropriate.  Using a sterile surgical marker, the appropriate advancement flaps were drawn incorporating the defect and placing the expected incisions between the helical rim and antihelix where possible.  The area thus outlined was incised through and through with a #15 scalpel blade.  With a skin hook and iris scissors, the flaps were gently and sharply undermined and freed up.
Ear Star Wedge Flap Text: The defect edges were debeveled with a #15 blade scalpel.  Given the location of the defect and the proximity to free margins (helical rim) an ear star wedge flap was deemed most appropriate.  Using a sterile surgical marker, the appropriate flap was drawn incorporating the defect and placing the expected incisions between the helical rim and antihelix where possible.  The area thus outlined was incised through and through with a #15 scalpel blade.
Banner Transposition Flap Text: The defect edges were debeveled with a #15 scalpel blade.  Given the location of the defect and the proximity to free margins a Banner transposition flap was deemed most appropriate.  Using a sterile surgical marker, an appropriate flap drawn around the defect. The area thus outlined was incised deep to adipose tissue with a #15 scalpel blade.  The skin margins were undermined to an appropriate distance in all directions utilizing iris scissors.
Bilobed Flap Text: The defect edges were debeveled with a #15 scalpel blade.  Given the location of the defect and the proximity to free margins a bilobe flap was deemed most appropriate.  Using a sterile surgical marker, an appropriate bilobe flap drawn around the defect.    The area thus outlined was incised deep to adipose tissue with a #15 scalpel blade.  The skin margins were undermined to an appropriate distance in all directions utilizing iris scissors.
Bilobed Transposition Flap Text: The defect edges were debeveled with a #15 scalpel blade.  Given the location of the defect and the proximity to free margins a bilobed transposition flap was deemed most appropriate.  Using a sterile surgical marker, an appropriate bilobe flap drawn around the defect.    The area thus outlined was incised deep to adipose tissue with a #15 scalpel blade.  The skin margins were undermined to an appropriate distance in all directions utilizing iris scissors.
Trilobed Flap Text: The defect edges were debeveled with a #15 scalpel blade.  Given the location of the defect and the proximity to free margins a trilobed flap was deemed most appropriate.  Using a sterile surgical marker, an appropriate trilobed flap drawn around the defect.    The area thus outlined was incised deep to adipose tissue with a #15 scalpel blade.  The skin margins were undermined to an appropriate distance in all directions utilizing iris scissors.
Dorsal Nasal Flap Text: The defect edges were debeveled with a #15 scalpel blade.  Given the location of the defect and the proximity to free margins a dorsal nasal flap was deemed most appropriate.  Using a sterile surgical marker, an appropriate dorsal nasal flap was drawn around the defect.    The area thus outlined was incised deep to adipose tissue with a #15 scalpel blade.  The skin margins were undermined to an appropriate distance in all directions utilizing iris scissors.
Island Pedicle Flap Text: The defect edges were debeveled with a #15 scalpel blade.  Given the location of the defect, shape of the defect and the proximity to free margins an island pedicle advancement flap was deemed most appropriate.  Using a sterile surgical marker, an appropriate advancement flap was drawn incorporating the defect, outlining the appropriate donor tissue and placing the expected incisions within the relaxed skin tension lines where possible.    The area thus outlined was incised deep to adipose tissue with a #15 scalpel blade.  The skin margins were undermined to an appropriate distance in all directions around the primary defect and laterally outward around the island pedicle utilizing iris scissors.  There was minimal undermining beneath the pedicle flap.
Island Pedicle Flap With Canthal Suspension Text: The defect edges were debeveled with a #15 scalpel blade.  Given the location of the defect, shape of the defect and the proximity to free margins an island pedicle advancement flap was deemed most appropriate.  Using a sterile surgical marker, an appropriate advancement flap was drawn incorporating the defect, outlining the appropriate donor tissue and placing the expected incisions within the relaxed skin tension lines where possible. The area thus outlined was incised deep to adipose tissue with a #15 scalpel blade.  The skin margins were undermined to an appropriate distance in all directions around the primary defect and laterally outward around the island pedicle utilizing iris scissors.  There was minimal undermining beneath the pedicle flap. A suspension suture was placed in the canthal tendon to prevent tension and prevent ectropion.
Alar Island Pedicle Flap Text: The defect edges were debeveled with a #15 scalpel blade.  Given the location of the defect, shape of the defect and the proximity to the alar rim an island pedicle advancement flap was deemed most appropriate.  Using a sterile surgical marker, an appropriate advancement flap was drawn incorporating the defect, outlining the appropriate donor tissue and placing the expected incisions within the nasal ala running parallel to the alar rim. The area thus outlined was incised with a #15 scalpel blade.  The skin margins were undermined minimally to an appropriate distance in all directions around the primary defect and laterally outward around the island pedicle utilizing iris scissors.  There was minimal undermining beneath the pedicle flap.
Double Island Pedicle Flap Text: The defect edges were debeveled with a #15 scalpel blade.  Given the location of the defect, shape of the defect and the proximity to free margins a double island pedicle advancement flap was deemed most appropriate.  Using a sterile surgical marker, an appropriate advancement flap was drawn incorporating the defect, outlining the appropriate donor tissue and placing the expected incisions within the relaxed skin tension lines where possible.    The area thus outlined was incised deep to adipose tissue with a #15 scalpel blade.  The skin margins were undermined to an appropriate distance in all directions around the primary defect and laterally outward around the island pedicle utilizing iris scissors.  There was minimal undermining beneath the pedicle flap.
Island Pedicle Flap-Requiring Vessel Identification Text: The defect edges were debeveled with a #15 scalpel blade.  Given the location of the defect, shape of the defect and the proximity to free margins an island pedicle advancement flap was deemed most appropriate.  Using a sterile surgical marker, an appropriate advancement flap was drawn, based on the axial vessel mentioned above, incorporating the defect, outlining the appropriate donor tissue and placing the expected incisions within the relaxed skin tension lines where possible.    The area thus outlined was incised deep to adipose tissue with a #15 scalpel blade.  The skin margins were undermined to an appropriate distance in all directions around the primary defect and laterally outward around the island pedicle utilizing iris scissors.  There was minimal undermining beneath the pedicle flap.
Keystone Flap Text: The defect edges were debeveled with a #15 scalpel blade.  Given the location of the defect, shape of the defect a keystone flap was deemed most appropriate.  Using a sterile surgical marker, an appropriate keystone flap was drawn incorporating the defect, outlining the appropriate donor tissue and placing the expected incisions within the relaxed skin tension lines where possible. The area thus outlined was incised deep to adipose tissue with a #15 scalpel blade.  The skin margins were undermined to an appropriate distance in all directions around the primary defect and laterally outward around the flap utilizing iris scissors.
O-T Plasty Text: The defect edges were debeveled with a #15 scalpel blade.  Given the location of the defect, shape of the defect and the proximity to free margins an O-T plasty was deemed most appropriate.  Using a sterile surgical marker, an appropriate O-T plasty was drawn incorporating the defect and placing the expected incisions within the relaxed skin tension lines where possible.    The area thus outlined was incised deep to adipose tissue with a #15 scalpel blade.  The skin margins were undermined to an appropriate distance in all directions utilizing iris scissors.
O-Z Plasty Text: The defect edges were debeveled with a #15 scalpel blade.  Given the location of the defect, shape of the defect and the proximity to free margins an O-Z plasty (double transposition flap) was deemed most appropriate.  Using a sterile surgical marker, the appropriate transposition flaps were drawn incorporating the defect and placing the expected incisions within the relaxed skin tension lines where possible.    The area thus outlined was incised deep to adipose tissue with a #15 scalpel blade.  The skin margins were undermined to an appropriate distance in all directions utilizing iris scissors.  Hemostasis was achieved with electrocautery.  The flaps were then transposed into place, one clockwise and the other counterclockwise, and anchored with interrupted buried subcutaneous sutures.
Double O-Z Plasty Text: The defect edges were debeveled with a #15 scalpel blade.  Given the location of the defect, shape of the defect and the proximity to free margins a Double O-Z plasty (double transposition flap) was deemed most appropriate.  Using a sterile surgical marker, the appropriate transposition flaps were drawn incorporating the defect and placing the expected incisions within the relaxed skin tension lines where possible. The area thus outlined was incised deep to adipose tissue with a #15 scalpel blade.  The skin margins were undermined to an appropriate distance in all directions utilizing iris scissors.  Hemostasis was achieved with electrocautery.  The flaps were then transposed into place, one clockwise and the other counterclockwise, and anchored with interrupted buried subcutaneous sutures.
V-Y Plasty Text: The defect edges were debeveled with a #15 scalpel blade.  Given the location of the defect, shape of the defect and the proximity to free margins an V-Y advancement flap was deemed most appropriate.  Using a sterile surgical marker, an appropriate advancement flap was drawn incorporating the defect and placing the expected incisions within the relaxed skin tension lines where possible.    The area thus outlined was incised deep to adipose tissue with a #15 scalpel blade.  The skin margins were undermined to an appropriate distance in all directions utilizing iris scissors.
H Plasty Text: Given the location of the defect, shape of the defect and the proximity to free margins a H-plasty was deemed most appropriate for repair.  Using a sterile surgical marker, the appropriate advancement arms of the H-plasty were drawn incorporating the defect and placing the expected incisions within the relaxed skin tension lines where possible. The area thus outlined was incised deep to adipose tissue with a #15 scalpel blade. The skin margins were undermined to an appropriate distance in all directions utilizing iris scissors.  The opposing advancement arms were then advanced into place in opposite direction and anchored with interrupted buried subcutaneous sutures.
W Plasty Text: The lesion was extirpated to the level of the fat with a #15 scalpel blade.  Given the location of the defect, shape of the defect and the proximity to free margins a W-plasty was deemed most appropriate for repair.  Using a sterile surgical marker, the appropriate transposition arms of the W-plasty were drawn incorporating the defect and placing the expected incisions within the relaxed skin tension lines where possible.    The area thus outlined was incised deep to adipose tissue with a #15 scalpel blade.  The skin margins were undermined to an appropriate distance in all directions utilizing iris scissors.  The opposing transposition arms were then transposed into place in opposite direction and anchored with interrupted buried subcutaneous sutures.
Z Plasty Text: The lesion was extirpated to the level of the fat with a #15 scalpel blade.  Given the location of the defect, shape of the defect and the proximity to free margins a Z-plasty was deemed most appropriate for repair.  Using a sterile surgical marker, the appropriate transposition arms of the Z-plasty were drawn incorporating the defect and placing the expected incisions within the relaxed skin tension lines where possible.    The area thus outlined was incised deep to adipose tissue with a #15 scalpel blade.  The skin margins were undermined to an appropriate distance in all directions utilizing iris scissors.  The opposing transposition arms were then transposed into place in opposite direction and anchored with interrupted buried subcutaneous sutures.
Zygomaticofacial Flap Text: Given the location of the defect, shape of the defect and the proximity to free margins a zygomaticofacial flap was deemed most appropriate for repair.  Using a sterile surgical marker, the appropriate flap was drawn incorporating the defect and placing the expected incisions within the relaxed skin tension lines where possible. The area thus outlined was incised deep to adipose tissue with a #15 scalpel blade with preservation of a vascular pedicle.  The skin margins were undermined to an appropriate distance in all directions utilizing iris scissors.  The flap was then placed into the defect and anchored with interrupted buried subcutaneous sutures.
Cheek Interpolation Flap Text: A decision was made to reconstruct the defect utilizing an interpolation axial flap and a staged reconstruction.  A telfa template was made of the defect.  This telfa template was then used to outline the Cheek Interpolation flap.  The donor area for the pedicle flap was then injected with anesthesia.  The flap was excised through the skin and subcutaneous tissue down to the layer of the underlying musculature.  The interpolation flap was carefully excised within this deep plane to maintain its blood supply.  The edges of the donor site were undermined.   The donor site was closed in a primary fashion.  The pedicle was then rotated into position and sutured.  Once the tube was sutured into place, adequate blood supply was confirmed with blanching and refill.  The pedicle was then wrapped with xeroform gauze and dressed appropriately with a telfa and gauze bandage to ensure continued blood supply and protect the attached pedicle.
Cheek-To-Nose Interpolation Flap Text: A decision was made to reconstruct the defect utilizing an interpolation axial flap and a staged reconstruction.  A telfa template was made of the defect.  This telfa template was then used to outline the Cheek-To-Nose Interpolation flap.  The donor area for the pedicle flap was then injected with anesthesia.  The flap was excised through the skin and subcutaneous tissue down to the layer of the underlying musculature.  The interpolation flap was carefully excised within this deep plane to maintain its blood supply.  The edges of the donor site were undermined.   The donor site was closed in a primary fashion.  The pedicle was then rotated into position and sutured.  Once the tube was sutured into place, adequate blood supply was confirmed with blanching and refill.  The pedicle was then wrapped with xeroform gauze and dressed appropriately with a telfa and gauze bandage to ensure continued blood supply and protect the attached pedicle.
Interpolation Flap Text: A decision was made to reconstruct the defect utilizing an interpolation axial flap and a staged reconstruction.  A telfa template was made of the defect.  This telfa template was then used to outline the interpolation flap.  The donor area for the pedicle flap was then injected with anesthesia.  The flap was excised through the skin and subcutaneous tissue down to the layer of the underlying musculature.  The interpolation flap was carefully excised within this deep plane to maintain its blood supply.  The edges of the donor site were undermined.   The donor site was closed in a primary fashion.  The pedicle was then rotated into position and sutured.  Once the tube was sutured into place, adequate blood supply was confirmed with blanching and refill.  The pedicle was then wrapped with xeroform gauze and dressed appropriately with a telfa and gauze bandage to ensure continued blood supply and protect the attached pedicle.
Melolabial Interpolation Flap Text: A decision was made to reconstruct the defect utilizing an interpolation axial flap and a staged reconstruction.  A telfa template was made of the defect.  This telfa template was then used to outline the melolabial interpolation flap.  The donor area for the pedicle flap was then injected with anesthesia.  The flap was excised through the skin and subcutaneous tissue down to the layer of the underlying musculature.  The pedicle flap was carefully excised within this deep plane to maintain its blood supply.  The edges of the donor site were undermined.   The donor site was closed in a primary fashion.  The pedicle was then rotated into position and sutured.  Once the tube was sutured into place, adequate blood supply was confirmed with blanching and refill.  The pedicle was then wrapped with xeroform gauze and dressed appropriately with a telfa and gauze bandage to ensure continued blood supply and protect the attached pedicle.
Mastoid Interpolation Flap Text: A decision was made to reconstruct the defect utilizing an interpolation axial flap and a staged reconstruction.  A telfa template was made of the defect.  This telfa template was then used to outline the mastoid interpolation flap.  The donor area for the pedicle flap was then injected with anesthesia.  The flap was excised through the skin and subcutaneous tissue down to the layer of the underlying musculature.  The pedicle flap was carefully excised within this deep plane to maintain its blood supply.  The edges of the donor site were undermined.   The donor site was closed in a primary fashion.  The pedicle was then rotated into position and sutured.  Once the tube was sutured into place, adequate blood supply was confirmed with blanching and refill.  The pedicle was then wrapped with xeroform gauze and dressed appropriately with a telfa and gauze bandage to ensure continued blood supply and protect the attached pedicle.
Posterior Auricular Interpolation Flap Text: A decision was made to reconstruct the defect utilizing an interpolation axial flap and a staged reconstruction.  A telfa template was made of the defect.  This telfa template was then used to outline the posterior auricular interpolation flap.  The donor area for the pedicle flap was then injected with anesthesia.  The flap was excised through the skin and subcutaneous tissue down to the layer of the underlying musculature.  The pedicle flap was carefully excised within this deep plane to maintain its blood supply.  The edges of the donor site were undermined.   The donor site was closed in a primary fashion.  The pedicle was then rotated into position and sutured.  Once the tube was sutured into place, adequate blood supply was confirmed with blanching and refill.  The pedicle was then wrapped with xeroform gauze and dressed appropriately with a telfa and gauze bandage to ensure continued blood supply and protect the attached pedicle.
Paramedian Forehead Flap Text: A decision was made to reconstruct the defect utilizing an interpolation axial flap and a staged reconstruction.  A telfa template was made of the defect.  This telfa template was then used to outline the paramedian forehead pedicle flap.  The donor area for the pedicle flap was then injected with anesthesia.  The flap was excised through the skin and subcutaneous tissue down to the layer of the underlying musculature.  The pedicle flap was carefully excised within this deep plane to maintain its blood supply.  The edges of the donor site were undermined.   The donor site was closed in a primary fashion.  The pedicle was then rotated into position and sutured.  Once the tube was sutured into place, adequate blood supply was confirmed with blanching and refill.  The pedicle was then wrapped with xeroform gauze and dressed appropriately with a telfa and gauze bandage to ensure continued blood supply and protect the attached pedicle.
Cheiloplasty (Less Than 50%) Text: A decision was made to reconstruct the defect with a  cheiloplasty.  The defect was undermined extensively.  Additional obicularis oris muscle was excised with a 15 blade scalpel.  The defect was converted into a full thickness wedge, of less than 50% of the vertical height of the lip, to facilite a better cosmetic result.  Small vessels were then tied off with 5-0 monocyrl. The obicularis oris, superficial fascia, adipose and dermis were then reapproximated.  After the deeper layers were approximated the epidermis was reapproximated with particular care given to realign the vermilion border.
Cheiloplasty (Complex) Text: A decision was made to reconstruct the defect with a  cheiloplasty.  The defect was undermined extensively.  Additional obicularis oris muscle was excised with a 15 blade scalpel.  The defect was converted into a full thickness wedge to facilite a better cosmetic result.  Small vessels were then tied off with 5-0 monocyrl. The obicularis oris, superficial fascia, adipose and dermis were then reapproximated.  After the deeper layers were approximated the epidermis was reapproximated with particular care given to realign the vermilion border.
Ear Wedge Repair Text: A wedge excision was completed by carrying down an excision through the full thickness of the ear and cartilage with an inward facing Burow's triangle. The wound was then closed in a layered fashion.
Full Thickness Lip Wedge Repair (Flap) Text: Given the location of the defect and the proximity to free margins a full thickness wedge repair was deemed most appropriate.  Using a sterile surgical marker, the appropriate repair was drawn incorporating the defect and placing the expected incisions perpendicular to the vermilion border.  The vermilion border was also meticulously outlined to ensure appropriate reapproximation during the repair.  The area thus outlined was incised through and through with a #15 scalpel blade.  The muscularis and dermis were reaproximated with deep sutures following hemostasis. Care was taken to realign the vermilion border before proceeding with the superficial closure.  Once the vermilion was realigned the superfical and mucosal closure was finished.
Ftsg Text: The defect edges were debeveled with a #15 scalpel blade.  Given the location of the defect, shape of the defect and the proximity to free margins a full thickness skin graft was deemed most appropriate.  Using a sterile surgical marker, the primary defect shape was transferred to the donor site. The area thus outlined was incised deep to adipose tissue with a #15 scalpel blade.  The harvested graft was then trimmed of adipose tissue until only dermis and epidermis was left.  The skin margins of the secondary defect were undermined to an appropriate distance in all directions utilizing iris scissors.  The secondary defect was closed with interrupted buried subcutaneous sutures.  The skin edges were then re-apposed with running  sutures.  The skin graft was then placed in the primary defect and oriented appropriately.
Split-Thickness Skin Graft Text: The defect edges were debeveled with a #15 scalpel blade.  Given the location of the defect, shape of the defect and the proximity to free margins a split thickness skin graft was deemed most appropriate.  Using a sterile surgical marker, the primary defect shape was transferred to the donor site. The split thickness graft was then harvested.  The skin graft was then placed in the primary defect and oriented appropriately.
Burow's Graft Text: The defect edges were debeveled with a #15 scalpel blade.  Given the location of the defect, shape of the defect, the proximity to free margins and the presence of a standing cone deformity a Burow's skin graft was deemed most appropriate. The standing cone was removed and this tissue was then trimmed to the shape of the primary defect. The adipose tissue was also removed until only dermis and epidermis were left.  The skin margins of the secondary defect were undermined to an appropriate distance in all directions utilizing iris scissors.  The secondary defect was closed with interrupted buried subcutaneous sutures.  The skin edges were then re-apposed with running  sutures.  The skin graft was then placed in the primary defect and oriented appropriately.
Cartilage Graft Text: The defect edges were debeveled with a #15 scalpel blade.  Given the location of the defect, shape of the defect, the fact the defect involved a full thickness cartilage defect a cartilage graft was deemed most appropriate.  An appropriate donor site was identified, cleansed, and anesthetized. The cartilage graft was then harvested and transferred to the recipient site, oriented appropriately and then sutured into place.  The secondary defect was then repaired using a primary closure.
Composite Graft Text: The defect edges were debeveled with a #15 scalpel blade.  Given the location of the defect, shape of the defect, the proximity to free margins and the fact the defect was full thickness a composite graft was deemed most appropriate.  The defect was outline and then transferred to the donor site.  A full thickness graft was then excised from the donor site. The graft was then placed in the primary defect, oriented appropriately and then sutured into place.  The secondary defect was then repaired using a primary closure.
Epidermal Autograft Text: The defect edges were debeveled with a #15 scalpel blade.  Given the location of the defect, shape of the defect and the proximity to free margins an epidermal autograft was deemed most appropriate.  Using a sterile surgical marker, the primary defect shape was transferred to the donor site. The epidermal graft was then harvested.  The skin graft was then placed in the primary defect and oriented appropriately.
Dermal Autograft Text: The defect edges were debeveled with a #15 scalpel blade.  Given the location of the defect, shape of the defect and the proximity to free margins a dermal autograft was deemed most appropriate.  Using a sterile surgical marker, the primary defect shape was transferred to the donor site. The area thus outlined was incised deep to adipose tissue with a #15 scalpel blade.  The harvested graft was then trimmed of adipose and epidermal tissue until only dermis was left.  The skin graft was then placed in the primary defect and oriented appropriately.
Skin Substitute Text: The defect edges were debeveled with a #15 scalpel blade.  Given the location of the defect, shape of the defect and the proximity to free margins a skin substitute graft was deemed most appropriate.  The graft material was trimmed to fit the size of the defect. The graft was then placed in the primary defect and oriented appropriately.
Tissue Cultured Epidermal Autograft Text: The defect edges were debeveled with a #15 scalpel blade.  Given the location of the defect, shape of the defect and the proximity to free margins a tissue cultured epidermal autograft was deemed most appropriate.  The graft was then trimmed to fit the size of the defect.  The graft was then placed in the primary defect and oriented appropriately.
Xenograft Text: The defect edges were debeveled with a #15 scalpel blade.  Given the location of the defect, shape of the defect and the proximity to free margins a xenograft was deemed most appropriate.  The graft was then trimmed to fit the size of the defect.  The graft was then placed in the primary defect and oriented appropriately.
Purse String (Simple) Text: Given the location of the defect and the characteristics of the surrounding skin a purse string closure was deemed most appropriate.  Undermining was performed circumfirentially around the surgical defect.  A purse string suture was then placed and tightened.
Purse String (Intermediate) Text: Given the location of the defect and the characteristics of the surrounding skin a purse string intermediate closure was deemed most appropriate.  Undermining was performed circumfirentially around the surgical defect.  A purse string suture was then placed and tightened.
Partial Purse String (Simple) Text: Given the location of the defect and the characteristics of the surrounding skin a simple purse string closure was deemed most appropriate.  Undermining was performed circumfirentially around the surgical defect.  A purse string suture was then placed and tightened. Wound tension only allowed a partial closure of the circular defect.
Partial Purse String (Intermediate) Text: Given the location of the defect and the characteristics of the surrounding skin an intermediate purse string closure was deemed most appropriate.  Undermining was performed circumfirentially around the surgical defect.  A purse string suture was then placed and tightened. Wound tension only allowed a partial closure of the circular defect.
Localized Dermabrasion With Wire Brush Text: The patient was draped in routine manner.  Localized dermabrasion using 3 x 17 mm wire brush was performed in routine manner to papillary dermis. This spot dermabrasion is being performed to complete skin cancer reconstruction. It also will eliminate the other sun damaged precancerous cells that are known to be part of the regional effect of a lifetime's worth of sun exposure. This localized dermabrasion is therapeutic and should not be considered cosmetic in any regard.
Tarsorrhaphy Text: A tarsorrhaphy was performed using Frost sutures.
Complex Repair And Flap Additional Text (Will Appearing After The Standard Complex Repair Text): The complex repair was not sufficient to completely close the primary defect. The remaining additional defect was repaired with the flap mentioned below.
Complex Repair And Graft Additional Text (Will Appearing After The Standard Complex Repair Text): The complex repair was not sufficient to completely close the primary defect. The remaining additional defect was repaired with the graft mentioned below.
Manual Repair Warning Statement: We plan on removing the manually selected variable below in favor of our much easier automatic structured text blocks found in the previous tab. We decided to do this to help make the flow better and give you the full power of structured data. Manual selection is never going to be ideal in our platform and I would encourage you to avoid using manual selection from this point on, especially since I will be sunsetting this feature. It is important that you do one of two things with the customized text below. First, you can save all of the text in a word file so you can have it for future reference. Second, transfer the text to the appropriate area in the Library tab. Lastly, if there is a flap or graft type which we do not have you need to let us know right away so I can add it in before the variable is hidden. No need to panic, we plan to give you roughly 6 months to make the change.
Same Histology In Subsequent Stages Text: The pattern and morphology of the tumor is as described in the first stage.
No Residual Tumor Seen Histology Text: There were no malignant cells seen in the sections examined.
Inflammation Suggestive Of Cancer Camouflage Histology Text: There was a dense lymphocytic infiltrate which prevented adequate histologic evaluation of adjacent structures.
Bcc Histology Text: There were numerous aggregates of basaloid cells.
Bcc Infiltrative Histology Text: There were numerous aggregates of basaloid cells demonstrating an infiltrative pattern.
Mart-1 - Positive Histology Text: MART-1 staining demonstrates areas of higher density and clustering of melanocytes with Pagetoid spread upwards within the epidermis. The surgical margins are positive for tumor cells.
Mart-1 - Negative Histology Text: MART-1 staining demonstrates a normal density and pattern of melanocytes along the dermal-epidermal junction. The surgical margins are negative for tumor cells.
Information: Selecting Yes will display possible errors in your note based on the variables you have selected. This validation is only offered as a suggestion for you. PLEASE NOTE THAT THE VALIDATION TEXT WILL BE REMOVED WHEN YOU FINALIZE YOUR NOTE. IF YOU WANT TO FAX A PRELIMINARY NOTE YOU WILL NEED TO TOGGLE THIS TO 'NO' IF YOU DO NOT WANT IT IN YOUR FAXED NOTE.

## 2022-04-07 ENCOUNTER — APPOINTMENT (RX ONLY)
Dept: URBAN - METROPOLITAN AREA CLINIC 22 | Facility: CLINIC | Age: 62
Setting detail: DERMATOLOGY
End: 2022-04-07

## 2022-04-07 PROBLEM — C44.219 BASAL CELL CARCINOMA OF SKIN OF LEFT EAR AND EXTERNAL AURICULAR CANAL: Status: ACTIVE | Noted: 2022-04-07

## 2022-04-07 PROBLEM — C44.41 BASAL CELL CARCINOMA OF SKIN OF SCALP AND NECK: Status: ACTIVE | Noted: 2022-04-07

## 2022-04-07 PROCEDURE — 13132 CMPLX RPR F/C/C/M/N/AX/G/H/F: CPT

## 2022-04-07 PROCEDURE — 13133 CMPLX RPR F/C/C/M/N/AX/G/H/F: CPT

## 2022-04-07 PROCEDURE — ? MOHS SURGERY

## 2022-04-07 PROCEDURE — 17311 MOHS 1 STAGE H/N/HF/G: CPT | Mod: 76

## 2022-04-07 PROCEDURE — ? REFERRAL CORRESPONDENCE

## 2022-04-07 PROCEDURE — 17311 MOHS 1 STAGE H/N/HF/G: CPT

## 2022-04-07 PROCEDURE — 17312 MOHS ADDL STAGE: CPT

## 2022-04-07 PROCEDURE — ? PRESCRIPTION

## 2022-04-07 RX ADMIN — HYDROCODONE BITARTRATE AND ACETAMINOPHEN 1: 5; 325 TABLET ORAL at 00:00

## 2022-04-07 NOTE — PROCEDURE: MOHS SURGERY
Mohs Case Number: [id number]
Previous Accession (Optional): [id number]
Biopsy Photograph Reviewed: No (no photograph available)
Referring Physician (Optional): Amy Schoening, PA
Consent Type: Consent 1 (Standard)
Eye Shield Used: No
Surgeon Performing Repair (Optional): Luther Maciel M.D.
Initial Size Of Lesion: 2
Number Of Stages: 4
Primary Defect Length In Cm (Final Defect Size - Required For Flaps/Grafts): 7.5
Primary Defect Width In Cm (Final Defect Size - Required For Flaps/Grafts): 4.9
Repair Type: Complex Repair
Oculoplastic Surgeon Procedure Text (A): After obtaining clear surgical margins the patient was sent to oculoplastics for surgical repair.  The patient understands they will receive post-surgical care and follow-up from the referring physician's office.
Otolaryngologist Procedure Text (A): After obtaining clear surgical margins the patient was sent to otolaryngology for surgical repair.  The patient understands they will receive post-surgical care and follow-up from the referring physician's office.
Plastic Surgeon Procedure Text (A): After obtaining clear surgical margins the patient was sent to plastics for surgical repair.  The patient understands they will receive post-surgical care and follow-up from the referring physician's office.
Mid-Level Procedure Text (A): After obtaining clear surgical margins the patient was sent to a mid-level provider for surgical repair.  The patient understands they will receive post-surgical care and follow-up from the mid-level provider.
Provider Procedure Text (A): After obtaining clear surgical margins the defect was repaired by another provider.
Asc Procedure Text (A): After obtaining clear surgical margins the patient was sent to an ASC for surgical repair.  The patient understands they will receive post-surgical care and follow-up from the ASC physician.
Simple / Intermediate / Complex Repair - Final Wound Length In Cm: 13
Suturegard Retention Suture: 2-0 Nylon
Retention Suture Bite Size: 3 mm
Length To Time In Minutes Device Was In Place: 10
Number Of Hemigard Strips Per Side: 1
Width Of Defect Perpendicular To Closure In Cm (Required): 5.3
Distance Of Undermining In Cm (Required): 2.6
Undermining Type: Entire Wound
Debridement Text: The wound edges were debrided prior to proceeding with the closure to facilitate wound healing.
Helical Rim Text: The closure involved the helical rim.
Vermilion Border Text: The closure involved the vermilion border.
Nostril Rim Text: The closure involved the nostril rim.
Complex Requirements: Retention Sutures?: Yes
Retention Suture Text: Retention sutures were placed to support the closure and prevent dehiscence.
Secondary Defect Length In Cm (Required For Flaps): 0
Area H Indication Text: Tumors in this location are included in Area H (eyelids, eyebrows, nose, lips, chin, ear, pre-auricular, post-auricular, temple, genitalia, hands, feet, ankles and areola).  Tissue conservation is critical in these anatomic locations.
Area M Indication Text: Tumors in this location are included in Area M (cheek, forehead, scalp, neck, jawline and pretibial skin).  Mohs surgery is indicated for tumors in these anatomic locations.
Area L Indication Text: Tumors in this location are included in Area L (trunk and extremities).  Mohs surgery is indicated for larger tumors, or tumors with aggressive histologic features, in these anatomic locations.
Tumor Debulked?: curette
Depth Of Tumor Invasion (For Histology): dermis
Surgical Defect Length In Cm (Optional): 2.5
Surgical Defect Width In Cm (Optional): 2.3
Special Stains Stage 1 - Results: Base On Clearance Noted Above
Stage 2: Additional Anesthesia Volume In Cc: 3
Stage 2: Additional Anesthesia Type: 1% lidocaine with epinephrine and a 1:10 solution of 8.4% sodium bicarbonate
Surgical Defect Length In Cm (Optional): 4.0
Surgical Defect Width In Cm (Optional): 3.5
Surgical Defect Width In Cm (Optional): 3.8
Include Tumor Staging In Mohs Note?: Please Select the Appropriate Response
Staging Info: By selecting yes to the question above you will include information on AJCC 8 tumor staging in your Mohs note. Information on tumor staging will be automatically added for SCCs on the head and neck. AJCC 8 includes tumor size, tumor depth, perineural involvement and bone invasion.
Tumor Depth: Less than 6mm from granular layer and no invasion beyond the subcutaneous fat
Medical Necessity Statement: Based on my medical judgement, Mohs surgery is the most appropriate treatment for this cancer compared to other treatments.
Alternatives Discussed Intro (Do Not Add Period): I discussed alternative treatments to Mohs surgery and specifically discussed the risks and benefits of
Consent 1/Introductory Paragraph: The rationale for Mohs was explained to the patient and consent was obtained. The risks, benefits and alternatives to therapy were discussed in detail. Specifically, the risks of infection, scarring, bleeding, prolonged wound healing, incomplete removal, allergy to anesthesia, nerve injury and recurrence were addressed. Prior to the procedure, the treatment site was clearly identified and confirmed by the patient. All components of Universal Protocol/PAUSE Rule completed.
Consent 2/Introductory Paragraph: Mohs surgery was explained to the patient and consent was obtained. The risks, benefits and alternatives to therapy were discussed in detail. Specifically, the risks of infection, scarring, bleeding, prolonged wound healing, incomplete removal, allergy to anesthesia, nerve injury and recurrence were addressed. Prior to the procedure, the treatment site was clearly identified and confirmed by the patient. All components of Universal Protocol/PAUSE Rule completed.
Consent 3/Introductory Paragraph: I gave the patient a chance to ask questions they had about the procedure.  Following this I explained the Mohs procedure and consent was obtained. The risks, benefits and alternatives to therapy were discussed in detail. Specifically, the risks of infection, scarring, bleeding, prolonged wound healing, incomplete removal, allergy to anesthesia, nerve injury and recurrence were addressed. Prior to the procedure, the treatment site was clearly identified and confirmed by the patient. All components of Universal Protocol/PAUSE Rule completed.
Consent (Temporal Branch)/Introductory Paragraph: The rationale for Mohs was explained to the patient and consent was obtained. The risks, benefits and alternatives to therapy were discussed in detail. Specifically, the risks of damage to the temporal branch of the facial nerve, infection, scarring, bleeding, prolonged wound healing, incomplete removal, allergy to anesthesia, and recurrence were addressed. Prior to the procedure, the treatment site was clearly identified and confirmed by the patient. All components of Universal Protocol/PAUSE Rule completed.
Consent (Marginal Mandibular)/Introductory Paragraph: The rationale for Mohs was explained to the patient and consent was obtained. The risks, benefits and alternatives to therapy were discussed in detail. Specifically, the risks of damage to the marginal mandibular branch of the facial nerve, infection, scarring, bleeding, prolonged wound healing, incomplete removal, allergy to anesthesia, and recurrence were addressed. Prior to the procedure, the treatment site was clearly identified and confirmed by the patient. All components of Universal Protocol/PAUSE Rule completed.
Consent (Spinal Accessory)/Introductory Paragraph: The rationale for Mohs was explained to the patient and consent was obtained. The risks, benefits and alternatives to therapy were discussed in detail. Specifically, the risks of damage to the spinal accessory nerve, infection, scarring, bleeding, prolonged wound healing, incomplete removal, allergy to anesthesia, and recurrence were addressed. Prior to the procedure, the treatment site was clearly identified and confirmed by the patient. All components of Universal Protocol/PAUSE Rule completed.
Consent (Near Eyelid Margin)/Introductory Paragraph: The rationale for Mohs was explained to the patient and consent was obtained. The risks, benefits and alternatives to therapy were discussed in detail. Specifically, the risks of ectropion or eyelid deformity, infection, scarring, bleeding, prolonged wound healing, incomplete removal, allergy to anesthesia, nerve injury and recurrence were addressed. Prior to the procedure, the treatment site was clearly identified and confirmed by the patient. All components of Universal Protocol/PAUSE Rule completed.
Consent (Ear)/Introductory Paragraph: The rationale for Mohs was explained to the patient and consent was obtained. The risks, benefits and alternatives to therapy were discussed in detail. Specifically, the risks of ear deformity, infection, scarring, bleeding, prolonged wound healing, incomplete removal, allergy to anesthesia, nerve injury and recurrence were addressed. Prior to the procedure, the treatment site was clearly identified and confirmed by the patient. All components of Universal Protocol/PAUSE Rule completed.
Consent (Nose)/Introductory Paragraph: The rationale for Mohs was explained to the patient and consent was obtained. The risks, benefits and alternatives to therapy were discussed in detail. Specifically, the risks of nasal deformity, changes in the flow of air through the nose, infection, scarring, bleeding, prolonged wound healing, incomplete removal, allergy to anesthesia, nerve injury and recurrence were addressed. Prior to the procedure, the treatment site was clearly identified and confirmed by the patient. All components of Universal Protocol/PAUSE Rule completed.
Consent (Lip)/Introductory Paragraph: The rationale for Mohs was explained to the patient and consent was obtained. The risks, benefits and alternatives to therapy were discussed in detail. Specifically, the risks of lip deformity, changes in the oral aperture, infection, scarring, bleeding, prolonged wound healing, incomplete removal, allergy to anesthesia, nerve injury and recurrence were addressed. Prior to the procedure, the treatment site was clearly identified and confirmed by the patient. All components of Universal Protocol/PAUSE Rule completed.
Consent (Scalp)/Introductory Paragraph: The rationale for Mohs was explained to the patient and consent was obtained. The risks, benefits and alternatives to therapy were discussed in detail. Specifically, the risks of changes in hair growth pattern secondary to repair, infection, scarring, bleeding, prolonged wound healing, incomplete removal, allergy to anesthesia, nerve injury and recurrence were addressed. Prior to the procedure, the treatment site was clearly identified and confirmed by the patient. All components of Universal Protocol/PAUSE Rule completed.
Detail Level: Detailed
Postop Diagnosis: same
Anesthesia Volume In Cc: 41
Additional Anesthesia Volume In Cc: 6
Hemostasis: Electrodesiccation
Estimated Blood Loss (Cc): minimal
Stage 5: Additional Anesthesia Type: 1% lidocaine with epinephrine
Repair Anesthesia Method: local infiltration
Repair Anesthesia Type: 1% lidocaine with 1:100,000 epinephrine and a 1:10 solution of 8.4% sodium bicarbonate
Anesthesia Volume In Cc: 15
Brow Lift Text: A midfrontal incision was made medially to the defect to allow access to the tissues just superior to the left eyebrow. Following careful dissection inferiorly in a supraperiosteal plane to the level of the left eyebrow, several 3-0 monocryl sutures were used to resuspend the eyebrow orbicularis oculi muscular unit to the superior frontal bone periosteum. This resulted in an appropriate reapproximation of static eyebrow symmetry and correction of the left brow ptosis.
Deep Sutures: 3-0 Maxon
Epidermal Sutures: 4-0 Surgipro
Epidermal Closure: running and interrupted
Suturegard Intro: Intraoperative tissue expansion was performed, utilizing the SUTUREGARD device, in order to reduce wound tension.
Suturegard Body: The suture ends were repeatedly re-tightened and re-clamped to achieve the desired tissue expansion.
Hemigard Intro: Due to skin fragility and wound tension, it was decided to use HEMIGARD adhesive retention suture devices to permit a linear closure. The skin was cleaned and dried for a 6cm distance away from the wound. Excessive hair, if present, was removed to allow for adhesion.
Hemigard Postcare Instructions: The HEMIGARD strips are to remain completely dry for at least 5-7 days.
Donor Site Anesthesia Type: same as repair anesthesia
Epidermal Closure Graft Donor Site (Optional): simple interrupted
Graft Donor Site Bandage (Optional-Leave Blank If You Don't Want In Note): Steri-strips and a pressure bandage were applied to the donor site.
Closure 2 Information: This tab is for additional flaps and grafts, including complex repair and grafts and complex repair and flaps. You can also specify a different location for the additional defect, if the location is the same you do not need to select a new one. We will insert the automated text for the repair you select below just as we do for solitary flaps and grafts. Please note that at this time if you select a location with a different insurance zone you will need to override the ICD10 and CPT if appropriate.
Closure 3 Information: This tab is for additional flaps and grafts above and beyond our usual structured repairs.  Please note if you enter information here it will not currently bill and you will need to add the billing information manually.
Wound Care: Petrolatum
Dressing: pressure dressing with telfa
Dressing (No Sutures): dry sterile dressing
Suture Removal: 14 days
Wound Check: 7 days
Unna Boot Text: An Unna boot was placed to help immobilize the limb and facilitate more rapid healing.
Home Suture Removal Text: Patient was provided instructions on removing sutures and will remove their sutures at home.  If they have any questions or difficulties they will call the office.
Post-Care Instructions: I reviewed with the patient in detail post-care instructions. Patient is not to engage in any heavy lifting, exercise, or swimming for the next 14 days. Should the patient develop any fevers, chills, bleeding, severe pain patient will contact the office immediately.
Pain Refusal Text: I offered to prescribe pain medication but the patient refused to take this medication.
Mauc Instructions: By selecting yes to the question below the MAUC number will be added into the note.  This will be calculated automatically based on the diagnosis chosen, the size entered, the body zone selected (H,M,L) and the specific indications you chose. You will also have the option to override the Mohs AUC if you disagree with the automatically calculated number and this option is found in the Case Summary tab.
Where Do You Want The Question To Include Opioid Counseling Located?: Case Summary Tab
Eye Protection Verbiage: Before proceeding with the stage, a plastic scleral shield was inserted. The globe was anesthetized with a few drops of 1% lidocaine with 1:100,000 epinephrine. Then, an appropriate sized scleral shield was chosen and coated with lacrilube ointment. The shield was gently inserted and left in place for the duration of each stage. After the stage was completed, the shield was gently removed.
Mohs Method Verbiage: An incision at a 45 degree angle following the standard Mohs approach was done and the specimen was harvested as a microscopic controlled layer.
Surgeon/Pathologist Verbiage (Will Incorporate Name Of Surgeon From Intro If Not Blank): operated in two distinct and integrated capacities as the surgeon and pathologist.
Mohs Histo Method Verbiage: Each section was then chromacoded and processed in the Mohs lab using the Mohs protocol and submitted for frozen section.
Subsequent Stages Histo Method Verbiage: Using a similar technique to that described above, a thin layer of tissue was removed from all areas where tumor was visible on the previous stage.  The tissue was again oriented, mapped, dyed, and processed as above.
Mohs Rapid Report Verbiage: The area of clinically evident tumor was marked with skin marking ink and appropriately hatched.  The initial incision was made following the Mohs approach through the skin.  The specimen was taken to the lab, divided into the necessary number of pieces, chromacoded and processed according to the Mohs protocol.  This was repeated in successive stages until a tumor free defect was achieved.
Complex Repair Preamble Text (Leave Blank If You Do Not Want): Extensive wide undermining was performed.
Intermediate Repair Preamble Text (Leave Blank If You Do Not Want): Undermining was performed with blunt dissection.
Non-Graft Cartilage Fenestration Text: The cartilage was fenestrated with a 2mm punch biopsy to help facilitate healing.
Graft Cartilage Fenestration Text: The cartilage was fenestrated with a 2mm punch biopsy to help facilitate graft survival and healing.
Secondary Intention Text (Leave Blank If You Do Not Want): The defect will heal with secondary intention.
No Repair - Repaired With Adjacent Surgical Defect Text (Leave Blank If You Do Not Want): After obtaining clear surgical margins the defect was repaired concurrently with another surgical defect which was in close approximation.
Adjacent Tissue Transfer Text: The defect edges were debeveled with a #15 scalpel blade.  Given the location of the defect and the proximity to free margins an adjacent tissue transfer was deemed most appropriate.  Using a sterile surgical marker, an appropriate flap was drawn incorporating the defect and placing the expected incisions within the relaxed skin tension lines where possible.    The area thus outlined was incised deep to adipose tissue with a #15 scalpel blade.  The skin margins were undermined to an appropriate distance in all directions utilizing iris scissors.
Advancement Flap (Single) Text: The defect edges were debeveled with a #15 scalpel blade.  Given the location of the defect and the proximity to free margins a single advancement flap was deemed most appropriate.  Using a sterile surgical marker, an appropriate advancement flap was drawn incorporating the defect and placing the expected incisions within the relaxed skin tension lines where possible.    The area thus outlined was incised deep to adipose tissue with a #15 scalpel blade.  The skin margins were undermined to an appropriate distance in all directions utilizing iris scissors.
Advancement Flap (Double) Text: The defect edges were debeveled with a #15 scalpel blade.  Given the location of the defect and the proximity to free margins a double advancement flap was deemed most appropriate.  Using a sterile surgical marker, the appropriate advancement flaps were drawn incorporating the defect and placing the expected incisions within the relaxed skin tension lines where possible.    The area thus outlined was incised deep to adipose tissue with a #15 scalpel blade.  The skin margins were undermined to an appropriate distance in all directions utilizing iris scissors.
Burow's Advancement Flap Text: The defect edges were debeveled with a #15 scalpel blade.  Given the location of the defect and the proximity to free margins a Burow's advancement flap was deemed most appropriate.  Using a sterile surgical marker, the appropriate advancement flap was drawn incorporating the defect and placing the expected incisions within the relaxed skin tension lines where possible.    The area thus outlined was incised deep to adipose tissue with a #15 scalpel blade.  The skin margins were undermined to an appropriate distance in all directions utilizing iris scissors.
Chonodrocutaneous Helical Advancement Flap Text: The defect edges were debeveled with a #15 scalpel blade.  Given the location of the defect and the proximity to free margins a chondrocutaneous helical advancement flap was deemed most appropriate.  Using a sterile surgical marker, the appropriate advancement flap was drawn incorporating the defect and placing the expected incisions within the relaxed skin tension lines where possible.    The area thus outlined was incised deep to adipose tissue with a #15 scalpel blade.  The skin margins were undermined to an appropriate distance in all directions utilizing iris scissors.
Crescentic Advancement Flap Text: The defect edges were debeveled with a #15 scalpel blade.  Given the location of the defect and the proximity to free margins a crescentic advancement flap was deemed most appropriate.  Using a sterile surgical marker, the appropriate advancement flap was drawn incorporating the defect and placing the expected incisions within the relaxed skin tension lines where possible.    The area thus outlined was incised deep to adipose tissue with a #15 scalpel blade.  The skin margins were undermined to an appropriate distance in all directions utilizing iris scissors.
A-T Advancement Flap Text: The defect edges were debeveled with a #15 scalpel blade.  Given the location of the defect, shape of the defect and the proximity to free margins an A-T advancement flap was deemed most appropriate.  Using a sterile surgical marker, an appropriate advancement flap was drawn incorporating the defect and placing the expected incisions within the relaxed skin tension lines where possible.    The area thus outlined was incised deep to adipose tissue with a #15 scalpel blade.  The skin margins were undermined to an appropriate distance in all directions utilizing iris scissors.
O-T Advancement Flap Text: The defect edges were debeveled with a #15 scalpel blade.  Given the location of the defect, shape of the defect and the proximity to free margins an O-T advancement flap was deemed most appropriate.  Using a sterile surgical marker, an appropriate advancement flap was drawn incorporating the defect and placing the expected incisions within the relaxed skin tension lines where possible.    The area thus outlined was incised deep to adipose tissue with a #15 scalpel blade.  The skin margins were undermined to an appropriate distance in all directions utilizing iris scissors.
O-L Flap Text: The defect edges were debeveled with a #15 scalpel blade.  Given the location of the defect, shape of the defect and the proximity to free margins an O-L flap was deemed most appropriate.  Using a sterile surgical marker, an appropriate advancement flap was drawn incorporating the defect and placing the expected incisions within the relaxed skin tension lines where possible.    The area thus outlined was incised deep to adipose tissue with a #15 scalpel blade.  The skin margins were undermined to an appropriate distance in all directions utilizing iris scissors.
O-Z Flap Text: The defect edges were debeveled with a #15 scalpel blade.  Given the location of the defect, shape of the defect and the proximity to free margins an O-Z flap was deemed most appropriate.  Using a sterile surgical marker, an appropriate transposition flap was drawn incorporating the defect and placing the expected incisions within the relaxed skin tension lines where possible. The area thus outlined was incised deep to adipose tissue with a #15 scalpel blade.  The skin margins were undermined to an appropriate distance in all directions utilizing iris scissors.
Double O-Z Flap Text: The defect edges were debeveled with a #15 scalpel blade.  Given the location of the defect, shape of the defect and the proximity to free margins a Double O-Z flap was deemed most appropriate.  Using a sterile surgical marker, an appropriate transposition flap was drawn incorporating the defect and placing the expected incisions within the relaxed skin tension lines where possible. The area thus outlined was incised deep to adipose tissue with a #15 scalpel blade.  The skin margins were undermined to an appropriate distance in all directions utilizing iris scissors.
V-Y Flap Text: The defect edges were debeveled with a #15 scalpel blade.  Given the location of the defect, shape of the defect and the proximity to free margins a V-Y flap was deemed most appropriate.  Using a sterile surgical marker, an appropriate advancement flap was drawn incorporating the defect and placing the expected incisions within the relaxed skin tension lines where possible.    The area thus outlined was incised deep to adipose tissue with a #15 scalpel blade.  The skin margins were undermined to an appropriate distance in all directions utilizing iris scissors.
Advancement-Rotation Flap Text: The defect edges were debeveled with a #15 scalpel blade.  Given the location of the defect, shape of the defect and the proximity to free margins an advancement-rotation flap was deemed most appropriate.  Using a sterile surgical marker, an appropriate flap was drawn incorporating the defect and placing the expected incisions within the relaxed skin tension lines where possible. The area thus outlined was incised deep to adipose tissue with a #15 scalpel blade.  The skin margins were undermined to an appropriate distance in all directions utilizing iris scissors.
Mercedes Flap Text: The defect edges were debeveled with a #15 scalpel blade.  Given the location of the defect, shape of the defect and the proximity to free margins a Mercedes flap was deemed most appropriate.  Using a sterile surgical marker, an appropriate advancement flap was drawn incorporating the defect and placing the expected incisions within the relaxed skin tension lines where possible. The area thus outlined was incised deep to adipose tissue with a #15 scalpel blade.  The skin margins were undermined to an appropriate distance in all directions utilizing iris scissors.
Modified Advancement Flap Text: The defect edges were debeveled with a #15 scalpel blade.  Given the location of the defect, shape of the defect and the proximity to free margins a modified advancement flap was deemed most appropriate.  Using a sterile surgical marker, an appropriate advancement flap was drawn incorporating the defect and placing the expected incisions within the relaxed skin tension lines where possible.    The area thus outlined was incised deep to adipose tissue with a #15 scalpel blade.  The skin margins were undermined to an appropriate distance in all directions utilizing iris scissors.
Mucosal Advancement Flap Text: Given the location of the defect, shape of the defect and the proximity to free margins a mucosal advancement flap was deemed most appropriate. Incisions were made with a 15 blade scalpel in the appropriate fashion along the cutaneous vermilion border and the mucosal lip. The remaining actinically damaged mucosal tissue was excised.  The mucosal advancement flap was then elevated to the gingival sulcus with care taken to preserve the neurovascular structures and advanced into the primary defect. Care was taken to ensure that precise realignment of the vermilion border was achieved.
Peng Advancement Flap Text: The defect edges were debeveled with a #15 scalpel blade.  Given the location of the defect, shape of the defect and the proximity to free margins a Peng advancement flap was deemed most appropriate.  Using a sterile surgical marker, an appropriate advancement flap was drawn incorporating the defect and placing the expected incisions within the relaxed skin tension lines where possible. The area thus outlined was incised deep to adipose tissue with a #15 scalpel blade.  The skin margins were undermined to an appropriate distance in all directions utilizing iris scissors.
Hatchet Flap Text: The defect edges were debeveled with a #15 scalpel blade.  Given the location of the defect, shape of the defect and the proximity to free margins a hatchet flap was deemed most appropriate.  Using a sterile surgical marker, an appropriate hatchet flap was drawn incorporating the defect and placing the expected incisions within the relaxed skin tension lines where possible.    The area thus outlined was incised deep to adipose tissue with a #15 scalpel blade.  The skin margins were undermined to an appropriate distance in all directions utilizing iris scissors.
Rotation Flap Text: The defect edges were debeveled with a #15 scalpel blade.  Given the location of the defect, shape of the defect and the proximity to free margins a rotation flap was deemed most appropriate.  Using a sterile surgical marker, an appropriate rotation flap was drawn incorporating the defect and placing the expected incisions within the relaxed skin tension lines where possible.    The area thus outlined was incised deep to adipose tissue with a #15 scalpel blade.  The skin margins were undermined to an appropriate distance in all directions utilizing iris scissors.
Spiral Flap Text: The defect edges were debeveled with a #15 scalpel blade.  Given the location of the defect, shape of the defect and the proximity to free margins a spiral flap was deemed most appropriate.  Using a sterile surgical marker, an appropriate rotation flap was drawn incorporating the defect and placing the expected incisions within the relaxed skin tension lines where possible. The area thus outlined was incised deep to adipose tissue with a #15 scalpel blade.  The skin margins were undermined to an appropriate distance in all directions utilizing iris scissors.
Staged Advancement Flap Text: The defect edges were debeveled with a #15 scalpel blade.  Given the location of the defect, shape of the defect and the proximity to free margins a staged advancement flap was deemed most appropriate.  Using a sterile surgical marker, an appropriate advancement flap was drawn incorporating the defect and placing the expected incisions within the relaxed skin tension lines where possible. The area thus outlined was incised deep to adipose tissue with a #15 scalpel blade.  The skin margins were undermined to an appropriate distance in all directions utilizing iris scissors.
Star Wedge Flap Text: The defect edges were debeveled with a #15 scalpel blade.  Given the location of the defect, shape of the defect and the proximity to free margins a star wedge flap was deemed most appropriate.  Using a sterile surgical marker, an appropriate rotation flap was drawn incorporating the defect and placing the expected incisions within the relaxed skin tension lines where possible. The area thus outlined was incised deep to adipose tissue with a #15 scalpel blade.  The skin margins were undermined to an appropriate distance in all directions utilizing iris scissors.
Transposition Flap Text: The defect edges were debeveled with a #15 scalpel blade.  Given the location of the defect and the proximity to free margins a transposition flap was deemed most appropriate.  Using a sterile surgical marker, an appropriate transposition flap was drawn incorporating the defect.    The area thus outlined was incised deep to adipose tissue with a #15 scalpel blade.  The skin margins were undermined to an appropriate distance in all directions utilizing iris scissors.
Muscle Hinge Flap Text: The defect edges were debeveled with a #15 scalpel blade.  Given the size, depth and location of the defect and the proximity to free margins a muscle hinge flap was deemed most appropriate.  Using a sterile surgical marker, an appropriate hinge flap was drawn incorporating the defect. The area thus outlined was incised with a #15 scalpel blade.  The skin margins were undermined to an appropriate distance in all directions utilizing iris scissors.
Mustarde Flap Text: The defect edges were debeveled with a #15 scalpel blade.  Given the size, depth and location of the defect and the proximity to free margins a Mustarde flap was deemed most appropriate.  Using a sterile surgical marker, an appropriate flap was drawn incorporating the defect. The area thus outlined was incised with a #15 scalpel blade.  The skin margins were undermined to an appropriate distance in all directions utilizing iris scissors.
Nasal Turnover Hinge Flap Text: The defect edges were debeveled with a #15 scalpel blade.  Given the size, depth, location of the defect and the defect being full thickness a nasal turnover hinge flap was deemed most appropriate.  Using a sterile surgical marker, an appropriate hinge flap was drawn incorporating the defect. The area thus outlined was incised with a #15 scalpel blade. The flap was designed to recreate the nasal mucosal lining and the alar rim. The skin margins were undermined to an appropriate distance in all directions utilizing iris scissors.
Nasalis-Muscle-Based Myocutaneous Island Pedicle Flap Text: Using a #15 blade, an incision was made around the donor flap to the level of the nasalis muscle. Wide lateral undermining was then performed in both the subcutaneous plane above the nasalis muscle, and in a submuscular plane just above periosteum. This allowed the formation of a free nasalis muscle axial pedicle (based on the angular artery) which was still attached to the actual cutaneous flap, increasing its mobility and vascular viability. Hemostasis was obtained with pinpoint electrocoagulation. The flap was mobilized into position and the pivotal anchor points positioned and stabilized with buried interrupted sutures. Subcutaneous and dermal tissues were closed in a multilayered fashion with sutures. Tissue redundancies were excised, and the epidermal edges were apposed without significant tension and sutured with sutures.
Orbicularis Oris Muscle Flap Text: The defect edges were debeveled with a #15 scalpel blade.  Given that the defect affected the competency of the oral sphincter an orbicularis oris muscle flap was deemed most appropriate to restore this competency and normal muscle function.  Using a sterile surgical marker, an appropriate flap was drawn incorporating the defect. The area thus outlined was incised with a #15 scalpel blade.
Melolabial Transposition Flap Text: The defect edges were debeveled with a #15 scalpel blade.  Given the location of the defect and the proximity to free margins a melolabial flap was deemed most appropriate.  Using a sterile surgical marker, an appropriate melolabial transposition flap was drawn incorporating the defect.    The area thus outlined was incised deep to adipose tissue with a #15 scalpel blade.  The skin margins were undermined to an appropriate distance in all directions utilizing iris scissors.
Rhombic Flap Text: The defect edges were debeveled with a #15 scalpel blade.  Given the location of the defect and the proximity to free margins a rhombic flap was deemed most appropriate.  Using a sterile surgical marker, an appropriate rhombic flap was drawn incorporating the defect.    The area thus outlined was incised deep to adipose tissue with a #15 scalpel blade.  The skin margins were undermined to an appropriate distance in all directions utilizing iris scissors.
Rhomboid Transposition Flap Text: The defect edges were debeveled with a #15 scalpel blade.  Given the location of the defect and the proximity to free margins a rhomboid transposition flap was deemed most appropriate.  Using a sterile surgical marker, an appropriate rhomboid flap was drawn incorporating the defect.    The area thus outlined was incised deep to adipose tissue with a #15 scalpel blade.  The skin margins were undermined to an appropriate distance in all directions utilizing iris scissors.
Bi-Rhombic Flap Text: The defect edges were debeveled with a #15 scalpel blade.  Given the location of the defect and the proximity to free margins a bi-rhombic flap was deemed most appropriate.  Using a sterile surgical marker, an appropriate rhombic flap was drawn incorporating the defect. The area thus outlined was incised deep to adipose tissue with a #15 scalpel blade.  The skin margins were undermined to an appropriate distance in all directions utilizing iris scissors.
Helical Rim Advancement Flap Text: The defect edges were debeveled with a #15 blade scalpel.  Given the location of the defect and the proximity to free margins (helical rim) a double helical rim advancement flap was deemed most appropriate.  Using a sterile surgical marker, the appropriate advancement flaps were drawn incorporating the defect and placing the expected incisions between the helical rim and antihelix where possible.  The area thus outlined was incised through and through with a #15 scalpel blade.  With a skin hook and iris scissors, the flaps were gently and sharply undermined and freed up.
Bilateral Helical Rim Advancement Flap Text: The defect edges were debeveled with a #15 blade scalpel.  Given the location of the defect and the proximity to free margins (helical rim) a bilateral helical rim advancement flap was deemed most appropriate.  Using a sterile surgical marker, the appropriate advancement flaps were drawn incorporating the defect and placing the expected incisions between the helical rim and antihelix where possible.  The area thus outlined was incised through and through with a #15 scalpel blade.  With a skin hook and iris scissors, the flaps were gently and sharply undermined and freed up.
Ear Star Wedge Flap Text: The defect edges were debeveled with a #15 blade scalpel.  Given the location of the defect and the proximity to free margins (helical rim) an ear star wedge flap was deemed most appropriate.  Using a sterile surgical marker, the appropriate flap was drawn incorporating the defect and placing the expected incisions between the helical rim and antihelix where possible.  The area thus outlined was incised through and through with a #15 scalpel blade.
Banner Transposition Flap Text: The defect edges were debeveled with a #15 scalpel blade.  Given the location of the defect and the proximity to free margins a Banner transposition flap was deemed most appropriate.  Using a sterile surgical marker, an appropriate flap drawn around the defect. The area thus outlined was incised deep to adipose tissue with a #15 scalpel blade.  The skin margins were undermined to an appropriate distance in all directions utilizing iris scissors.
Bilobed Flap Text: The defect edges were debeveled with a #15 scalpel blade.  Given the location of the defect and the proximity to free margins a bilobe flap was deemed most appropriate.  Using a sterile surgical marker, an appropriate bilobe flap drawn around the defect.    The area thus outlined was incised deep to adipose tissue with a #15 scalpel blade.  The skin margins were undermined to an appropriate distance in all directions utilizing iris scissors.
Bilobed Transposition Flap Text: The defect edges were debeveled with a #15 scalpel blade.  Given the location of the defect and the proximity to free margins a bilobed transposition flap was deemed most appropriate.  Using a sterile surgical marker, an appropriate bilobe flap drawn around the defect.    The area thus outlined was incised deep to adipose tissue with a #15 scalpel blade.  The skin margins were undermined to an appropriate distance in all directions utilizing iris scissors.
Trilobed Flap Text: The defect edges were debeveled with a #15 scalpel blade.  Given the location of the defect and the proximity to free margins a trilobed flap was deemed most appropriate.  Using a sterile surgical marker, an appropriate trilobed flap drawn around the defect.    The area thus outlined was incised deep to adipose tissue with a #15 scalpel blade.  The skin margins were undermined to an appropriate distance in all directions utilizing iris scissors.
Dorsal Nasal Flap Text: The defect edges were debeveled with a #15 scalpel blade.  Given the location of the defect and the proximity to free margins a dorsal nasal flap was deemed most appropriate.  Using a sterile surgical marker, an appropriate dorsal nasal flap was drawn around the defect.    The area thus outlined was incised deep to adipose tissue with a #15 scalpel blade.  The skin margins were undermined to an appropriate distance in all directions utilizing iris scissors.
Island Pedicle Flap Text: The defect edges were debeveled with a #15 scalpel blade.  Given the location of the defect, shape of the defect and the proximity to free margins an island pedicle advancement flap was deemed most appropriate.  Using a sterile surgical marker, an appropriate advancement flap was drawn incorporating the defect, outlining the appropriate donor tissue and placing the expected incisions within the relaxed skin tension lines where possible.    The area thus outlined was incised deep to adipose tissue with a #15 scalpel blade.  The skin margins were undermined to an appropriate distance in all directions around the primary defect and laterally outward around the island pedicle utilizing iris scissors.  There was minimal undermining beneath the pedicle flap.
Island Pedicle Flap With Canthal Suspension Text: The defect edges were debeveled with a #15 scalpel blade.  Given the location of the defect, shape of the defect and the proximity to free margins an island pedicle advancement flap was deemed most appropriate.  Using a sterile surgical marker, an appropriate advancement flap was drawn incorporating the defect, outlining the appropriate donor tissue and placing the expected incisions within the relaxed skin tension lines where possible. The area thus outlined was incised deep to adipose tissue with a #15 scalpel blade.  The skin margins were undermined to an appropriate distance in all directions around the primary defect and laterally outward around the island pedicle utilizing iris scissors.  There was minimal undermining beneath the pedicle flap. A suspension suture was placed in the canthal tendon to prevent tension and prevent ectropion.
Alar Island Pedicle Flap Text: The defect edges were debeveled with a #15 scalpel blade.  Given the location of the defect, shape of the defect and the proximity to the alar rim an island pedicle advancement flap was deemed most appropriate.  Using a sterile surgical marker, an appropriate advancement flap was drawn incorporating the defect, outlining the appropriate donor tissue and placing the expected incisions within the nasal ala running parallel to the alar rim. The area thus outlined was incised with a #15 scalpel blade.  The skin margins were undermined minimally to an appropriate distance in all directions around the primary defect and laterally outward around the island pedicle utilizing iris scissors.  There was minimal undermining beneath the pedicle flap.
Double Island Pedicle Flap Text: The defect edges were debeveled with a #15 scalpel blade.  Given the location of the defect, shape of the defect and the proximity to free margins a double island pedicle advancement flap was deemed most appropriate.  Using a sterile surgical marker, an appropriate advancement flap was drawn incorporating the defect, outlining the appropriate donor tissue and placing the expected incisions within the relaxed skin tension lines where possible.    The area thus outlined was incised deep to adipose tissue with a #15 scalpel blade.  The skin margins were undermined to an appropriate distance in all directions around the primary defect and laterally outward around the island pedicle utilizing iris scissors.  There was minimal undermining beneath the pedicle flap.
Island Pedicle Flap-Requiring Vessel Identification Text: The defect edges were debeveled with a #15 scalpel blade.  Given the location of the defect, shape of the defect and the proximity to free margins an island pedicle advancement flap was deemed most appropriate.  Using a sterile surgical marker, an appropriate advancement flap was drawn, based on the axial vessel mentioned above, incorporating the defect, outlining the appropriate donor tissue and placing the expected incisions within the relaxed skin tension lines where possible.    The area thus outlined was incised deep to adipose tissue with a #15 scalpel blade.  The skin margins were undermined to an appropriate distance in all directions around the primary defect and laterally outward around the island pedicle utilizing iris scissors.  There was minimal undermining beneath the pedicle flap.
Keystone Flap Text: The defect edges were debeveled with a #15 scalpel blade.  Given the location of the defect, shape of the defect a keystone flap was deemed most appropriate.  Using a sterile surgical marker, an appropriate keystone flap was drawn incorporating the defect, outlining the appropriate donor tissue and placing the expected incisions within the relaxed skin tension lines where possible. The area thus outlined was incised deep to adipose tissue with a #15 scalpel blade.  The skin margins were undermined to an appropriate distance in all directions around the primary defect and laterally outward around the flap utilizing iris scissors.
O-T Plasty Text: The defect edges were debeveled with a #15 scalpel blade.  Given the location of the defect, shape of the defect and the proximity to free margins an O-T plasty was deemed most appropriate.  Using a sterile surgical marker, an appropriate O-T plasty was drawn incorporating the defect and placing the expected incisions within the relaxed skin tension lines where possible.    The area thus outlined was incised deep to adipose tissue with a #15 scalpel blade.  The skin margins were undermined to an appropriate distance in all directions utilizing iris scissors.
O-Z Plasty Text: The defect edges were debeveled with a #15 scalpel blade.  Given the location of the defect, shape of the defect and the proximity to free margins an O-Z plasty (double transposition flap) was deemed most appropriate.  Using a sterile surgical marker, the appropriate transposition flaps were drawn incorporating the defect and placing the expected incisions within the relaxed skin tension lines where possible.    The area thus outlined was incised deep to adipose tissue with a #15 scalpel blade.  The skin margins were undermined to an appropriate distance in all directions utilizing iris scissors.  Hemostasis was achieved with electrocautery.  The flaps were then transposed into place, one clockwise and the other counterclockwise, and anchored with interrupted buried subcutaneous sutures.
Double O-Z Plasty Text: The defect edges were debeveled with a #15 scalpel blade.  Given the location of the defect, shape of the defect and the proximity to free margins a Double O-Z plasty (double transposition flap) was deemed most appropriate.  Using a sterile surgical marker, the appropriate transposition flaps were drawn incorporating the defect and placing the expected incisions within the relaxed skin tension lines where possible. The area thus outlined was incised deep to adipose tissue with a #15 scalpel blade.  The skin margins were undermined to an appropriate distance in all directions utilizing iris scissors.  Hemostasis was achieved with electrocautery.  The flaps were then transposed into place, one clockwise and the other counterclockwise, and anchored with interrupted buried subcutaneous sutures.
V-Y Plasty Text: The defect edges were debeveled with a #15 scalpel blade.  Given the location of the defect, shape of the defect and the proximity to free margins an V-Y advancement flap was deemed most appropriate.  Using a sterile surgical marker, an appropriate advancement flap was drawn incorporating the defect and placing the expected incisions within the relaxed skin tension lines where possible.    The area thus outlined was incised deep to adipose tissue with a #15 scalpel blade.  The skin margins were undermined to an appropriate distance in all directions utilizing iris scissors.
H Plasty Text: Given the location of the defect, shape of the defect and the proximity to free margins a H-plasty was deemed most appropriate for repair.  Using a sterile surgical marker, the appropriate advancement arms of the H-plasty were drawn incorporating the defect and placing the expected incisions within the relaxed skin tension lines where possible. The area thus outlined was incised deep to adipose tissue with a #15 scalpel blade. The skin margins were undermined to an appropriate distance in all directions utilizing iris scissors.  The opposing advancement arms were then advanced into place in opposite direction and anchored with interrupted buried subcutaneous sutures.
W Plasty Text: The lesion was extirpated to the level of the fat with a #15 scalpel blade.  Given the location of the defect, shape of the defect and the proximity to free margins a W-plasty was deemed most appropriate for repair.  Using a sterile surgical marker, the appropriate transposition arms of the W-plasty were drawn incorporating the defect and placing the expected incisions within the relaxed skin tension lines where possible.    The area thus outlined was incised deep to adipose tissue with a #15 scalpel blade.  The skin margins were undermined to an appropriate distance in all directions utilizing iris scissors.  The opposing transposition arms were then transposed into place in opposite direction and anchored with interrupted buried subcutaneous sutures.
Z Plasty Text: The lesion was extirpated to the level of the fat with a #15 scalpel blade.  Given the location of the defect, shape of the defect and the proximity to free margins a Z-plasty was deemed most appropriate for repair.  Using a sterile surgical marker, the appropriate transposition arms of the Z-plasty were drawn incorporating the defect and placing the expected incisions within the relaxed skin tension lines where possible.    The area thus outlined was incised deep to adipose tissue with a #15 scalpel blade.  The skin margins were undermined to an appropriate distance in all directions utilizing iris scissors.  The opposing transposition arms were then transposed into place in opposite direction and anchored with interrupted buried subcutaneous sutures.
Zygomaticofacial Flap Text: Given the location of the defect, shape of the defect and the proximity to free margins a zygomaticofacial flap was deemed most appropriate for repair.  Using a sterile surgical marker, the appropriate flap was drawn incorporating the defect and placing the expected incisions within the relaxed skin tension lines where possible. The area thus outlined was incised deep to adipose tissue with a #15 scalpel blade with preservation of a vascular pedicle.  The skin margins were undermined to an appropriate distance in all directions utilizing iris scissors.  The flap was then placed into the defect and anchored with interrupted buried subcutaneous sutures.
Cheek Interpolation Flap Text: A decision was made to reconstruct the defect utilizing an interpolation axial flap and a staged reconstruction.  A telfa template was made of the defect.  This telfa template was then used to outline the Cheek Interpolation flap.  The donor area for the pedicle flap was then injected with anesthesia.  The flap was excised through the skin and subcutaneous tissue down to the layer of the underlying musculature.  The interpolation flap was carefully excised within this deep plane to maintain its blood supply.  The edges of the donor site were undermined.   The donor site was closed in a primary fashion.  The pedicle was then rotated into position and sutured.  Once the tube was sutured into place, adequate blood supply was confirmed with blanching and refill.  The pedicle was then wrapped with xeroform gauze and dressed appropriately with a telfa and gauze bandage to ensure continued blood supply and protect the attached pedicle.
Cheek-To-Nose Interpolation Flap Text: A decision was made to reconstruct the defect utilizing an interpolation axial flap and a staged reconstruction.  A telfa template was made of the defect.  This telfa template was then used to outline the Cheek-To-Nose Interpolation flap.  The donor area for the pedicle flap was then injected with anesthesia.  The flap was excised through the skin and subcutaneous tissue down to the layer of the underlying musculature.  The interpolation flap was carefully excised within this deep plane to maintain its blood supply.  The edges of the donor site were undermined.   The donor site was closed in a primary fashion.  The pedicle was then rotated into position and sutured.  Once the tube was sutured into place, adequate blood supply was confirmed with blanching and refill.  The pedicle was then wrapped with xeroform gauze and dressed appropriately with a telfa and gauze bandage to ensure continued blood supply and protect the attached pedicle.
Interpolation Flap Text: A decision was made to reconstruct the defect utilizing an interpolation axial flap and a staged reconstruction.  A telfa template was made of the defect.  This telfa template was then used to outline the interpolation flap.  The donor area for the pedicle flap was then injected with anesthesia.  The flap was excised through the skin and subcutaneous tissue down to the layer of the underlying musculature.  The interpolation flap was carefully excised within this deep plane to maintain its blood supply.  The edges of the donor site were undermined.   The donor site was closed in a primary fashion.  The pedicle was then rotated into position and sutured.  Once the tube was sutured into place, adequate blood supply was confirmed with blanching and refill.  The pedicle was then wrapped with xeroform gauze and dressed appropriately with a telfa and gauze bandage to ensure continued blood supply and protect the attached pedicle.
Melolabial Interpolation Flap Text: A decision was made to reconstruct the defect utilizing an interpolation axial flap and a staged reconstruction.  A telfa template was made of the defect.  This telfa template was then used to outline the melolabial interpolation flap.  The donor area for the pedicle flap was then injected with anesthesia.  The flap was excised through the skin and subcutaneous tissue down to the layer of the underlying musculature.  The pedicle flap was carefully excised within this deep plane to maintain its blood supply.  The edges of the donor site were undermined.   The donor site was closed in a primary fashion.  The pedicle was then rotated into position and sutured.  Once the tube was sutured into place, adequate blood supply was confirmed with blanching and refill.  The pedicle was then wrapped with xeroform gauze and dressed appropriately with a telfa and gauze bandage to ensure continued blood supply and protect the attached pedicle.
Mastoid Interpolation Flap Text: A decision was made to reconstruct the defect utilizing an interpolation axial flap and a staged reconstruction.  A telfa template was made of the defect.  This telfa template was then used to outline the mastoid interpolation flap.  The donor area for the pedicle flap was then injected with anesthesia.  The flap was excised through the skin and subcutaneous tissue down to the layer of the underlying musculature.  The pedicle flap was carefully excised within this deep plane to maintain its blood supply.  The edges of the donor site were undermined.   The donor site was closed in a primary fashion.  The pedicle was then rotated into position and sutured.  Once the tube was sutured into place, adequate blood supply was confirmed with blanching and refill.  The pedicle was then wrapped with xeroform gauze and dressed appropriately with a telfa and gauze bandage to ensure continued blood supply and protect the attached pedicle.
Posterior Auricular Interpolation Flap Text: A decision was made to reconstruct the defect utilizing an interpolation axial flap and a staged reconstruction.  A telfa template was made of the defect.  This telfa template was then used to outline the posterior auricular interpolation flap.  The donor area for the pedicle flap was then injected with anesthesia.  The flap was excised through the skin and subcutaneous tissue down to the layer of the underlying musculature.  The pedicle flap was carefully excised within this deep plane to maintain its blood supply.  The edges of the donor site were undermined.   The donor site was closed in a primary fashion.  The pedicle was then rotated into position and sutured.  Once the tube was sutured into place, adequate blood supply was confirmed with blanching and refill.  The pedicle was then wrapped with xeroform gauze and dressed appropriately with a telfa and gauze bandage to ensure continued blood supply and protect the attached pedicle.
Paramedian Forehead Flap Text: A decision was made to reconstruct the defect utilizing an interpolation axial flap and a staged reconstruction.  A telfa template was made of the defect.  This telfa template was then used to outline the paramedian forehead pedicle flap.  The donor area for the pedicle flap was then injected with anesthesia.  The flap was excised through the skin and subcutaneous tissue down to the layer of the underlying musculature.  The pedicle flap was carefully excised within this deep plane to maintain its blood supply.  The edges of the donor site were undermined.   The donor site was closed in a primary fashion.  The pedicle was then rotated into position and sutured.  Once the tube was sutured into place, adequate blood supply was confirmed with blanching and refill.  The pedicle was then wrapped with xeroform gauze and dressed appropriately with a telfa and gauze bandage to ensure continued blood supply and protect the attached pedicle.
Cheiloplasty (Less Than 50%) Text: A decision was made to reconstruct the defect with a  cheiloplasty.  The defect was undermined extensively.  Additional obicularis oris muscle was excised with a 15 blade scalpel.  The defect was converted into a full thickness wedge, of less than 50% of the vertical height of the lip, to facilite a better cosmetic result.  Small vessels were then tied off with 5-0 monocyrl. The obicularis oris, superficial fascia, adipose and dermis were then reapproximated.  After the deeper layers were approximated the epidermis was reapproximated with particular care given to realign the vermilion border.
Cheiloplasty (Complex) Text: A decision was made to reconstruct the defect with a  cheiloplasty.  The defect was undermined extensively.  Additional obicularis oris muscle was excised with a 15 blade scalpel.  The defect was converted into a full thickness wedge to facilite a better cosmetic result.  Small vessels were then tied off with 5-0 monocyrl. The obicularis oris, superficial fascia, adipose and dermis were then reapproximated.  After the deeper layers were approximated the epidermis was reapproximated with particular care given to realign the vermilion border.
Ear Wedge Repair Text: A wedge excision was completed by carrying down an excision through the full thickness of the ear and cartilage with an inward facing Burow's triangle. The wound was then closed in a layered fashion.
Full Thickness Lip Wedge Repair (Flap) Text: Given the location of the defect and the proximity to free margins a full thickness wedge repair was deemed most appropriate.  Using a sterile surgical marker, the appropriate repair was drawn incorporating the defect and placing the expected incisions perpendicular to the vermilion border.  The vermilion border was also meticulously outlined to ensure appropriate reapproximation during the repair.  The area thus outlined was incised through and through with a #15 scalpel blade.  The muscularis and dermis were reaproximated with deep sutures following hemostasis. Care was taken to realign the vermilion border before proceeding with the superficial closure.  Once the vermilion was realigned the superfical and mucosal closure was finished.
Ftsg Text: The defect edges were debeveled with a #15 scalpel blade.  Given the location of the defect, shape of the defect and the proximity to free margins a full thickness skin graft was deemed most appropriate.  Using a sterile surgical marker, the primary defect shape was transferred to the donor site. The area thus outlined was incised deep to adipose tissue with a #15 scalpel blade.  The harvested graft was then trimmed of adipose tissue until only dermis and epidermis was left.  The skin margins of the secondary defect were undermined to an appropriate distance in all directions utilizing iris scissors.  The secondary defect was closed with interrupted buried subcutaneous sutures.  The skin edges were then re-apposed with running  sutures.  The skin graft was then placed in the primary defect and oriented appropriately.
Split-Thickness Skin Graft Text: The defect edges were debeveled with a #15 scalpel blade.  Given the location of the defect, shape of the defect and the proximity to free margins a split thickness skin graft was deemed most appropriate.  Using a sterile surgical marker, the primary defect shape was transferred to the donor site. The split thickness graft was then harvested.  The skin graft was then placed in the primary defect and oriented appropriately.
Burow's Graft Text: The defect edges were debeveled with a #15 scalpel blade.  Given the location of the defect, shape of the defect, the proximity to free margins and the presence of a standing cone deformity a Burow's skin graft was deemed most appropriate. The standing cone was removed and this tissue was then trimmed to the shape of the primary defect. The adipose tissue was also removed until only dermis and epidermis were left.  The skin margins of the secondary defect were undermined to an appropriate distance in all directions utilizing iris scissors.  The secondary defect was closed with interrupted buried subcutaneous sutures.  The skin edges were then re-apposed with running  sutures.  The skin graft was then placed in the primary defect and oriented appropriately.
Cartilage Graft Text: The defect edges were debeveled with a #15 scalpel blade.  Given the location of the defect, shape of the defect, the fact the defect involved a full thickness cartilage defect a cartilage graft was deemed most appropriate.  An appropriate donor site was identified, cleansed, and anesthetized. The cartilage graft was then harvested and transferred to the recipient site, oriented appropriately and then sutured into place.  The secondary defect was then repaired using a primary closure.
Composite Graft Text: The defect edges were debeveled with a #15 scalpel blade.  Given the location of the defect, shape of the defect, the proximity to free margins and the fact the defect was full thickness a composite graft was deemed most appropriate.  The defect was outline and then transferred to the donor site.  A full thickness graft was then excised from the donor site. The graft was then placed in the primary defect, oriented appropriately and then sutured into place.  The secondary defect was then repaired using a primary closure.
Epidermal Autograft Text: The defect edges were debeveled with a #15 scalpel blade.  Given the location of the defect, shape of the defect and the proximity to free margins an epidermal autograft was deemed most appropriate.  Using a sterile surgical marker, the primary defect shape was transferred to the donor site. The epidermal graft was then harvested.  The skin graft was then placed in the primary defect and oriented appropriately.
Dermal Autograft Text: The defect edges were debeveled with a #15 scalpel blade.  Given the location of the defect, shape of the defect and the proximity to free margins a dermal autograft was deemed most appropriate.  Using a sterile surgical marker, the primary defect shape was transferred to the donor site. The area thus outlined was incised deep to adipose tissue with a #15 scalpel blade.  The harvested graft was then trimmed of adipose and epidermal tissue until only dermis was left.  The skin graft was then placed in the primary defect and oriented appropriately.
Skin Substitute Text: The defect edges were debeveled with a #15 scalpel blade.  Given the location of the defect, shape of the defect and the proximity to free margins a skin substitute graft was deemed most appropriate.  The graft material was trimmed to fit the size of the defect. The graft was then placed in the primary defect and oriented appropriately.
Tissue Cultured Epidermal Autograft Text: The defect edges were debeveled with a #15 scalpel blade.  Given the location of the defect, shape of the defect and the proximity to free margins a tissue cultured epidermal autograft was deemed most appropriate.  The graft was then trimmed to fit the size of the defect.  The graft was then placed in the primary defect and oriented appropriately.
Xenograft Text: The defect edges were debeveled with a #15 scalpel blade.  Given the location of the defect, shape of the defect and the proximity to free margins a xenograft was deemed most appropriate.  The graft was then trimmed to fit the size of the defect.  The graft was then placed in the primary defect and oriented appropriately.
Purse String (Simple) Text: Given the location of the defect and the characteristics of the surrounding skin a purse string closure was deemed most appropriate.  Undermining was performed circumfirentially around the surgical defect.  A purse string suture was then placed and tightened.
Purse String (Intermediate) Text: Given the location of the defect and the characteristics of the surrounding skin a purse string intermediate closure was deemed most appropriate.  Undermining was performed circumfirentially around the surgical defect.  A purse string suture was then placed and tightened.
Partial Purse String (Simple) Text: Given the location of the defect and the characteristics of the surrounding skin a simple purse string closure was deemed most appropriate.  Undermining was performed circumfirentially around the surgical defect.  A purse string suture was then placed and tightened. Wound tension only allowed a partial closure of the circular defect.
Partial Purse String (Intermediate) Text: Given the location of the defect and the characteristics of the surrounding skin an intermediate purse string closure was deemed most appropriate.  Undermining was performed circumfirentially around the surgical defect.  A purse string suture was then placed and tightened. Wound tension only allowed a partial closure of the circular defect.
Localized Dermabrasion With Wire Brush Text: The patient was draped in routine manner.  Localized dermabrasion using 3 x 17 mm wire brush was performed in routine manner to papillary dermis. This spot dermabrasion is being performed to complete skin cancer reconstruction. It also will eliminate the other sun damaged precancerous cells that are known to be part of the regional effect of a lifetime's worth of sun exposure. This localized dermabrasion is therapeutic and should not be considered cosmetic in any regard.
Tarsorrhaphy Text: A tarsorrhaphy was performed using Frost sutures.
Complex Repair And Flap Additional Text (Will Appearing After The Standard Complex Repair Text): The complex repair was not sufficient to completely close the primary defect. The remaining additional defect was repaired with the flap mentioned below.
Complex Repair And Graft Additional Text (Will Appearing After The Standard Complex Repair Text): The complex repair was not sufficient to completely close the primary defect. The remaining additional defect was repaired with the graft mentioned below.
Manual Repair Warning Statement: We plan on removing the manually selected variable below in favor of our much easier automatic structured text blocks found in the previous tab. We decided to do this to help make the flow better and give you the full power of structured data. Manual selection is never going to be ideal in our platform and I would encourage you to avoid using manual selection from this point on, especially since I will be sunsetting this feature. It is important that you do one of two things with the customized text below. First, you can save all of the text in a word file so you can have it for future reference. Second, transfer the text to the appropriate area in the Library tab. Lastly, if there is a flap or graft type which we do not have you need to let us know right away so I can add it in before the variable is hidden. No need to panic, we plan to give you roughly 6 months to make the change.
Same Histology In Subsequent Stages Text: The pattern and morphology of the tumor is as described in the first stage.
No Residual Tumor Seen Histology Text: There were no malignant cells seen in the sections examined.
Inflammation Suggestive Of Cancer Camouflage Histology Text: There was a dense lymphocytic infiltrate which prevented adequate histologic evaluation of adjacent structures.
Bcc Histology Text: There were numerous aggregates of basaloid cells.
Bcc Infiltrative Histology Text: There were numerous aggregates of basaloid cells demonstrating an infiltrative pattern.
Mart-1 - Positive Histology Text: MART-1 staining demonstrates areas of higher density and clustering of melanocytes with Pagetoid spread upwards within the epidermis. The surgical margins are positive for tumor cells.
Mart-1 - Negative Histology Text: MART-1 staining demonstrates a normal density and pattern of melanocytes along the dermal-epidermal junction. The surgical margins are negative for tumor cells.
Information: Selecting Yes will display possible errors in your note based on the variables you have selected. This validation is only offered as a suggestion for you. PLEASE NOTE THAT THE VALIDATION TEXT WILL BE REMOVED WHEN YOU FINALIZE YOUR NOTE. IF YOU WANT TO FAX A PRELIMINARY NOTE YOU WILL NEED TO TOGGLE THIS TO 'NO' IF YOU DO NOT WANT IT IN YOUR FAXED NOTE.
Mohs Case Number: [id number]
Previous Accession (Optional): [id number]
Initial Size Of Lesion: 1.8
X Size Of Lesion In Cm (Optional): 1.5
Primary Defect Width In Cm (Final Defect Size - Required For Flaps/Grafts): 3.3
Repair Type: Repair deferred to allow adequate granulation to occur
Depth Of Tumor Invasion (For Histology): epidermis
Surgical Defect Width In Cm (Optional): 3.0
Anesthesia Volume In Cc: 8
Dressing: Puracol

## 2022-04-08 RX ORDER — HYDROCODONE BITARTRATE AND ACETAMINOPHEN 5; 325 MG/1; MG/1
TABLET ORAL
Qty: 20 | Refills: 0 | Status: ERX | COMMUNITY
Start: 2022-04-07

## 2022-04-11 ENCOUNTER — APPOINTMENT (RX ONLY)
Dept: URBAN - METROPOLITAN AREA CLINIC 22 | Facility: CLINIC | Age: 62
Setting detail: DERMATOLOGY
End: 2022-04-11

## 2022-04-11 DIAGNOSIS — Z48.817 ENCOUNTER FOR SURGICAL AFTERCARE FOLLOWING SURGERY ON THE SKIN AND SUBCUTANEOUS TISSUE: ICD-10-CM

## 2022-04-11 PROCEDURE — ? POST-OP WOUND CHECK

## 2022-04-11 PROCEDURE — ? ORDER TESTS

## 2022-04-11 ASSESSMENT — LOCATION DETAILED DESCRIPTION DERM: LOCATION DETAILED: LEFT TRIANGULAR FOSSA

## 2022-04-11 ASSESSMENT — LOCATION SIMPLE DESCRIPTION DERM: LOCATION SIMPLE: LEFT EAR

## 2022-04-11 ASSESSMENT — LOCATION ZONE DERM: LOCATION ZONE: EAR

## 2022-04-11 NOTE — PROCEDURE: POST-OP WOUND CHECK
Detail Level: Detailed
Add 98923 Cpt? (Important Note: In 2017 The Use Of 24275 Is Being Tracked By Cms To Determine Future Global Period Reimbursement For Global Periods): no

## 2022-04-11 NOTE — PROCEDURE: ORDER TESTS
Performing Laboratory: [id number]
Bill For Surgical Tray: no
Expected Date Of Service: 04/11/2022
Billing Type: Third-Party Bill

## 2022-04-14 ENCOUNTER — APPOINTMENT (RX ONLY)
Dept: URBAN - METROPOLITAN AREA CLINIC 22 | Facility: CLINIC | Age: 62
Setting detail: DERMATOLOGY
End: 2022-04-14

## 2022-04-14 DIAGNOSIS — Z48.817 ENCOUNTER FOR SURGICAL AFTERCARE FOLLOWING SURGERY ON THE SKIN AND SUBCUTANEOUS TISSUE: ICD-10-CM

## 2022-04-14 PROCEDURE — ? POST-OP WOUND EVALUATION

## 2022-04-14 ASSESSMENT — LOCATION ZONE DERM
LOCATION ZONE: EAR
LOCATION ZONE: NECK

## 2022-04-14 ASSESSMENT — LOCATION SIMPLE DESCRIPTION DERM
LOCATION SIMPLE: LEFT EAR
LOCATION SIMPLE: NECK

## 2022-04-14 ASSESSMENT — LOCATION DETAILED DESCRIPTION DERM
LOCATION DETAILED: LEFT SUPERIOR LATERAL NECK
LOCATION DETAILED: LEFT TRIANGULAR FOSSA

## 2022-04-14 NOTE — PROCEDURE: POST-OP WOUND EVALUATION
Detail Level: Detailed
Add 94787 Cpt? (Important Note: In 2017 The Use Of 25599 Is Being Tracked By Cms To Determine Future Global Period Reimbursement For Global Periods): yes
Wound Evaluated By (Optional): Luther Maciel MD
Wound Diameter In Cm(Optional): 0

## 2022-04-21 ENCOUNTER — APPOINTMENT (RX ONLY)
Dept: URBAN - METROPOLITAN AREA CLINIC 22 | Facility: CLINIC | Age: 62
Setting detail: DERMATOLOGY
End: 2022-04-21

## 2022-04-21 ENCOUNTER — RX ONLY (OUTPATIENT)
Age: 62
Setting detail: RX ONLY
End: 2022-04-21

## 2022-04-21 DIAGNOSIS — Z48.817 ENCOUNTER FOR SURGICAL AFTERCARE FOLLOWING SURGERY ON THE SKIN AND SUBCUTANEOUS TISSUE: ICD-10-CM

## 2022-04-21 PROCEDURE — ? POST-OP WOUND EVALUATION

## 2022-04-21 PROCEDURE — ? PRESCRIPTION

## 2022-04-21 PROCEDURE — 99024 POSTOP FOLLOW-UP VISIT: CPT

## 2022-04-21 RX ORDER — AMOXICILLIN AND CLAVULANATE POTASSIUM 500; 125 MG/1; 1/1
TABLET, FILM COATED ORAL
Qty: 20 | Refills: 0 | Status: ERX | COMMUNITY
Start: 2022-04-21

## 2022-04-21 RX ORDER — NITROGLYCERIN 20 MG/G
OINTMENT TOPICAL TID
Qty: 60 | Refills: 0 | Status: ERX | COMMUNITY
Start: 2022-04-21

## 2022-04-21 RX ORDER — HYDROCODONE BITARTRATE AND ACETAMINOPHEN 5; 325 MG/1; MG/1
TABLET ORAL QID
Qty: 20 | Refills: 0 | Status: ERX

## 2022-04-21 RX ADMIN — NITROGLYCERIN: 20 OINTMENT TOPICAL at 00:00

## 2022-04-21 ASSESSMENT — LOCATION ZONE DERM
LOCATION ZONE: NECK
LOCATION ZONE: EAR

## 2022-04-21 ASSESSMENT — LOCATION SIMPLE DESCRIPTION DERM
LOCATION SIMPLE: NECK
LOCATION SIMPLE: LEFT EAR

## 2022-04-21 ASSESSMENT — LOCATION DETAILED DESCRIPTION DERM
LOCATION DETAILED: LEFT SUPERIOR LATERAL NECK
LOCATION DETAILED: LEFT TRIANGULAR FOSSA

## 2022-04-21 NOTE — PROCEDURE: POST-OP WOUND EVALUATION
Detail Level: Detailed
Add 36534 Cpt? (Important Note: In 2017 The Use Of 96494 Is Being Tracked By Cms To Determine Future Global Period Reimbursement For Global Periods): yes
Wound Evaluated By (Optional): Luther Maciel MD
Wound Diameter In Cm(Optional): 0
Wound Crusting?: clean
Wound Discharge?: minimal discharge
Wound Drainage?: purulent drainage
Wound Color?: pink
Any New Or Residual Neoplasm?: No
Patient To Follow-Up With?: our clinic
Follow Up Units (Optional): 2
Follow Up Time Frame (Optional): weeks
Sutures?: intact

## 2022-05-05 ENCOUNTER — APPOINTMENT (RX ONLY)
Dept: URBAN - METROPOLITAN AREA CLINIC 22 | Facility: CLINIC | Age: 62
Setting detail: DERMATOLOGY
End: 2022-05-05

## 2022-05-05 DIAGNOSIS — Z48.817 ENCOUNTER FOR SURGICAL AFTERCARE FOLLOWING SURGERY ON THE SKIN AND SUBCUTANEOUS TISSUE: ICD-10-CM

## 2022-05-05 PROCEDURE — 99024 POSTOP FOLLOW-UP VISIT: CPT

## 2022-05-05 PROCEDURE — ? POST-OP WOUND EVALUATION

## 2022-05-05 ASSESSMENT — LOCATION DETAILED DESCRIPTION DERM
LOCATION DETAILED: LEFT TRIANGULAR FOSSA
LOCATION DETAILED: LEFT SUPERIOR LATERAL NECK

## 2022-05-05 ASSESSMENT — LOCATION SIMPLE DESCRIPTION DERM
LOCATION SIMPLE: LEFT EAR
LOCATION SIMPLE: NECK

## 2022-05-05 ASSESSMENT — LOCATION ZONE DERM
LOCATION ZONE: NECK
LOCATION ZONE: EAR

## 2022-05-05 NOTE — PROCEDURE: POST-OP WOUND EVALUATION
Detail Level: Detailed
Add 03819 Cpt? (Important Note: In 2017 The Use Of 87573 Is Being Tracked By Cms To Determine Future Global Period Reimbursement For Global Periods): yes
Wound Evaluated By (Optional): Luther Maciel MD
Wound Diameter In Cm(Optional): 0
Any New Or Residual Neoplasm?: No
Wound Crusting?: clean
Wound Color?: pink

## 2022-05-17 ENCOUNTER — OFFICE VISIT (OUTPATIENT)
Dept: URGENT CARE | Facility: PHYSICIAN GROUP | Age: 62
End: 2022-05-17
Payer: COMMERCIAL

## 2022-05-17 VITALS
BODY MASS INDEX: 28.88 KG/M2 | RESPIRATION RATE: 16 BRPM | HEART RATE: 70 BPM | OXYGEN SATURATION: 95 % | DIASTOLIC BLOOD PRESSURE: 70 MMHG | SYSTOLIC BLOOD PRESSURE: 134 MMHG | WEIGHT: 225 LBS | TEMPERATURE: 98.4 F | HEIGHT: 74 IN

## 2022-05-17 DIAGNOSIS — M79.645 PAIN OF FINGER OF LEFT HAND: ICD-10-CM

## 2022-05-17 DIAGNOSIS — L03.012 PARONYCHIA OF LEFT MIDDLE FINGER: ICD-10-CM

## 2022-05-17 PROCEDURE — 99203 OFFICE O/P NEW LOW 30 MIN: CPT

## 2022-05-17 RX ORDER — SULFAMETHOXAZOLE AND TRIMETHOPRIM 800; 160 MG/1; MG/1
1 TABLET ORAL 2 TIMES DAILY
Qty: 10 TABLET | Refills: 0 | Status: SHIPPED | OUTPATIENT
Start: 2022-05-17 | End: 2022-05-22

## 2022-05-17 ASSESSMENT — FIBROSIS 4 INDEX: FIB4 SCORE: 1.71

## 2022-05-18 NOTE — PROGRESS NOTES
Subjective     Samir Godinez is a 62 y.o. male who presents with Other ((L) middle finger swelling x 2 weeks and getting worse)          HPI       Patient presents today pain and swelling in the left middle finger for 2 weeks now. He reports trying to remove any possible splinters and did not find any.  Pain and swelling is on the skin around the nail of the left middle finger.  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  He denies any discharge from the skin.  Also denies any fever or chills.    Patient's current problem list, medications, and past medical/surgical history were reviewed in Epic.    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    He has no past medical history of Cough, Gasping for breath, Morning headache, Painful breathing, Sputum production, or Wheezing.  MEDS: No current outpatient medications on file.  ALLERGIES: No Known Allergies  SURGHX:   Past Surgical History:   Procedure Laterality Date   • HIP REVISION TOTAL Right 10/19/2020    Procedure: REVISION, TOTAL ARTHROPLASTY, HIP;  Surgeon: Emigdio Sales M.D.;  Location: SURGERY [facility];  Service: Orthopedics   • CARPAL TUNNEL RELEASE Left 07/2020   • PB RECONSTR TOTAL SHOULDER IMPLANT Right 11/27/2019    Procedure: ARTHROPLASTY, SHOULDER, TOTAL;  Surgeon: Denzel Veliz M.D.;  Location: [facility];  Service: Orthopedics   • PB REVISE MEDIAN N/CARPAL TUNNEL SURG Right 11/27/2019    Procedure: RELEASE, CARPAL TUNNEL;  Surgeon: Denzel Veliz M.D.;  Location: SURGERY [facility];  Service: Orthopedics   • PB RECONSTR TOTAL SHOULDER IMPLANT Left 9/25/2019    Procedure: ARTHROPLASTY, SHOULDER, TOTAL- ANATOMIC;  Surgeon: Denzel Veliz M.D.;  Location: SURGERY [facility];  Service: Orthopedics   • HIP REPLACEMENT, REVISION Right 2014   •  "HIP REPLACEMENT, TOTAL Right 2008   • HIP ARTHROSCOPY Right 2007   • KNEE ARTHROSCOPY Left 2000     SOCHX:  reports that he has never smoked. He has never used smokeless tobacco. He reports current alcohol use. He reports that he does not use drugs.  FH: Reviewed with patient, not pertinent to this visit.     ROS           Objective     /70 (BP Location: Left arm, Patient Position: Sitting, BP Cuff Size: Large adult)   Pulse 70   Temp 36.9 °C (98.4 °F) (Temporal)   Resp 16   Ht 1.88 m (6' 2\")   Wt 102 kg (225 lb)   SpO2 95%   BMI 28.89 kg/m²      Physical Exam  Constitutional:       Appearance: Normal appearance.   HENT:      Head: Normocephalic.      Nose: Nose normal.   Eyes:      Extraocular Movements: Extraocular movements intact.   Cardiovascular:      Rate and Rhythm: Normal rate and regular rhythm.      Pulses: Normal pulses.      Heart sounds: Normal heart sounds.   Pulmonary:      Effort: Pulmonary effort is normal.      Breath sounds: Normal breath sounds.   Musculoskeletal:         General: Normal range of motion.      Cervical back: Normal range of motion.   Skin:     General: Skin is warm.      Comments: Erythema and swelling on the skin around the nail of the left middle finger.   Neurological:      General: No focal deficit present.      Mental Status: He is alert.   Psychiatric:         Mood and Affect: Mood normal.         Behavior: Behavior normal.          Assessment & Plan     1. Paronychia of left middle finger    - sulfamethoxazole-trimethoprim (BACTRIM DS) 800-160 MG tablet; Take 1 Tablet by mouth 2 times a day for 5 days.  Dispense: 10 Tablet; Refill: 0    2. Pain of finger of left hand    Patient's presentation is consistent with paronychia left middle finger.  Bactrim twice daily for 5 days.  Recommended in Epson salts or applying warm compress to the area for additional relief.  May take ibuprofen or Tylenol as needed for pain.  Discussed treatment plan with patient, he is " agreeable and verbalized understanding.  Educated patient on signs and symptoms to watch out for, and return to the clinic or go to the ER.    Differential diagnoses, supportive care, and indications for immediate follow-up discussed with patient.   Pathogenesis of diagnosis discussed including typical length and natural progression.   Instructed to return to clinic or nearest emergency department for any change in condition, further concerns, or worsening of symptoms.    Electronically Signed by BRIANA Chase

## 2022-05-19 ENCOUNTER — APPOINTMENT (RX ONLY)
Dept: URBAN - METROPOLITAN AREA CLINIC 22 | Facility: CLINIC | Age: 62
Setting detail: DERMATOLOGY
End: 2022-05-19

## 2022-05-19 DIAGNOSIS — Z48.817 ENCOUNTER FOR SURGICAL AFTERCARE FOLLOWING SURGERY ON THE SKIN AND SUBCUTANEOUS TISSUE: ICD-10-CM

## 2022-05-19 PROCEDURE — ? POST-OP WOUND EVALUATION

## 2022-05-19 ASSESSMENT — LOCATION DETAILED DESCRIPTION DERM
LOCATION DETAILED: LEFT TRIANGULAR FOSSA
LOCATION DETAILED: LEFT SUPERIOR LATERAL NECK

## 2022-05-19 ASSESSMENT — LOCATION SIMPLE DESCRIPTION DERM
LOCATION SIMPLE: NECK
LOCATION SIMPLE: LEFT EAR

## 2022-05-19 ASSESSMENT — LOCATION ZONE DERM
LOCATION ZONE: EAR
LOCATION ZONE: NECK

## 2022-05-19 NOTE — PROCEDURE: POST-OP WOUND EVALUATION
Detail Level: Detailed
Add 80058 Cpt? (Important Note: In 2017 The Use Of 32183 Is Being Tracked By Cms To Determine Future Global Period Reimbursement For Global Periods): yes
Wound Evaluated By (Optional): Luther Maciel MD
Wound Diameter In Cm(Optional): 0
Any New Or Residual Neoplasm?: No

## 2022-09-20 PROBLEM — S83.232A COMPLEX TEAR OF MEDIAL MENISCUS OF LEFT KNEE AS CURRENT INJURY: Status: ACTIVE | Noted: 2022-09-20

## 2022-09-20 PROBLEM — S83.231A COMPLEX TEAR OF MEDIAL MENISCUS OF RIGHT KNEE AS CURRENT INJURY: Status: ACTIVE | Noted: 2022-09-20

## 2023-06-27 ENCOUNTER — APPOINTMENT (OUTPATIENT)
Dept: RADIOLOGY | Facility: MEDICAL CENTER | Age: 63
End: 2023-06-27
Attending: SURGERY
Payer: COMMERCIAL

## 2023-07-27 ENCOUNTER — APPOINTMENT (OUTPATIENT)
Dept: RADIOLOGY | Facility: MEDICAL CENTER | Age: 63
End: 2023-07-27
Attending: SURGERY
Payer: COMMERCIAL

## 2024-06-27 ENCOUNTER — OFFICE VISIT (OUTPATIENT)
Dept: URGENT CARE | Facility: PHYSICIAN GROUP | Age: 64
End: 2024-06-27
Payer: COMMERCIAL

## 2024-06-27 VITALS
HEIGHT: 74 IN | WEIGHT: 210 LBS | OXYGEN SATURATION: 98 % | HEART RATE: 57 BPM | TEMPERATURE: 98.8 F | SYSTOLIC BLOOD PRESSURE: 130 MMHG | RESPIRATION RATE: 16 BRPM | BODY MASS INDEX: 26.95 KG/M2 | DIASTOLIC BLOOD PRESSURE: 68 MMHG

## 2024-06-27 DIAGNOSIS — R19.09 INGUINAL SWELLING: ICD-10-CM

## 2024-06-27 PROCEDURE — 99214 OFFICE O/P EST MOD 30 MIN: CPT | Performed by: PHYSICIAN ASSISTANT

## 2024-06-27 PROCEDURE — 3075F SYST BP GE 130 - 139MM HG: CPT | Performed by: PHYSICIAN ASSISTANT

## 2024-06-27 PROCEDURE — 3078F DIAST BP <80 MM HG: CPT | Performed by: PHYSICIAN ASSISTANT

## 2024-06-27 ASSESSMENT — ENCOUNTER SYMPTOMS
CONSTITUTIONAL NEGATIVE: 1
DIARRHEA: 0
NAUSEA: 0
CARDIOVASCULAR NEGATIVE: 1
VOMITING: 0
RESPIRATORY NEGATIVE: 1
ABDOMINAL PAIN: 0
FLANK PAIN: 0

## 2024-06-28 ENCOUNTER — APPOINTMENT (OUTPATIENT)
Dept: RADIOLOGY | Facility: MEDICAL CENTER | Age: 64
End: 2024-06-28
Attending: PHYSICIAN ASSISTANT
Payer: COMMERCIAL

## 2024-06-28 NOTE — PROGRESS NOTES
"  Subjective:     Samir Godinez  is a 64 y.o. male who presents for Other (K3scddd. Lymph node in R groin area swollen)       He presents today with concerns of swollen lymph node over the right inguinal region has been present over the past month.  States that it is grown slightly larger within the last month but it does not fluctuate in size with activity.  No trauma or injury associate with symptom onset.  No recent sexual partners, no known exposures to STDs.  No painful swelling or painful urination.  No pain radiating into the testicular region.  No testicular pain or swelling.       Review of Systems   Constitutional: Negative.    Respiratory: Negative.     Cardiovascular: Negative.    Gastrointestinal:  Negative for abdominal pain, diarrhea, nausea and vomiting.   Genitourinary:  Negative for dysuria, flank pain, frequency, hematuria and urgency.        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   Constitutional:       General: He is not in acute distress.     Appearance: Normal appearance. He is not ill-appearing, toxic-appearing or diaphoretic.   HENT:      Head: Normocephalic.   Eyes:      General: No scleral icterus.     Conjunctiva/sclera: Conjunctivae normal.   Cardiovascular:      Rate and Rhythm: Normal rate and regular rhythm.   Pulmonary:      Effort: Pulmonary effort is normal. No respiratory distress.      Breath sounds: Normal breath sounds. No stridor. No wheezing or rhonchi.   Abdominal:      General: Abdomen " is flat. Bowel sounds are normal. There is no distension.      Palpations: Abdomen is soft.      Tenderness: There is no abdominal tenderness. There is no right CVA tenderness, left CVA tenderness or guarding.   Genitourinary:         Comments: Localized area of swelling present over the above marked region of the right inguinal area.  Mild tenderness palpation over the above marked region.  Patient deferred scrotum and penile examination  Musculoskeletal:      Cervical back: Neck supple.   Neurological:      Mental Status: He is alert and oriented to person, place, and time.   Psychiatric:         Mood and Affect: Mood normal.         Behavior: Behavior normal.         Thought Content: Thought content normal.         Judgment: Judgment normal.             Diagnostic testing:    Ultrasound right inguinal-scheduled for 6/28/2024    Assessment/Plan:     Encounter Diagnoses   Name Primary?    Inguinal swelling           Plan for care for today's complaint includes obtaining a right-sided inguinal ultrasound to evaluate for the localized swelling present in the right inguinal region.  Ultrasound was initially scheduled for the patient on 6/28/2024 but the patient did express that he may change the imaging to a later time, will contact patient via phone call discussed results once they are obtained.  If it shows enlarged lymph node then we will further evaluate for genitourinary cause of the node enlargement at that time, if diagnosed as a hernia then we will refer to general surgery.  Use over the counter medication as needed for pain.  Return to urgent care follow-up emergency department symptoms worsen or become severe..    See AVS Instructions below for written guidance provided to patient on after-visit management and care in addition to our verbal discussion during the visit.    Please note that this dictation was created using voice recognition software. I have attempted to correct all errors, but there may be  sound-alike, spelling, grammar and possibly content errors that I did not discover before finalizing the note.    Maury Andry WRIGHT

## 2024-07-15 ENCOUNTER — HOSPITAL ENCOUNTER (OUTPATIENT)
Dept: RADIOLOGY | Facility: MEDICAL CENTER | Age: 64
End: 2024-07-15
Attending: PHYSICIAN ASSISTANT
Payer: COMMERCIAL

## 2024-07-15 DIAGNOSIS — R19.09 INGUINAL SWELLING: ICD-10-CM

## 2024-07-15 PROCEDURE — 76857 US EXAM PELVIC LIMITED: CPT

## 2024-07-16 ENCOUNTER — TELEPHONE (OUTPATIENT)
Dept: URGENT CARE | Facility: PHYSICIAN GROUP | Age: 64
End: 2024-07-16
Payer: COMMERCIAL

## 2024-07-16 DIAGNOSIS — K40.90 RIGHT INGUINAL HERNIA: ICD-10-CM

## 2024-07-26 ENCOUNTER — HOSPITAL ENCOUNTER (OUTPATIENT)
Dept: LAB | Facility: MEDICAL CENTER | Age: 64
End: 2024-07-26
Attending: PHYSICIAN ASSISTANT
Payer: COMMERCIAL

## 2024-07-26 DIAGNOSIS — M25.551 PAIN OF RIGHT HIP: ICD-10-CM

## 2024-07-26 DIAGNOSIS — Z96.641 HISTORY OF TOTAL RIGHT HIP ARTHROPLASTY: ICD-10-CM

## 2024-07-26 LAB
BASOPHILS # BLD AUTO: 0.6 % (ref 0–1.8)
BASOPHILS # BLD: 0.04 K/UL (ref 0–0.12)
CRP SERPL HS-MCNC: <0.3 MG/DL (ref 0–0.75)
EOSINOPHIL # BLD AUTO: 0.06 K/UL (ref 0–0.51)
EOSINOPHIL NFR BLD: 0.8 % (ref 0–6.9)
ERYTHROCYTE [DISTWIDTH] IN BLOOD BY AUTOMATED COUNT: 40.3 FL (ref 35.9–50)
ERYTHROCYTE [SEDIMENTATION RATE] IN BLOOD BY WESTERGREN METHOD: 0 MM/HOUR (ref 0–20)
HCT VFR BLD AUTO: 49.5 % (ref 42–52)
HGB BLD-MCNC: 17.1 G/DL (ref 14–18)
IMM GRANULOCYTES # BLD AUTO: 0.02 K/UL (ref 0–0.11)
IMM GRANULOCYTES NFR BLD AUTO: 0.3 % (ref 0–0.9)
LYMPHOCYTES # BLD AUTO: 1.46 K/UL (ref 1–4.8)
LYMPHOCYTES NFR BLD: 20.6 % (ref 22–41)
MCH RBC QN AUTO: 30.9 PG (ref 27–33)
MCHC RBC AUTO-ENTMCNC: 34.5 G/DL (ref 32.3–36.5)
MCV RBC AUTO: 89.5 FL (ref 81.4–97.8)
MONOCYTES # BLD AUTO: 0.64 K/UL (ref 0–0.85)
MONOCYTES NFR BLD AUTO: 9 % (ref 0–13.4)
NEUTROPHILS # BLD AUTO: 4.86 K/UL (ref 1.82–7.42)
NEUTROPHILS NFR BLD: 68.7 % (ref 44–72)
NRBC # BLD AUTO: 0 K/UL
NRBC BLD-RTO: 0 /100 WBC (ref 0–0.2)
PLATELET # BLD AUTO: 187 K/UL (ref 164–446)
PMV BLD AUTO: 9.1 FL (ref 9–12.9)
RBC # BLD AUTO: 5.53 M/UL (ref 4.7–6.1)
WBC # BLD AUTO: 7.1 K/UL (ref 4.8–10.8)

## 2024-07-26 PROCEDURE — 85025 COMPLETE CBC W/AUTO DIFF WBC: CPT

## 2024-07-26 PROCEDURE — 85652 RBC SED RATE AUTOMATED: CPT

## 2024-07-26 PROCEDURE — 86140 C-REACTIVE PROTEIN: CPT

## 2024-07-26 PROCEDURE — 36415 COLL VENOUS BLD VENIPUNCTURE: CPT

## 2024-07-26 PROCEDURE — 83018 HEAVY METAL QUAN EACH NES: CPT

## 2024-07-29 LAB — COBALT SERPL-MCNC: <1 UG/L

## 2024-08-20 ENCOUNTER — HOSPITAL ENCOUNTER (OUTPATIENT)
Dept: RADIOLOGY | Facility: MEDICAL CENTER | Age: 64
End: 2024-08-20
Payer: COMMERCIAL

## 2024-08-20 DIAGNOSIS — Z96.641 PRESENCE OF RIGHT ARTIFICIAL HIP JOINT: ICD-10-CM

## 2024-08-20 PROCEDURE — 73700 CT LOWER EXTREMITY W/O DYE: CPT | Mod: RT

## 2024-10-17 ENCOUNTER — APPOINTMENT (OUTPATIENT)
Dept: RADIOLOGY | Facility: MEDICAL CENTER | Age: 64
End: 2024-10-17
Attending: ORTHOPAEDIC SURGERY
Payer: COMMERCIAL

## 2024-12-07 ENCOUNTER — HOSPITAL ENCOUNTER (OUTPATIENT)
Dept: RADIOLOGY | Facility: MEDICAL CENTER | Age: 64
End: 2024-12-07
Attending: ORTHOPAEDIC SURGERY
Payer: COMMERCIAL

## 2024-12-07 DIAGNOSIS — M25.561 PAIN IN JOINT OF RIGHT KNEE: ICD-10-CM

## 2024-12-07 PROCEDURE — 73721 MRI JNT OF LWR EXTRE W/O DYE: CPT | Mod: RT

## 2024-12-10 NOTE — ED NOTES
ERP at bedside  
Hospitalist in to evaluate patient for further treatment.  
Med rec updated and complete  Allergies reviewed  Interviewed pt with wife at bedside with permission from pt.  Pt reports no vitamins or OTC's.  Pt reports no antibiotics in the last 2 weeks  Pt reports that he has not taken any of his PERCOCET 10-325MG and OXYCODONE 5MG for 3-4 days   Pt reports that he only takes his XANAX 0.5MG 1 tablet at nighttime only.    
Pt and friend were updated on movement upstairs.  
Pt is up to the restroom with assist.  
Pt returned to room with a bit of an unsteady gait. Pt was placed back on the monitor and ERP updated him and his friend at bedside.  
Pt to CT via stretcher.  
Patent

## 2024-12-26 PROBLEM — S83.271A COMPLEX TEAR OF LATERAL MENISCUS OF RIGHT KNEE AS CURRENT INJURY: Status: ACTIVE | Noted: 2024-12-26

## 2024-12-26 PROBLEM — S83.272A COMPLEX TEAR OF LATERAL MENISCUS OF LEFT KNEE AS CURRENT INJURY: Status: ACTIVE | Noted: 2024-12-26

## 2025-01-31 ENCOUNTER — PATIENT OUTREACH (OUTPATIENT)
Dept: ONCOLOGY | Facility: MEDICAL CENTER | Age: 65
End: 2025-01-31
Payer: COMMERCIAL

## 2025-01-31 ENCOUNTER — APPOINTMENT (OUTPATIENT)
Dept: HEMATOLOGY ONCOLOGY | Facility: MEDICAL CENTER | Age: 65
End: 2025-01-31
Attending: INTERNAL MEDICINE
Payer: COMMERCIAL

## 2025-02-10 ENCOUNTER — HOSPITAL ENCOUNTER (OUTPATIENT)
Dept: HEMATOLOGY ONCOLOGY | Facility: MEDICAL CENTER | Age: 65
End: 2025-02-10
Attending: INTERNAL MEDICINE
Payer: COMMERCIAL

## 2025-02-10 VITALS
OXYGEN SATURATION: 100 % | SYSTOLIC BLOOD PRESSURE: 108 MMHG | RESPIRATION RATE: 15 BRPM | HEART RATE: 64 BPM | TEMPERATURE: 96.7 F | BODY MASS INDEX: 25.46 KG/M2 | WEIGHT: 198.41 LBS | DIASTOLIC BLOOD PRESSURE: 72 MMHG | HEIGHT: 74 IN

## 2025-02-10 DIAGNOSIS — C61 PROSTATE CANCER (HCC): ICD-10-CM

## 2025-02-10 PROCEDURE — 99212 OFFICE O/P EST SF 10 MIN: CPT | Performed by: INTERNAL MEDICINE

## 2025-02-10 PROCEDURE — 99204 OFFICE O/P NEW MOD 45 MIN: CPT | Performed by: INTERNAL MEDICINE

## 2025-02-10 RX ORDER — OXYCODONE AND ACETAMINOPHEN 5; 325 MG/1; MG/1
1 TABLET ORAL 4 TIMES DAILY PRN
COMMUNITY

## 2025-02-10 ASSESSMENT — PAIN SCALES - GENERAL: PAINLEVEL_OUTOF10: 7=MODERATE-SEVERE PAIN

## 2025-02-11 ENCOUNTER — PATIENT OUTREACH (OUTPATIENT)
Dept: ONCOLOGY | Facility: MEDICAL CENTER | Age: 65
End: 2025-02-11
Payer: COMMERCIAL

## 2025-02-11 NOTE — PROGRESS NOTES
First call placed to Pat to introduce self and assess. Pat answers phone and is able to listen to ONN for a few minutes. Confirmed upcoming NP consult with Dr. Morrissey on 2/20. Call was short but Pat denied any needs or concerns at this time.

## 2025-02-11 NOTE — PROGRESS NOTES
CONSULT: HEMATOLOGY/ONCOLOGY        Diagnosis: G 6( 3+3) prostate cancer    History of Presenting Illness:  Samir Godinez is a 64 y.o. male who has a history of atrial fibrillation s/p ablation and right hip and shoulder surgery.  Patient had testosterone replacement therapy(TRT) for several years and the last time he had a testosterone was January 2025.  His PSA was 2.7 on December 2, 2022 which went up to 4.8 on August 27, 2024.  Patient had a MRI of the prostate on November 29, 2024 due to rising PSA which showed a very low possibility of prostate cancer.  Eventually he had a prostate biopsy on January 2, 2025 which showed Ryan score 6(3+3) prostate adenocarcinoma.  I was consulted for further management and care    Interval History:  Patient is here for consultation.      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        Past Surgical History:   Procedure Laterality Date    HIP REVISION TOTAL Right 10/19/2020    Procedure: REVISION, TOTAL ARTHROPLASTY, HIP;  Surgeon: Emigdio Sales M.D.;  Location: [facility];  Service: Orthopedics    CARPAL TUNNEL RELEASE Left 07/2020    PB RECONSTR TOTAL SHOULDER IMPLANT Right 11/27/2019    Procedure: ARTHROPLASTY, SHOULDER, TOTAL;  Surgeon: Denzel Veliz M.D.;  Location: [facility];  Service: Orthopedics    CT NEUROPLASTY & OR TRANSPOS MEDIAN NRV CARPAL RADHA Right 11/27/2019    Procedure: RELEASE, CARPAL TUNNEL;  Surgeon: Denzel Veliz M.D.;  Location: [facility];  Service: Orthopedics    PB RECONSTR TOTAL SHOULDER IMPLANT Left 9/25/2019    Procedure: ARTHROPLASTY, SHOULDER, TOTAL- ANATOMIC;  Surgeon: Denzel Veliz M.D.;  Location: [facility];  Service: Orthopedics    HIP  "REPLACEMENT, REVISION Right 2014    HIP REPLACEMENT, TOTAL Right 2008    HIP ARTHROSCOPY Right 2007    KNEE ARTHROSCOPY Left 2000       History reviewed. No pertinent family history.    OB History   No obstetric history on file.       Allergies as of 02/10/2025    (No Known Allergies)         Current Outpatient Medications:     oxyCODONE-acetaminophen (PERCOCET) 5-325 MG Tab, Take 1 Tablet by mouth 4 times a day as needed. for pain, Disp: , Rfl:     meloxicam (MOBIC) 15 MG tablet, Take 1 Tablet by mouth every day., Disp: 30 Tablet, Rfl: 0    traZODone (DESYREL) 50 MG Tab, Take 1 Tablet by mouth every evening., Disp: 30 Tablet, Rfl: 0    ibuprofen (MOTRIN) 800 MG Tab, , Disp: , Rfl:     meloxicam (MOBIC) 15 MG tablet, TAKE 1 TABLET BY MOUTH EVERY DAY, Disp: 30 Tablet, Rfl: 0    anastrozole (ARIMIDEX) 1 MG Tab, TAKE 1 TABLET BY MOUTH 3 TIMES A WEEK, Disp: , Rfl:     Review of Systems:  Patient denies fever, chills, nausea, vomiting, chest pain, sob, blood in stools, black stool, blood in urine. All systems are reviewed See HPI.        Physical Exam:  /72 (BP Location: Left arm, Patient Position: Sitting, BP Cuff Size: Adult)   Pulse 64   Temp 35.9 °C (96.7 °F) (Temporal)   Resp 15   Ht 1.88 m (6' 2\")   Wt 90 kg (198 lb 6.6 oz)   SpO2 100%  Body mass index is 25.47 kg/m².    Constitutional:  NAD, well appearing.  HEENT:   CARO EOMI  Cardiovascular: RRR.   No m/r/g. No carotid bruits.       Lungs:   Clear, no wheezing, no rales, no respiratory distress.  Abdomen: Soft, non-tender. + BS, no masses, no suprapubic tenderness, no hepatomegaly.  Extremities:  No pedal edema. Bilateral and radial pulses present.  Skin:  Warm and dry.    Neurologic: Alert & oriented x 3, CN II-XII grossly intact, strength and sensation grossly intact.  No focal deficits noted.  Psychiatric:  Affect normal, mood normal, judgment normal.        ASSESSMENT and PLAN    T1c, G6(3+3) stage 1, prostate adenocarcinoma,   I spent a " significant amount of time with the patient regarding further management and care.  I explained to him in detail regarding prognosis and management of very low risk prostate cancer. His Worland score is 6 and a PSA is less than 10 and his prognosis is excellent.  I discussed with him in detail regarding benefit and side effects of active surveillance versus treatment.  In terms of treatment, he wants to have a radiation treatment over surgery.  After long discussion, patient wants to have a radiation treatment even though his a risk of progression and dying of prostate cancer is a very small.  I will refer this patient to Dr. Jayla Morrissey for evaluation and possible radiation treatment.      I did not give him any further appointment and follow as future need arises.      Any questions and concerns raised by the patient were answered to the best of my ability.   Thank you for allowing me to participate in the care for this patient. Please feel free to contact me for any questions or concerns.     Total time spent on chart review, clinic encounter, and documentation: 45 minutes.       Please note that this dictation was created using voice recognition software. I have made every reasonable attempt to correct obvious errors, but I expect that there are errors of grammar and possibly content that I did not discover before finalizing the note.

## 2025-02-13 ENCOUNTER — PATIENT OUTREACH (OUTPATIENT)
Dept: ONCOLOGY | Facility: MEDICAL CENTER | Age: 65
End: 2025-02-13
Payer: COMMERCIAL

## 2025-02-13 NOTE — PROGRESS NOTES
On February 13, 2025, Oncology Social Worker Ewa Fuchs attempted telephone contact with pt. to follow up on psychosocial distress screening.  OSW Jef left voicemail message for pt. requesting pt. call back at earliest convenience.  OSW Jef left contact information in voicemail message.

## 2025-02-17 ASSESSMENT — LIFESTYLE VARIABLES
SMOKING_STATUS: NO
TOBACCO_USE: NO

## 2025-02-20 ENCOUNTER — HOSPITAL ENCOUNTER (OUTPATIENT)
Dept: RADIOLOGY | Facility: MEDICAL CENTER | Age: 65
End: 2025-02-20
Attending: UROLOGY
Payer: COMMERCIAL

## 2025-02-20 ENCOUNTER — HOSPITAL ENCOUNTER (OUTPATIENT)
Dept: RADIATION ONCOLOGY | Facility: MEDICAL CENTER | Age: 65
End: 2025-02-20
Attending: RADIOLOGY
Payer: COMMERCIAL

## 2025-02-20 VITALS
DIASTOLIC BLOOD PRESSURE: 59 MMHG | BODY MASS INDEX: 24.54 KG/M2 | TEMPERATURE: 98.4 F | WEIGHT: 191.14 LBS | RESPIRATION RATE: 18 BRPM | SYSTOLIC BLOOD PRESSURE: 107 MMHG | OXYGEN SATURATION: 99 % | HEART RATE: 63 BPM

## 2025-02-20 DIAGNOSIS — C61 PROSTATE CANCER (HCC): ICD-10-CM

## 2025-02-20 PROCEDURE — 99205 OFFICE O/P NEW HI 60 MIN: CPT | Performed by: RADIOLOGY

## 2025-02-20 PROCEDURE — 99214 OFFICE O/P EST MOD 30 MIN: CPT | Performed by: RADIOLOGY

## 2025-02-20 RX ORDER — TAMSULOSIN HYDROCHLORIDE 0.4 MG/1
0.4 CAPSULE ORAL
Qty: 90 CAPSULE | Refills: 2 | Status: SHIPPED | OUTPATIENT
Start: 2025-02-20

## 2025-02-20 RX ORDER — CELECOXIB 100 MG/1
100 CAPSULE ORAL PRN
COMMUNITY

## 2025-02-20 ASSESSMENT — PAIN SCALES - GENERAL: PAINLEVEL_OUTOF10: 5=MODERATE PAIN

## 2025-02-20 NOTE — CONSULTS
RADIATION ONCOLOGY CONSULT  Patient name:  Samir Godinez    Primary Physician:  Pcp Pt States None MRN: [id number]  [facility]: [id number]   Referring physician:  Brian Musa M.D.   : 1960, 64 y.o.     DATE OF SERVICE: 2025    IDENTIFICATION: A 64 y.o. male with  Visit Diagnoses     ICD-10-CM   1. Prostate cancer (HCC)  C61     Prostate cancer (HCC)  Staging form: Prostate, AJCC 8th Edition  - Clinical stage from 2/10/2025: cT1c, cN0, cM0, PSA: 4.8 - Signed by Brian Musa M.D. on 2/10/2025  Prostate specific antigen (PSA) range: Less than 10    He is here at the kind request of Brian Salazar M.D.     HISTORY OF PRESENT ILLNESS:   Subjective    This is a 64-year-old gentleman who comes today to discuss options for his prostate cancer.  He was noted to have a mildly elevated PSA of 4.8 in August of last year.  This led to workup with an MRI that was read as PI-RADS 1, but ultimately he had a prostate biopsy on January 3 that confirmed Harmony 6 adenocarcinoma in 5 out of 10 cores.    He has seen multiple physicians since then.  He saw urology, then saw radiation oncology at [facility], and then saw Dr. Musa in medical oncology at [facility].  He seems to have some difficulty with living with his diagnosis, it is interfering with his sleep, and he is constantly thinking about it.  He has been counseled for active surveillance and he comes today to discuss options.    Urinary function is relatively poor, IPSS score of 20, not on any urinary medications, has never been prescribed alpha blockers before.          PAST MEDICAL HISTORY:   Past Medical History:   Diagnosis Date    Anxiety     Daytime sleepiness     Pain 2019    right shoulder.9/10    Psychiatric disorder     Snoring     no sleep study    Unspecified disorder of the pituitary gland and its hypothalamic control        PAST SURGICAL HISTORY:  Past Surgical History:   Procedure Laterality Date    HIP REVISION TOTAL Right 10/19/2020     Procedure: REVISION, TOTAL ARTHROPLASTY, HIP;  Surgeon: Emigdio Sales M.D.;  Location: SURGERY [facility];  Service: Orthopedics    CARPAL TUNNEL RELEASE Left 07/2020    PB RECONSTR TOTAL SHOULDER IMPLANT Right 11/27/2019    Procedure: ARTHROPLASTY, SHOULDER, TOTAL;  Surgeon: Denzel Veliz M.D.;  Location: SURGERY [facility];  Service: Orthopedics    ND NEUROPLASTY & OR TRANSPOS MEDIAN NRV CARPAL RADHA Right 11/27/2019    Procedure: RELEASE, CARPAL TUNNEL;  Surgeon: Denzel Veliz M.D.;  Location: SURGERY [facility];  Service: Orthopedics    PB RECONSTR TOTAL SHOULDER IMPLANT Left 9/25/2019    Procedure: ARTHROPLASTY, SHOULDER, TOTAL- ANATOMIC;  Surgeon: Denzel Veliz M.D.;  Location: SURGERY [facility];  Service: Orthopedics    HIP REPLACEMENT, REVISION Right 2014    HIP REPLACEMENT, TOTAL Right 2008    HIP ARTHROSCOPY Right 2007    KNEE ARTHROSCOPY Left 2000       CURRENT MEDICATIONS:  Current Outpatient Medications   Medication Sig Dispense Refill    celecoxib (CELEBREX) 100 MG Cap Take 100 mg by mouth as needed.      tamsulosin (FLOMAX) 0.4 MG capsule Take 1 Capsule by mouth 1/2 hour after dinner. 90 Capsule 2    oxyCODONE-acetaminophen (PERCOCET) 5-325 MG Tab Take 1 Tablet by mouth 4 times a day as needed. for pain      traZODone (DESYREL) 50 MG Tab Take 1 Tablet by mouth every evening. 30 Tablet 0    ibuprofen (MOTRIN) 800 MG Tab       meloxicam (MOBIC) 15 MG tablet Take 1 Tablet by mouth every day. (Patient not taking: Reported on 2/20/2025) 30 Tablet 0    meloxicam (MOBIC) 15 MG tablet TAKE 1 TABLET BY MOUTH EVERY DAY 30 Tablet 0    anastrozole (ARIMIDEX) 1 MG Tab TAKE 1 TABLET BY MOUTH 3 TIMES A WEEK       No current facility-administered medications for this encounter.       ALLERGIES:    Patient has no known allergies.    FAMILY HISTORY:    History reviewed. No pertinent family history.        SOCIAL HISTORY:     reports that he has never smoked. He has never been  exposed to tobacco smoke. He has never used smokeless tobacco. He reports that he does not currently use alcohol. He reports that he does not use drugs.     OCCUPATION: working as a  .     PAIN ASSESSMENT:      2/20/2025    12:40 PM 2/10/2025     3:43 PM   Pain Assessment   Pain Score 5=MODERATE P 7=MODERATE-S   Pain Loc HIP GENERALIZED       REVIEW OF SYSTEMS:  A review of systems taken.  Pertinence in HPI.  All others negative.    ECOG PERFORMANCE STATUS:      2/20/2025    12:49 PM   ECOG Performance Review   ECOG Performance Status Fully active, able to carry on all pre-disease performance without restriction       PHYSICAL EXAM:    Vitals:    02/20/25 1240   BP: 107/59   BP Location: Right arm   Patient Position: Sitting   BP Cuff Size: Adult long   Pulse: 63   Resp: 18   Temp: 36.9 °C (98.4 °F)   TempSrc: Temporal   SpO2: 99%   Weight: 86.7 kg (191 lb 2.2 oz)   Pain Score: 5=Moderate Pain        Physical Exam  Constitutional:       Appearance: Normal appearance.   HENT:      Head: Normocephalic and atraumatic.   Eyes:      Extraocular Movements: Extraocular movements intact.      Conjunctiva/sclera: Conjunctivae normal.   Cardiovascular:      Rate and Rhythm: Normal rate and regular rhythm.   Pulmonary:      Effort: Pulmonary effort is normal. No respiratory distress.   Abdominal:      General: Abdomen is flat. There is no distension.      Palpations: Abdomen is soft.      Tenderness: There is no abdominal tenderness.   Musculoskeletal:         General: Normal range of motion.   Neurological:      General: No focal deficit present.      Mental Status: He is alert and oriented to person, place, and time.           INTERNATIONAL PROSTATE SYMPTOM SCORE (I-PSS):      2/20/2025    12:42 PM   I-PSS Review   Incomplete Emptying- How often have you had the sensation of not emptying your bladder? 4   Frequency- How often have you had to urinate less than every two hours? 3   Intermittency- How  often have you found you stopped and started again several times when you urinated? 3   Urgency- How often have you found it difficult to postpone urination? 4   Weak Stream- How often have you had a weak urinary stream? 2   Straining- How often have you had to strain to start urination? 1   Nocturia- How many times did you typically get up at night to urinate? 3   Score: 20       QUALITY OF LIFE DUE TO URINARY SYMPTOMS:     If you were to spend the rest of your life with your urinary condition just the way it is now, how would you feel about that? 3 = Mixed    SEXUAL HEALTH INVENTORY FOR MEN (RAJ):       2/20/2025    12:43 PM   RAJ Total   How do you rate your confidence that you could get and keep an erection? 1   When you had erections with sexual stimulation, how often were your erections hard enough for penetration (entering your partner)? 0   During sexual intercourse, how often were you able to maintain you erection after you had penetrated (entered) your partner? 2   During Sexual intercourse, how difficult was it to maintain your erection to completion of intercourse? 1   When you attampted sexual intercourse, how often was it satisfactory for you? 1   Total: 5       LABORATORY DATA:           Lab Results   Component Value Date/Time    PSATOTAL 1.68 01/03/2020 10:09 AM    PSATOTAL 1.75 10/23/2018 10:53 AM         IMPRESSION:    A 64 y.o. with   Visit Diagnoses     ICD-10-CM   1. Prostate cancer (HCC)  C61     Prostate cancer (HCC)  Staging form: Prostate, AJCC 8th Edition  - Clinical stage from 2/10/2025: cT1c, cN0, cM0, PSA: 4.8 - Signed by Brian Musa M.D. on 2/10/2025  Prostate specific antigen (PSA) range: Less than 10        RECOMMENDATIONS:   I reviewed with him the natural history and risk factors for prostate cancer, his diagnosis of very low risk disease with quite favorable findings with a low PSA and Abimael 6 disease and no concerning findings on imaging.  I discussed that the standard  recommendation would be for active surveillance.  We discussed what this would entail with surveillance PSAs, and possibly additional imaging and biopsy has indicated.    He has a very hard time with sleep right now and feels like he is perseverating on his diagnosis and this is actually running his quality of life.  His urinary function is also not very good.  We discussed Flomax and he is willing to try this and I will prescribe that today.    Given the anxiety that the diagnosis appears to be giving him, the other option would be to consider decipher, and if the decipher score is low, this would be additional reassurance.  If he is decipher high, then it would perhaps justify considering treatment now or at least more closer surveillance.  He is in agreement with this as well.  We will send off a decipher today.  Follow-up with me in about 2 to 3 weeks.    Thank you for the opportunity to participate in his care.  If any questions or comments, please do not hesitate in calling.

## 2025-02-20 NOTE — PROGRESS NOTES
Patient was seen today in clinic with Dr. Morrissey for consult.  Vitals signs and weight were obtained and pain assessment was completed.  Allergies and medications were reviewed with the patient.       Vitals/Pain:  Vitals:    02/20/25 1240   BP: 107/59   BP Location: Right arm   Patient Position: Sitting   BP Cuff Size: Adult long   Pulse: 63   Resp: 18   Temp: 36.9 °C (98.4 °F)   TempSrc: Temporal   SpO2: 99%   Weight: 86.7 kg (191 lb 2.2 oz)   Pain Score: 5=Moderate Pain        Allergies:   Patient has no known allergies.    Current Medications:  Current Outpatient Medications   Medication Sig Dispense Refill    celecoxib (CELEBREX) 100 MG Cap Take 100 mg by mouth as needed.      oxyCODONE-acetaminophen (PERCOCET) 5-325 MG Tab Take 1 Tablet by mouth 4 times a day as needed. for pain      traZODone (DESYREL) 50 MG Tab Take 1 Tablet by mouth every evening. 30 Tablet 0    ibuprofen (MOTRIN) 800 MG Tab       meloxicam (MOBIC) 15 MG tablet Take 1 Tablet by mouth every day. (Patient not taking: Reported on 2/20/2025) 30 Tablet 0    meloxicam (MOBIC) 15 MG tablet TAKE 1 TABLET BY MOUTH EVERY DAY 30 Tablet 0    anastrozole (ARIMIDEX) 1 MG Tab TAKE 1 TABLET BY MOUTH 3 TIMES A WEEK       No current facility-administered medications for this encounter.         PCP:  Pcp Pt States None        Itzel Her R.N.

## 2025-03-05 ENCOUNTER — PATIENT OUTREACH (OUTPATIENT)
Dept: ONCOLOGY | Facility: MEDICAL CENTER | Age: 65
End: 2025-03-05
Payer: COMMERCIAL

## 2025-03-05 NOTE — PROGRESS NOTES
Incoming call from Samir. Checking to see if there were any lab orders for him to complete. ONN noted PSA lab order in chart. ONN verified with provider if any other testing was needed at this time. ONN called Samir back and relayed information that a PSA order was in the chart for him to have drawn. Samir will go to a [facility] lab to have this drawn. Dr. Morrissey to call patient to discuss Decipher results on 3/13 scheduled call.

## 2025-03-08 ENCOUNTER — HOSPITAL ENCOUNTER (OUTPATIENT)
Facility: MEDICAL CENTER | Age: 65
End: 2025-03-08
Attending: RADIOLOGY
Payer: COMMERCIAL

## 2025-03-08 DIAGNOSIS — C61 PROSTATE CANCER (HCC): ICD-10-CM

## 2025-03-08 LAB — PSA SERPL-MCNC: 2.93 NG/ML (ref 0–4)

## 2025-03-08 PROCEDURE — 36415 COLL VENOUS BLD VENIPUNCTURE: CPT

## 2025-03-08 PROCEDURE — 84153 ASSAY OF PSA TOTAL: CPT

## 2025-03-10 ENCOUNTER — HOSPITAL ENCOUNTER (OUTPATIENT)
Dept: RADIATION ONCOLOGY | Facility: MEDICAL CENTER | Age: 65
End: 2025-03-10
Attending: RADIOLOGY
Payer: COMMERCIAL

## 2025-03-10 DIAGNOSIS — C61 PROSTATE CANCER (HCC): ICD-10-CM

## 2025-03-10 PROCEDURE — 99213 OFFICE O/P EST LOW 20 MIN: CPT | Mod: 93 | Performed by: RADIOLOGY

## 2025-03-10 NOTE — PROGRESS NOTES
This visit is being conducted by telephone. This telephone visit was initiated by the patient and they verbally consented.  Patient at home in [facility].      Reason for Call:  Testing follow up    Treatment History:     Radiation Oncology           No data to display                 HPI:    Subjective    This is a 64-year-old gentleman who comes today to discuss options for his prostate cancer.  He was noted to have a mildly elevated PSA of 4.8 in August of last year.  This led to workup with an MRI that was read as PI-RADS 1, but ultimately he had a prostate biopsy on January 3 that confirmed Abimael 6 adenocarcinoma in 5 out of 10 cores.     He has seen multiple physicians since then.  He saw urology, then saw radiation oncology at [facility], and then saw Dr. Musa in medical oncology at [facility].  He seems to have some difficulty with living with his diagnosis, it is interfering with his sleep, and he is constantly thinking about it.  He has been counseled for active surveillance and he comes today to discuss options.     Urinary function is relatively poor, IPSS score of 20, not on any urinary medications, has never been prescribed alpha blockers before.           Interval History:   3/10/2025 we are having a telephone follow-up to review the results of his testing.  Repeat PSA came back at 2.9.  Decipher testing was low, 0.23.     Labs / Images Reviewed:   No results found.    Assessment:   Prostate cancer (HCC)  Staging form: Prostate, AJCC 8th Edition  - Clinical stage from 2/10/2025: cT1c, cN0, cM0, PSA: 4.8 - Signed by Brian Musa M.D. on 2/10/2025  Prostate specific antigen (PSA) range: Less than 10      Cancer Status:   On AS    Plan:   I relayed the overall favorable results with his decrease in PSA, as well as his low risk decipher score.  Overall, I recommend active surveillance.  He is in agreement.  PSA and follow-up in 6 months.    Time Spent on Call: 5 min      Time Spent in Preparation:  10 min    Total Time Spent: 15 min    Claudio Morrissey M.D.

## 2025-05-11 ENCOUNTER — HOSPITAL ENCOUNTER (OUTPATIENT)
Dept: RADIOLOGY | Facility: MEDICAL CENTER | Age: 65
End: 2025-05-11
Payer: COMMERCIAL

## 2025-05-11 ENCOUNTER — OFFICE VISIT (OUTPATIENT)
Dept: URGENT CARE | Facility: PHYSICIAN GROUP | Age: 65
End: 2025-05-11
Payer: COMMERCIAL

## 2025-05-11 ENCOUNTER — APPOINTMENT (OUTPATIENT)
Dept: URGENT CARE | Facility: PHYSICIAN GROUP | Age: 65
End: 2025-05-11
Payer: COMMERCIAL

## 2025-05-11 VITALS
HEART RATE: 59 BPM | SYSTOLIC BLOOD PRESSURE: 136 MMHG | TEMPERATURE: 97.7 F | BODY MASS INDEX: 24.25 KG/M2 | HEIGHT: 74 IN | RESPIRATION RATE: 14 BRPM | DIASTOLIC BLOOD PRESSURE: 84 MMHG | WEIGHT: 188.93 LBS | OXYGEN SATURATION: 99 %

## 2025-05-11 DIAGNOSIS — Z87.39 HISTORY OF HERNIATED INTERVERTEBRAL DISC: ICD-10-CM

## 2025-05-11 DIAGNOSIS — M54.2 NECK PAIN ON LEFT SIDE: ICD-10-CM

## 2025-05-11 DIAGNOSIS — M54.12 CERVICAL RADICULOPATHY AT C7: ICD-10-CM

## 2025-05-11 DIAGNOSIS — M43.10 RETROLISTHESIS OF VERTEBRAE: ICD-10-CM

## 2025-05-11 PROCEDURE — 3079F DIAST BP 80-89 MM HG: CPT

## 2025-05-11 PROCEDURE — 99214 OFFICE O/P EST MOD 30 MIN: CPT

## 2025-05-11 PROCEDURE — 3075F SYST BP GE 130 - 139MM HG: CPT

## 2025-05-11 PROCEDURE — 72040 X-RAY EXAM NECK SPINE 2-3 VW: CPT

## 2025-05-11 RX ORDER — CYCLOBENZAPRINE HCL 10 MG
10 TABLET ORAL 3 TIMES DAILY PRN
Qty: 30 TABLET | Refills: 0 | Status: CANCELLED | OUTPATIENT
Start: 2025-05-11

## 2025-05-11 RX ORDER — CYCLOBENZAPRINE HCL 10 MG
10 TABLET ORAL 3 TIMES DAILY PRN
Qty: 30 TABLET | Refills: 0 | Status: SHIPPED | OUTPATIENT
Start: 2025-05-11

## 2025-05-11 ASSESSMENT — ENCOUNTER SYMPTOMS: NECK PAIN: 1

## 2025-05-11 NOTE — PROGRESS NOTES
Subjective:   Samir Godinez is a 65 y.o. male who presents for Neck Pain (Xfew weeks. L side neck pain since falling)    Patient presents to the clinic for complaints of left neck pain x 3 weeks.  Patient tripped weeks ago and landed on the ground on his left shoulder/side, causing left neck pain. Denies head strike or direct neck trauma.   Has had pain and tenderness to the left neck since incident. Has limited ROM with lateral rotation to the left - limited by the pain as well as the mechanical movement of the neck/cervical spine.   Has numbness and tingling that is mild to the left index and middle finger, but can still feel.   Occasional shooting pain down the left arm from the left neck when he turns his neck to the left side.  Has taken Percocet and celebrex for the pain.  Has hx of herniated disc in the cervical spine.       Neck Pain         Review of Systems   Musculoskeletal:  Positive for neck pain.     Refer to HPI for additional details.    During this visit, appropriate PPE was worn, and hand hygiene was performed.    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    He has no past medical history of Cough, Gasping for breath, Morning headache, Painful breathing, Sputum production, or Wheezing.    MEDS:   Current Outpatient Medications:     cyclobenzaprine (FLEXERIL) 10 MG Tab, Take 1 Tablet by mouth 3 times a day as needed for Moderate Pain., Disp: 30 Tablet, Rfl: 0    celecoxib (CELEBREX) 100 MG Cap, Take 100 mg by mouth as needed., Disp: , Rfl:     tamsulosin (FLOMAX) 0.4 MG capsule, Take 1 Capsule by mouth 1/2 hour after dinner., Disp: 90 Capsule, Rfl: 2    oxyCODONE-acetaminophen (PERCOCET) 5-325 MG Tab, Take 1 Tablet by mouth 4 times a day as needed. for pain, Disp: , Rfl:     traZODone (DESYREL) 50 MG Tab, Take 1 Tablet by mouth every  "evening., Disp: 30 Tablet, Rfl: 0    ibuprofen (MOTRIN) 800 MG Tab, , Disp: , Rfl:     meloxicam (MOBIC) 15 MG tablet, Take 1 Tablet by mouth every day. (Patient not taking: Reported on 2/20/2025), Disp: 30 Tablet, Rfl: 0    meloxicam (MOBIC) 15 MG tablet, TAKE 1 TABLET BY MOUTH EVERY DAY, Disp: 30 Tablet, Rfl: 0    anastrozole (ARIMIDEX) 1 MG Tab, TAKE 1 TABLET BY MOUTH 3 TIMES A WEEK, Disp: , Rfl:     ALLERGIES: No Known Allergies  SURGHX:   Past Surgical History:   Procedure Laterality Date    HIP REVISION TOTAL Right 10/19/2020    Procedure: REVISION, TOTAL ARTHROPLASTY, HIP;  Surgeon: Emigdio Sales M.D.;  Location: SURGERY [facility];  Service: Orthopedics    CARPAL TUNNEL RELEASE Left 07/2020    PB RECONSTR TOTAL SHOULDER IMPLANT Right 11/27/2019    Procedure: ARTHROPLASTY, SHOULDER, TOTAL;  Surgeon: Denzel Veliz M.D.;  Location: SURGERY [facility];  Service: Orthopedics    [place] NEUROPLASTY & OR TRANSPOS MEDIAN NRV CARPAL RADHA Right 11/27/2019    Procedure: RELEASE, CARPAL TUNNEL;  Surgeon: Denzel Veliz M.D.;  Location: SURGERY [facility];  Service: Orthopedics    PB RECONSTR TOTAL SHOULDER IMPLANT Left 9/25/2019    Procedure: ARTHROPLASTY, SHOULDER, TOTAL- ANATOMIC;  Surgeon: Denzel Veliz M.D.;  Location: SURGERY [facility];  Service: Orthopedics    HIP REPLACEMENT, REVISION Right 2014    HIP REPLACEMENT, TOTAL Right 2008    HIP ARTHROSCOPY Right 2007    KNEE ARTHROSCOPY Left 2000     SOCHX:  reports that he has never smoked. He has never been exposed to tobacco smoke. He has never used smokeless tobacco. He reports that he does not currently use alcohol. He reports that he does not use drugs.    FH: Per HPI as applicable/pertinent.    Medications, Allergies, and current problem list reviewed today in Epic.     Objective:     /84   Pulse (!) 59   Temp 36.5 °C (97.7 °F)   Resp 14   Ht 1.88 m (6' 2\")   Wt 85.7 kg (188 lb 15 oz)   SpO2 99%     Physical " Exam  Constitutional:       Appearance: Normal appearance.   HENT:      Right Ear: External ear normal.      Left Ear: External ear normal.   Eyes:      Extraocular Movements: Extraocular movements intact.      Conjunctiva/sclera: Conjunctivae normal.   Neck:        Comments: No palpable deformity or malalignment of the cervical spine.  Cardiovascular:      Rate and Rhythm: Normal rate and regular rhythm.      Pulses: Normal pulses.   Pulmonary:      Effort: Pulmonary effort is normal.   Musculoskeletal:         General: Tenderness (neck) present. No swelling or deformity.      Cervical back: Rigidity and tenderness present. No edema, erythema or crepitus. Pain with movement and spinous process tenderness (to the 2 highlighted areas in the image above) present. No muscular tenderness. Decreased range of motion (Limited left rotation, left lateral flexion, and posterior extension).   Lymphadenopathy:      Cervical: No cervical adenopathy.   Skin:     General: Skin is warm and dry.      Capillary Refill: Capillary refill takes less than 2 seconds.      Findings: No bruising or erythema.   Neurological:      General: No focal deficit present.      Mental Status: He is alert and oriented to person, place, and time.      Gait: Gait normal.   Psychiatric:         Mood and Affect: Mood normal.         Behavior: Behavior normal.         Thought Content: Thought content normal.         Judgment: Judgment normal.       DX-CERVICAL SPINE-2 OR 3 VIEWS  Result Date: 5/11/2025 5/11/2025 3:27 PM HISTORY/REASON FOR EXAM:  Pain in cervical spine after C-spine injury. TECHNIQUE/EXAM DESCRIPTION AND NUMBER OF VIEWS: Cervical spine series, 3 views. COMPARISON:  None. FINDINGS: There is no evidence of acute fracture in the cervical spine. No focal compression fractures are noted. There is retrolisthesis at C5-C6. There is evidence of moderate degenerative disc disease and facet arthropathy. No soft tissue abnormality is identified.      1.  No compression deformity or acute fracture. 2.  Retrolisthesis at C5-C6. 3.  Degenerative disc disease and facet arthropathy.    Assessment/Plan:     Diagnosis and associated orders:     1. Neck pain on left side  - DX-CERVICAL SPINE-2 OR 3 VIEWS; Future  - Referral to Orthopedics  - cyclobenzaprine (FLEXERIL) 10 MG Tab; Take 1 Tablet by mouth 3 times a day as needed for Moderate Pain.  Dispense: 30 Tablet; Refill: 0    2. History of herniated intervertebral disc  - DX-CERVICAL SPINE-2 OR 3 VIEWS; Future  - Referral to Orthopedics    3. Cervical radiculopathy at C7  - DX-CERVICAL SPINE-2 OR 3 VIEWS; Future  - Referral to Orthopedics    4. Retrolisthesis of vertebrae  - DX-CERVICAL SPINE-2 OR 3 VIEWS; Future  - Referral to Orthopedics     Comments/MDM:     Reviewed x-ray results with patient, which shows degenerative changes in the cervical spine as well as a retrolisthesis at C5-C6. Imaging results and images personally reviewed by this provider as well.  Referral to orthopedics placed for the patient.  Flexeril as needed prescribed to the patient. Discussed proper administration and dosing as well as associated adverse/side effects of prescribed medications.  Instructed patient to avoid taking Flexeril with other sedating medications, such as opioids.  Advised patient to continue OTC pharmacologic and nonpharmacologic treatment of her symptoms, including but not limited to Tylenol, Motrin, heat therapy, ice therapy, rest, limitation of movement and strain, and plenty of rest and fluids.  Patient agreeable to this plan of care.  All questions answered.  Return to clinic if symptoms persist or worsen.  Red flag symptoms warranting emergency medical services discussed, including but not limited to chest pain, SOB, wheezing, difficulty breathing or swallowing, sensation of throat closing or mass in the throat, severe intractable headache, changes to vision or hearing, palpitations or racing heart rate,  abdominal pain, fever greater than 103F despite medication management, worsening or more persistent numbness and tingling, worsening shooting pain, weakness, severe pain, dizziness/lightheadedness/vertigo, or nausea/vomiting/diarrhea.           Differential diagnosis, natural history, supportive care, and indications for immediate follow-up discussed.    Advised the patient to follow-up with the primary care physician for recheck, reevaluation, and consideration of further management.    Instructed patient to seek emergency medical attention via calling EMS or going to the Emergency Room for red flag symptoms, including but not limited to: chest pain, palpitations, fever greater than 103F, shortness of breath, wheezing, new or worsened numbness/tingling, focal or unilateral weakness, and bloody vomit/stool.     Please note that this dictation was created using voice recognition software. I have made a reasonable attempt to correct obvious errors, but I expect that there are errors of grammar and possibly content that I did not discover before finalizing the note.    This note was electronically signed by TYLOR Arboleda

## 2025-05-25 ENCOUNTER — HOSPITAL ENCOUNTER (OUTPATIENT)
Dept: RADIOLOGY | Facility: MEDICAL CENTER | Age: 65
End: 2025-05-25
Attending: ORTHOPAEDIC SURGERY
Payer: COMMERCIAL

## 2025-05-25 DIAGNOSIS — M54.2 CERVICALGIA: ICD-10-CM

## 2025-05-25 PROCEDURE — 72050 X-RAY EXAM NECK SPINE 4/5VWS: CPT

## 2025-05-29 ENCOUNTER — HOSPITAL ENCOUNTER (EMERGENCY)
Facility: MEDICAL CENTER | Age: 65
End: 2025-05-29
Attending: EMERGENCY MEDICINE
Payer: OTHER MISCELLANEOUS

## 2025-05-29 VITALS
OXYGEN SATURATION: 100 % | BODY MASS INDEX: 24.02 KG/M2 | SYSTOLIC BLOOD PRESSURE: 131 MMHG | TEMPERATURE: 99.2 F | HEIGHT: 74 IN | HEART RATE: 61 BPM | WEIGHT: 187.17 LBS | RESPIRATION RATE: 16 BRPM | DIASTOLIC BLOOD PRESSURE: 78 MMHG

## 2025-05-29 DIAGNOSIS — H53.9 VISUAL DISTURBANCE OF ONE EYE: ICD-10-CM

## 2025-05-29 DIAGNOSIS — Z77.098 CHEMICAL EXPOSURE OF EYE: Primary | ICD-10-CM

## 2025-05-29 PROCEDURE — 700101 HCHG RX REV CODE 250: Performed by: EMERGENCY MEDICINE

## 2025-05-29 PROCEDURE — 99283 EMERGENCY DEPT VISIT LOW MDM: CPT

## 2025-05-29 RX ORDER — ERYTHROMYCIN 5 MG/G
1 OINTMENT OPHTHALMIC ONCE
Status: COMPLETED | OUTPATIENT
Start: 2025-05-29 | End: 2025-05-29

## 2025-05-29 RX ORDER — PROPARACAINE HYDROCHLORIDE 5 MG/ML
1 SOLUTION/ DROPS OPHTHALMIC ONCE
Status: COMPLETED | OUTPATIENT
Start: 2025-05-29 | End: 2025-05-29

## 2025-05-29 RX ADMIN — ERYTHROMYCIN 1 APPLICATION: 5 OINTMENT OPHTHALMIC at 16:30

## 2025-05-29 RX ADMIN — FLUORESCEIN SODIUM 1 MG: 1 STRIP OPHTHALMIC at 16:00

## 2025-05-29 RX ADMIN — PROPARACAINE HYDROCHLORIDE 1 DROP: 5 SOLUTION/ DROPS OPHTHALMIC at 16:00

## 2025-05-29 NOTE — ED NOTES
Visual acuity test administered with results of left eye 20/100, right eye 20/30, and both eyes 20/30.

## 2025-05-29 NOTE — ED TRIAGE NOTES
"Chief Complaint   Patient presents with    Chemical Exposure     Liquid chlorine to his OS 1wk ago. Reports rinsed out with H2O at that time.   Reports no initial issues beyond local irritation. 2d ago vision is cloudy from his OS.      Blood Pressure : (!) 143/74, Pulse: 70, Respiration: 16, Temperature: 37.3 °C (99.2 °F), Height: 188 cm (6' 2\"), Weight: 84.9 kg (187 lb 2.7 oz), BMI (Calculated): 24.03, BSA (Calculated): 2.1, Pulse Oximetry: 100 %, O2 Delivery Device: None - Room Air  "

## 2025-05-29 NOTE — LETTER
"  EMPLOYEE’S CLAIM FOR COMPENSATION/ REPORT OF INITIAL TREATMENT  FORM C-4  EMPLOYEE’S CLAIM - PROVIDE ALL INFORMATION REQUESTED   First Name                    JOVANNY Dawson                  Last Name  Gretchen Birthdate                    1960                Sex  [x]Male Claim Number (Insurer’s Use Only)     Mailing Address  [address]  Age  65 y.o. Height  1.88 m (6' 2\") Weight  84.9 kg (187 lb 2.7 oz) Social Security Number     [facility] [place]  [address] Telephone  [phone] (home)    Email  [email or web address]    Primary Language Spoken  English    INSURER   THIRD-PARTY   [facility] [employer]    Employee's Occupation (Job Title) When Injury or Occupational Disease Occurred Noa    Employer's Name/Company Name    [employer]      Office Mail Address (Number and Street)    [address] [place] [address]   Date of Injury (if applicable) 5/22/2025               Hours Injury (if applicable)  8:00 AM am    pm Date Employer Notified  5/22/2025 Last Day of Work after Injury or Occupational Disease  5/29/2025 Supervisor to Whom Injury Reported  Tiago Gomes   Address or Location of Accident (if applicable)  Work [1]   What were you doing at the time of accident? (if applicable)  Taking a water sample    How did this injury or occupational disease occur? (Be specific and answer in detail. Use additional sheet if necessary)  I was getting ready to take a water sample and was spraying the  faucet down with 12.5% liquid chlorine and it splashed me in the left  eye. I rinsed it out with water   If you believe that you have an occupational disease, when did you first have knowledge of the disability and its relationship to your employment?  n/a Witnesses to the Accident (if applicable)  Jesse Yi, Dex brito, Ed with TPU      Nature of Injury or Occupational " Disease  Vision Loss  Part(s) of Body Injured or Affected  Eye (L) N/A N/A    I CERTIFY THAT THE ABOVE IS TRUE AND CORRECT TO T HE BEST OF MY KNOWLEDGE AND THAT I HAVE PROVIDED THIS INFORMATION IN ORDER TO OBTAIN THE BENEFITS OF [place], OR CHAPTER 617 [place]).  I HEREBY AUTHORIZE ANY PHYSICIAN, CHIROPRACTOR, SURGEON, PRACTITIONER OR ANY OTHER PERSON, ANY HOSPITAL, INCLUDING [facility], ANY  MEDICAL SERVICE ORGANIZATION, ANY INSURANCE COMPANY, OR OTHER INSTITUTION OR ORGANIZATION TO RELEASE TO EACH OTHER, ANY MEDICAL OR OTHER INFORMATION, INCLUDING BENEFITS PAID OR PAYABLE, PERTINENT TO THIS INJURY OR DISEASE, EXCEPT INFORMATION RELATIVE TO DIAGNOSIS, TREATMENT AND/OR COUNSELING FOR AIDS, PSYCHOLOGICAL CONDITIONS, ALCOHOL OR CONTROLLED SUBSTANCES, FOR WHICH I MUST GIVE SPECIFIC AUTHORIZATION.  A PHOTOSTAT OF THIS AUTHORIZATION SHALL BE VALID AS THE ORIGINAL.     Date 05/29/2025   Place [facility] Employee’s Original or  *Electronic Signature   THIS REPORT MUST BE COMPLETED AND MAILED WITHIN 3 WORKING DAYS OF TREATMENT   Place  EMERGENCY DEPT    Name of Facility   [facility]   Date 5/29/2025 Diagnosis and Description of Injury or Occupational Disease  No diagnosis found.  There were no encounter diagnoses. Is there evidence that the injured employee was under the influence of alcohol and/or another controlled substance at the time of accident?  []No  [] Yes (if yes, please explain)   Hour       Treatment:      Have you advised the patient to remain off work five days or more?      [] Yes  If yes, indicate dates: From_ _                                                      To __ _  [] No   If no, is the injured employee capable of: [] full duty     [] modified duty      If modified duty, specify any limitations / restrictions:__________________  ___ ___________________________      X-Ray Findings:      From information given by the employee, together with medical evidence, can you directly connect this injury or occupational disease as job incurred?  []Yes   [] No      Is additional medical care by a physician indicated? []Yes [] No       Do you know of any previous injury or disease contributing to this condition or occupational disease? []Yes [] No (Explain if yes)                              Date  5/29/2025 Print Health Care Provider’s Name  No name on file I certify that the employer’s copy of  this form was delivered to the employer on:   Address   [address]  INSURER'S USE ONLY                       [facility] Zip   [address] Provider’s Tax ID Number  [id number]   Telephone  Dept: [phone]    Health Care Provider’s Original or Electronic Signature           Degree (MD,DO, DC,PABraedenC,APRN)  {Provider Degrees:54558}  Choose (if applicable)      ORIGINAL - TREATING HEALTHCARE PROVIDER PAGE 2 - INSURER/TPA PAGE 3 - EMPLOYER PAGE 4 - EMPLOYEE             Form C-4 (rev.02/25)

## 2025-05-29 NOTE — ED PROVIDER NOTES
ED Provider Note    CHIEF COMPLAINT  Chief Complaint   Patient presents with    Chemical Exposure     Liquid chlorine to his OS 1wk ago. Reports rinsed out with H2O at that time.   Reports no initial issues beyond local irritation. 2d ago vision is cloudy from his OS.        EXTERNAL RECORDS REVIEWED  Due to outpatient orthopedic encounters    HPI/ROS  LIMITATION TO HISTORY   None  OUTSIDE HISTORIAN(S):  None    Samir Godinez is a 65 y.o. male who presents for evaluation of ongoing irritation subjective blurriness of vision in the left eye.  The patient works in construction.  Exactly 1 week ago on Thursday the patient had liquid high concentration chlorine splashed into his left eye.  He immediately washed his eye out with over a liter of fluid.  He did not seek medical care.  Over the past 2 days he has had subjective discomfort to the left eye with some blurriness of vision.  He specifically denies purulent drainage or discharge.  He has no ocular surgical history he does not use contact lenses.  He denies double vision.  No fevers or chills.  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    SURGICAL HISTORY   has a past surgical history that includes hip replacement, total (Right, 2008); reconstr total shoulder implant (Left, 9/25/2019); reconstr total shoulder implant (Right, 11/27/2019); neuroplasty & or transpos median nrv carpal krysta (Right, 11/27/2019); hip arthroscopy (Right, 2007); hip replacement, revision (Right, 2014); knee arthroscopy (Left, 2000); carpal tunnel release (Left, 07/2020); and hip revision total (Right, 10/19/2020).    FAMILY HISTORY  History reviewed. No pertinent family history.    SOCIAL HISTORY  Social History     Tobacco Use    Smoking status: Never     Passive  "exposure: Never    Smokeless tobacco: Never   Vaping Use    Vaping status: Never Used   Substance and Sexual Activity    Alcohol use: Not Currently     Comment: Rarely    Drug use: No    Sexual activity: Not on file       CURRENT MEDICATIONS  Home Medications       Reviewed by Wilfrid Lowe R.N. (Registered Nurse) on 05/29/25 at 1347  Med List Status: Not Addressed     Medication Last Dose Status   celecoxib (CELEBREX) 100 MG Cap  Active   cyclobenzaprine (FLEXERIL) 10 MG Tab  Active   ibuprofen (MOTRIN) 800 MG Tab  Active   oxyCODONE-acetaminophen (PERCOCET) 5-325 MG Tab  Active   tamsulosin (FLOMAX) 0.4 MG capsule  Active   traZODone (DESYREL) 50 MG Tab  Active                    ALLERGIES  Allergies[1]    PHYSICAL EXAM  VITAL SIGNS: BP (!) 143/74   Pulse 70   Temp 37.3 °C (99.2 °F) (Temporal)   Resp 16   Ht 1.88 m (6' 2\")   Wt 84.9 kg (187 lb 2.7 oz)   SpO2 100%   BMI 24.03 kg/m²    Pulse ox interpretation: I interpret this pulse ox as normal.  Constitutional: Alert and oriented x 3, no acute distress  HEENT: Atraumatic normocephalic, pupils are 4 mm equal round reactive to light extraocular movements are intact scleral icterus no erythema no purulent drainage. The nares is clear, external ears are normal, mouth shows moist mucous membranes normal dentition for age  Neck: Supple, no JVD no tracheal deviation  Cardiovascular: Regular rate and rhythm no murmur rub or gallop 2+ pulses peripherally x4  Thorax & Lungs: No respiratory distress, no wheezes rales or rhonchi, No chest tenderness.   GI: Soft nontender nondistended positive bowel sounds, no peritoneal signs  Skin: Warm dry no acute rash or lesion  Musculoskeletal: Moving all extremities with full range and 5 of 5 strength no acute  deformity  Neurologic: Cranial nerves III through XII are grossly intact no sensory deficit no cerebellar dysfunction   Psychiatric: Appropriate affect for situation at this " time          EKG/LABS    RADIOLOGY/PROCEDURES     Physician procedure ocular tonometry.  Left eye had 3 measurements 21,23 and 23 right eye was 15,16 and 14    Fluorescein and slit lamp exam: Proparacaine was instilled into both eyes.  Fluorescein dye was directed to the affected left eye.  Slit lamp exam was performed.  There is no cell or flare.  No dendritic lesions negative Rhea sign.  There did not appear to be any denudation of the cornea or opacification.  No foreign body.      COURSE & MEDICAL DECISION MAKING    ASSESSMENT, COURSE AND PLAN  Care Narrative:     This is a very pleasant 65-year-old gentleman who presents here with occupational chemical exposure to the left eye.  I was concerned about possible significant corneal burn and/or abrasion infection not exclusively.  On arrival his vital signs are reassuring and normal.  His visual acuity is diminished on the left at 20/100 with 20/30 on the unaffected right side and 20/30 both eyes uncorrected.  His eye pressure was slightly high in the left eye but not above 25 and there was no clear evidence of corneal burn abrasion or infection.  Slit-lamp exam was performed there is no foreign body or cell or flare.  The patient does have some loss of visual acuity which is unclear as to whether or not this is more chronic or acute.  I could not see any obvious corneal cloudiness burn or evidence of infection.  Out of an abundance of caution we will start him on erythromycin and emergently refer him to Dr. Segovia with ophthalmology for definitive exam          ADDITIONAL PROBLEMS MANAGED      DISPOSITION AND DISCUSSIONS  I have discussed management of the patient with the following physicians and SAIMA's: None    Discussion of management with other [facility] or appropriate source(s): None    Escalation of care considered, and ultimately not performed: None    Barriers to care at this time, including but not limited to: None.     Decision tools and prescription drugs  considered including, but not limited to: Patient will be provided erythromycin ophthalmic ointment.    FINAL DIAGNOSIS  1. Chemical exposure of eye  Referral to Ophthalmology      2. Visual disturbance of one eye  Referral to Ophthalmology             Electronically signed by: Neeraj Yanez M.D., 5/29/2025 3:38 PM           [1] No Known Allergies

## 2025-05-29 NOTE — LETTER
"  EMPLOYEE’S CLAIM FOR COMPENSATION/ REPORT OF INITIAL TREATMENT  FORM C-4  EMPLOYEE’S CLAIM - PROVIDE ALL INFORMATION REQUESTED   First Name                    JOVANNY Dawson                  Last Name  Gretchen Birthdate                    1960                Sex  [x]Male Claim Number (Insurer’s Use Only)     Mailing Address  [address]  Age  65 y.o. Height  1.88 m (6' 2\") Weight  84.9 kg Social Security Number     [facility]  [address] Telephone  [phone] (home)    Email  [email or web address]    Primary Language Spoken  English    INSURER   THIRD-PARTY   [facility] [employer]   Employee's Occupation (Job Title) When Injury or Occupational Disease Occurred  Noa    Employer's Name/Company Name [employer] Tele[employer] [phone]   Office Mail Address (Number and Street) [address] [place] [address]   Date of Injury (if applicable)5/22/2025               Hours Injury (if applicable)  8:00 AM am    pm Date Employer Notified  5/22/2025 Last Day of Work after Injury or Occupational Disease 5/29/2025 Supervisor to Whom Injury Reported  Tiago Gomes   Address or Location of Accident (if applicable) Work [1]   What were you doing at the time of accident? (if applicable) Taking a water sample    How did this injury or occupational disease occur? (Be specific and answer in detail. Use additional sheet if necessary)  I was getting ready to take a water sample and was spraying the faucet down with 12.5% liquid chlorine and it splashed me in the left eye. I rinsed it out with water   If you believe that you have an occupational disease, when did you first have knowledge of the disability and its relationship to your employment? N/A Witnesses to the Accident (if applicable) Jesse Yi, Dex brito, Ed with TPU      Nature of Injury or Occupational Disease Vision Loss " Part(s) of Body Injured or Affected Eye (L) N/A N/A    I CERTIFY THAT THE ABOVE IS TRUE AND CORRECT TO T HE BEST OF MY KNOWLEDGE AND THAT I HAVE PROVIDED THIS INFORMATION IN ORDER TO OBTAIN THE BENEFITS OF [place], OR [place] 617 [place]).  I HEREBY AUTHORIZE ANY PHYSICIAN, CHIROPRACTOR, SURGEON, PRACTITIONER OR ANY OTHER PERSON, ANY HOSPITAL, INCLUDING [facility], ANY  MEDICAL SERVICE ORGANIZATION, ANY INSURANCE COMPANY, OR OTHER INSTITUTION OR ORGANIZATION TO RELEASE TO EACH OTHER, ANY MEDICAL OR OTHER INFORMATION, INCLUDING BENEFITS PAID OR PAYABLE, PERTINENT TO THIS INJURY OR DISEASE, EXCEPT INFORMATION RELATIVE TO DIAGNOSIS, TREATMENT AND/OR COUNSELING FOR AIDS, PSYCHOLOGICAL CONDITIONS, ALCOHOL OR CONTROLLED SUBSTANCES, FOR WHICH I MUST GIVE SPECIFIC AUTHORIZATION.  A PHOTOSTAT OF THIS AUTHORIZATION SHALL BE VALID AS THE ORIGINAL.   Date 05/29/2025 Place [facility] Employee’s Signature     THIS REPORT MUST BE COMPLETED AND MAILED WITHIN 3 WORKING DAYS OF TREATMENT   Place  [facility] Name of Facility   EMERGENCY DEPT    Date 5/29/2025 Diagnosis and Description of Injury or Occupational Disease  (Z77.098) Chemical exposure of eye  (primary encounter diagnosis)  (H53.9) Visual disturbance of one eye  The primary encounter diagnosis was Chemical exposure of eye. A diagnosis of Visual disturbance of one eye was also pertinent to this visit. Is there evidence that the injured employee was under the influence of alcohol and/or another controlled substance at the time of accident?  [x]No  [] Yes (if yes, please explain)   Hour 16:28  No   Treatment: Physician evaluation and treatment of occupational chemical exposure to the left eye    Have you advised the patient to remain off work five days or more?   No  [] Yes  If yes, indicate dates: From_ _                                                       To __ _  [x] No   If no, is the injured employee capable of: [x] full duty Yes   [] modified duty      If modified duty, specify any limitations / restrictions:__________________  ___ ___________________________     X-Ray Findings: Negative    From information given by the employee, together with medical evidence, can you directly connect this injury or occupational disease as job incurred?  [x]Yes   [] No Yes    Is additional medical care by a physician indicated? [x]Yes [] No  Yes  Comments:Referral to ophthalmology    Do you know of any previous injury or disease contributing to this condition or occupational disease? []Yes [x] No (Explain if yes)                          No   Date  5/29/2025 Print Health Care Provider’s Name  No name on file I certify that the employer’s copy of  this form was delivered to the employer on:   Address   [address] [facility] [address] INSURER'S USE ONLY                       [facility] Zip   [address] Provider’s Tax ID Number [id number] Telephone  Dept: [phone]    Health Care Provider’s Original or Electronic Signature      [email or web address]-[email or web address] Coyne M.D.    Degree (MD,DO, DC,PA-C,APRN)  MD  Choose (if applicable)      ORIGINAL - TREATING HEALTHCARE PROVIDER PAGE 2 - INSURER/TPA PAGE 3 - EMPLOYER PAGE 4 - EMPLOYEE             Form C-4 (rev.02/25)

## 2025-08-04 ENCOUNTER — HOSPITAL ENCOUNTER (OUTPATIENT)
Dept: RADIOLOGY | Facility: MEDICAL CENTER | Age: 65
End: 2025-08-04
Attending: ORTHOPAEDIC SURGERY
Payer: COMMERCIAL

## 2025-08-04 DIAGNOSIS — M54.2 CERVICALGIA: ICD-10-CM

## 2025-08-04 PROCEDURE — 72141 MRI NECK SPINE W/O DYE: CPT

## (undated) DEVICE — DRAPE SURGICAL U 77X120 - (10/CA)

## (undated) DEVICE — GLOVE BIOGEL PI ORTHO SZ 7 PF LF (40PR/BX)

## (undated) DEVICE — STOCKINETTE IMPERVIOUS 12X48 - STERILELF (10/CA)"

## (undated) DEVICE — LENS/HOOD FOR SPACESUIT - (32/PK) PEEL AWAY FACE

## (undated) DEVICE — GLOVE BIOGEL ECLIPSE  PF LATEX SIZE 6.5 (50PR/BX)

## (undated) DEVICE — SUTURE 2-0 VICRYL PLUS CT-1 - 8 X 18 INCH(12/BX)

## (undated) DEVICE — SUTURE 1 ETHIBOND CTX C/R 8-1 - (12/BX)

## (undated) DEVICE — GLOVE BIOGEL SZ 7.5 SURGICAL PF LTX - (50PR/BX 4BX/CA)

## (undated) DEVICE — SPLINT PLASTER 4 IN  X 15 IN - (50/BX 12BX/CA)

## (undated) DEVICE — PACK TOTAL HIP - (1/CA)

## (undated) DEVICE — BLADE SAGITTAL SAW DUAL CUT 75.0 X 25.0MM (1/EA)

## (undated) DEVICE — SUTURE 0 ETHIBOND CT-1 - (12/BX) 18 INCH

## (undated) DEVICE — GLOVE BIOGEL PI ORTHO SZ 8 PF LF (40PR/BX)

## (undated) DEVICE — SET EXTENSION WITH 2 PORTS (48EA/CA) ***PART #2C8610 IS A SUBSTITUTE*****

## (undated) DEVICE — PADDING CAST 4 IN X 4 YDS - SOF-ROLL (12RL/BG 6BG/CT)

## (undated) DEVICE — GUIDE PIN

## (undated) DEVICE — SUTURE 5 TI-CRON HOS-14 - (36/BX)

## (undated) DEVICE — GOWN WARMING STANDARD FLEX - (30/CA)

## (undated) DEVICE — SEALER BIPOLAR AQUAMANTYS 6.0

## (undated) DEVICE — CANISTER SUCTION 3000ML MECHANICAL FILTER AUTO SHUTOFF MEDI-VAC NONSTERILE LF DISP  (40EA/CA)

## (undated) DEVICE — SODIUM CHL IRRIGATION 0.9% 1000ML (12EA/CA)

## (undated) DEVICE — SUCTION INSTRUMENT YANKAUER BULBOUS TIP W/O VENT (50EA/CA)

## (undated) DEVICE — TIP INTPLS HFLO ML ORFC BTRY - (12/CS)  FOR SURGILAV

## (undated) DEVICE — SET LEADWIRE 5 LEAD BEDSIDE DISPOSABLE ECG (1SET OF 5/EA)

## (undated) DEVICE — GLOVE BIOGEL M SZ 8 SURGICAL PF LTX - (50/BX 4BX/CA)

## (undated) DEVICE — BLADE OSCILLATING 9MMX24.6MMX0.4MM LIKE STRYKER 2296-3-111

## (undated) DEVICE — BLOCK

## (undated) DEVICE — SENSOR SPO2 NEO LNCS ADHESIVE (20/BX) SEE USER NOTES

## (undated) DEVICE — LACTATED RINGERS INJ 1000 ML - (14EA/CA 60CA/PF)

## (undated) DEVICE — STOCKINET BIAS 4 IN STERILE - (20/CA)

## (undated) DEVICE — ELECTRODE 850 FOAM ADHESIVE - HYDROGEL RADIOTRNSPRNT (50/PK)

## (undated) DEVICE — MASK ANESTHESIA ADULT  - (100/CA)

## (undated) DEVICE — DRAPE U ORTHOPEDIC - (10/BX)

## (undated) DEVICE — HEAD HOLDER JUNIOR/ADULT

## (undated) DEVICE — SPONGE GAUZESTER 4 X 4 4PLY - (128PK/CA)

## (undated) DEVICE — DRAPE U SPLIT IMP 54 X 76 - (24/CA)

## (undated) DEVICE — Device

## (undated) DEVICE — PROTECTOR ULNA NERVE - (36PR/CA)

## (undated) DEVICE — SUTURE GENERAL

## (undated) DEVICE — KIT ANESTHESIA W/CIRCUIT & 3/LT BAG W/FILTER (20EA/CA)

## (undated) DEVICE — NEPTUNE 4 PORT MANIFOLD - (20/PK)

## (undated) DEVICE — HANDPIECE 10FT INTPLS SCT PLS IRRIGATION HAND CONTROL SET (6/PK)

## (undated) DEVICE — CHLORAPREP 26 ML APPLICATOR - ORANGE TINT(25/CA)

## (undated) DEVICE — DRESSING PETROLEUM GAUZE 5 X 9" (50EA/BX 4BX/CA)"

## (undated) DEVICE — SUTURE 4-0 ETHILON FS-2 18 (36PK/BX)"

## (undated) DEVICE — DETERGENT RENUZYME PLUS 10 OZ PACKET (50/BX)

## (undated) DEVICE — ELECTRODE DUAL RETURN W/ CORD - (50/PK)

## (undated) DEVICE — GLOVE BIOGEL INDICATOR SZ 8 SURGICAL PF LTX - (50/BX 4BX/CA)

## (undated) DEVICE — GOWN SURGEONS X-LARGE - DISP. (30/CA)

## (undated) DEVICE — KIT EVACUATER 3 SPRING PVC LF 1/8 DRAIN SIZE (10EA/CA)"

## (undated) DEVICE — SLING ORTH UNV TIETX VLFM ARM

## (undated) DEVICE — DRESSING XEROFORM 1X8 - (50/BX 4BX/CA)

## (undated) DEVICE — SODIUM CHL. IRRIGATION 0.9% 3000ML (4EA/CA 65CA/PF)

## (undated) DEVICE — TUBE CONNECTING SUCTION - CLEAR PLASTIC STERILE 72 IN (50EA/CA)

## (undated) DEVICE — TUBING CLEARLINK DUO-VENT - C-FLO (48EA/CA)

## (undated) DEVICE — DRAPE STRLE REG TOWEL 18X24 - (10/BX 4BX/CA)"

## (undated) DEVICE — DRILL

## (undated) DEVICE — GLOVE BIOGEL PI INDICATOR SZ 6.5 SURGICAL PF LF - (50/BX 4BX/CA)

## (undated) DEVICE — SUTURE 2-0 MONOCRYL CT-1

## (undated) DEVICE — FOAM FACEHOLDER SPIDER (8EA/BX)

## (undated) DEVICE — GLOVE SZ 7.5 BIOGEL PI MICRO - PF LF (50PR/BX)

## (undated) DEVICE — BURR ROUND 66MM

## (undated) DEVICE — SUTURE 1 VICRYL PLUS CTX - 8 X 18 INCH (12/BX)

## (undated) DEVICE — MIXER BONE CEMENT REVOLUTION - W/FEMORAL PRESSURIZER (6/CA)

## (undated) DEVICE — CANISTER SUCTION RIGID RED 1500CC (40EA/CA)

## (undated) DEVICE — KIT ROOM DECONTAMINATION

## (undated) DEVICE — STAPLER SKIN DISP - (6/BX 10BX/CA) VISISTAT

## (undated) DEVICE — WATER IRRIGATION STERILE 1000ML (12EA/CA)

## (undated) DEVICE — SUTURE 3-0 MONOCRYL PLUS PS-1 - 27 INCH (36/BX)

## (undated) DEVICE — BRUSH FMCNL TIP IRR STRL - (12/PKG) FOR SURGILAV

## (undated) DEVICE — GLOVE, LITE (PAIR)

## (undated) DEVICE — PADDING CAST 4 IN STERILE - 4 X 4 YDS (24/CA)

## (undated) DEVICE — HUMID-VENT HEAT AND MOISTURE EXCHANGE- (50/BX)

## (undated) DEVICE — SUTURE 2-0 MONOCRYL PLUS UNDYED CT-1 1 X 36 (36EA/BX)"

## (undated) DEVICE — DRAPE LARGE 3 QUARTER - (20/CA)

## (undated) DEVICE — DRESSING AQUACEL AG ADVANTAGE 3.5 X 10" (10EA/BX)"

## (undated) DEVICE — GLOVE BIOGEL SZ 6.5 SURGICAL PF LTX (50PR/BX 4BX/CA)

## (undated) DEVICE — BAG SPONGE COUNT 10.25 X 32 - BLUE (250/CA)